# Patient Record
Sex: FEMALE | Race: WHITE | Employment: FULL TIME | URBAN - METROPOLITAN AREA
[De-identification: names, ages, dates, MRNs, and addresses within clinical notes are randomized per-mention and may not be internally consistent; named-entity substitution may affect disease eponyms.]

---

## 2017-03-13 ENCOUNTER — HOSPITAL ENCOUNTER (INPATIENT)
Facility: HOSPITAL | Age: 57
LOS: 3 days | Discharge: HOME | DRG: 189 | End: 2017-03-16
Attending: EMERGENCY MEDICINE | Admitting: HOSPITALIST

## 2017-03-13 ENCOUNTER — OFFICE VISIT (OUTPATIENT)
Dept: FAMILY MEDICINE CLINIC | Facility: CLINIC | Age: 57
End: 2017-03-13

## 2017-03-13 ENCOUNTER — APPOINTMENT (OUTPATIENT)
Dept: GENERAL RADIOLOGY | Facility: HOSPITAL | Age: 57
DRG: 189 | End: 2017-03-13
Attending: EMERGENCY MEDICINE

## 2017-03-13 VITALS — OXYGEN SATURATION: 80 % | HEART RATE: 106 BPM | RESPIRATION RATE: 26 BRPM

## 2017-03-13 DIAGNOSIS — Z02.9 ADMINISTRATIVE ENCOUNTER: Primary | ICD-10-CM

## 2017-03-13 DIAGNOSIS — J20.9 ACUTE BRONCHITIS, UNSPECIFIED ORGANISM: Primary | ICD-10-CM

## 2017-03-13 DIAGNOSIS — J44.1 COPD EXACERBATION (HCC): ICD-10-CM

## 2017-03-13 LAB
ANION GAP SERPL CALC-SCNC: 10 MMOL/L (ref 0–18)
BASOPHILS # BLD: 0 K/UL (ref 0–0.2)
BASOPHILS NFR BLD: 0 %
BNP SERPL-MCNC: 44 PG/ML (ref 0–100)
BUN SERPL-MCNC: 9 MG/DL (ref 8–20)
BUN/CREAT SERPL: 12 (ref 10–20)
CALCIUM SERPL-MCNC: 8.4 MG/DL (ref 8.5–10.5)
CHLORIDE SERPL-SCNC: 92 MMOL/L (ref 95–110)
CO2 SERPL-SCNC: 34 MMOL/L (ref 22–32)
CREAT SERPL-MCNC: 0.75 MG/DL (ref 0.5–1.5)
D DIMER PPP FEU-MCNC: 0.52 MCG/ML (ref ?–0.56)
EOSINOPHIL # BLD: 0 K/UL (ref 0–0.7)
EOSINOPHIL NFR BLD: 0 %
ERYTHROCYTE [DISTWIDTH] IN BLOOD BY AUTOMATED COUNT: 14.5 % (ref 11–15)
FLUAV + FLUBV RNA SPEC NAA+PROBE: NEGATIVE
FLUAV + FLUBV RNA SPEC NAA+PROBE: NEGATIVE
FLUAV + FLUBV RNA SPEC NAA+PROBE: POSITIVE
GLUCOSE SERPL-MCNC: 101 MG/DL (ref 70–99)
HCT VFR BLD AUTO: 38.7 % (ref 35–48)
HGB BLD-MCNC: 12.8 G/DL (ref 12–16)
LYMPHOCYTES # BLD: 0.7 K/UL (ref 1–4)
LYMPHOCYTES NFR BLD: 10 %
MCH RBC QN AUTO: 27.9 PG (ref 27–32)
MCHC RBC AUTO-ENTMCNC: 32.9 G/DL (ref 32–37)
MCV RBC AUTO: 84.8 FL (ref 80–100)
MONOCYTES # BLD: 0.9 K/UL (ref 0–1)
MONOCYTES NFR BLD: 13 %
NEUTROPHILS # BLD AUTO: 5.3 K/UL (ref 1.8–7.7)
NEUTROPHILS NFR BLD: 77 %
OSMOLALITY UR CALC.SUM OF ELEC: 281 MOSM/KG (ref 275–295)
PLATELET # BLD AUTO: 183 K/UL (ref 140–400)
PMV BLD AUTO: 10.2 FL (ref 7.4–10.3)
POTASSIUM SERPL-SCNC: 3.7 MMOL/L (ref 3.3–5.1)
RBC # BLD AUTO: 4.57 M/UL (ref 3.7–5.4)
SODIUM SERPL-SCNC: 136 MMOL/L (ref 136–144)
TROPONIN I SERPL-MCNC: 0.01 NG/ML (ref ?–0.03)
WBC # BLD AUTO: 6.9 K/UL (ref 4–11)

## 2017-03-13 PROCEDURE — 71020 XR CHEST PA + LAT CHEST (CPT=71020): CPT

## 2017-03-13 RX ORDER — KETOROLAC TROMETHAMINE 30 MG/ML
30 INJECTION, SOLUTION INTRAMUSCULAR; INTRAVENOUS ONCE
Status: COMPLETED | OUTPATIENT
Start: 2017-03-13 | End: 2017-03-13

## 2017-03-13 RX ORDER — IPRATROPIUM BROMIDE AND ALBUTEROL SULFATE 2.5; .5 MG/3ML; MG/3ML
3 SOLUTION RESPIRATORY (INHALATION) ONCE
Status: COMPLETED | OUTPATIENT
Start: 2017-03-13 | End: 2017-03-13

## 2017-03-13 RX ORDER — OMEPRAZOLE 20 MG/1
20 CAPSULE, DELAYED RELEASE ORAL EVERY MORNING
COMMUNITY
Start: 2017-03-14

## 2017-03-13 RX ORDER — ACETAMINOPHEN 325 MG/1
650 TABLET ORAL EVERY 6 HOURS PRN
Status: DISCONTINUED | OUTPATIENT
Start: 2017-03-13 | End: 2017-03-16

## 2017-03-13 RX ORDER — CETIRIZINE HYDROCHLORIDE 10 MG/1
10 TABLET ORAL DAILY
Status: DISCONTINUED | OUTPATIENT
Start: 2017-03-14 | End: 2017-03-16

## 2017-03-13 RX ORDER — PANTOPRAZOLE SODIUM 20 MG/1
20 TABLET, DELAYED RELEASE ORAL
Status: DISCONTINUED | OUTPATIENT
Start: 2017-03-14 | End: 2017-03-16

## 2017-03-13 RX ORDER — PREDNISONE 20 MG/1
TABLET ORAL
Qty: 12 TABLET | Refills: 0 | Status: SHIPPED | OUTPATIENT
Start: 2017-03-13 | End: 2017-03-16

## 2017-03-13 RX ORDER — FUROSEMIDE 40 MG/1
40 TABLET ORAL DAILY
COMMUNITY
Start: 2017-03-14

## 2017-03-13 RX ORDER — ENALAPRIL MALEATE 2.5 MG/1
2.5 TABLET ORAL 2 TIMES DAILY
Status: DISCONTINUED | OUTPATIENT
Start: 2017-03-13 | End: 2017-03-16

## 2017-03-13 RX ORDER — ALBUTEROL SULFATE 2.5 MG/3ML
2.5 SOLUTION RESPIRATORY (INHALATION) ONCE
Status: COMPLETED | OUTPATIENT
Start: 2017-03-13 | End: 2017-03-13

## 2017-03-13 RX ORDER — CARVEDILOL 6.25 MG/1
6.25 TABLET ORAL 2 TIMES DAILY WITH MEALS
Status: DISCONTINUED | OUTPATIENT
Start: 2017-03-13 | End: 2017-03-16

## 2017-03-13 RX ORDER — ONDANSETRON 2 MG/ML
4 INJECTION INTRAMUSCULAR; INTRAVENOUS EVERY 6 HOURS PRN
Status: DISCONTINUED | OUTPATIENT
Start: 2017-03-13 | End: 2017-03-16

## 2017-03-13 RX ORDER — ENOXAPARIN SODIUM 100 MG/ML
40 INJECTION SUBCUTANEOUS NIGHTLY
Status: DISCONTINUED | OUTPATIENT
Start: 2017-03-13 | End: 2017-03-16

## 2017-03-13 RX ORDER — POTASSIUM CHLORIDE 20 MEQ/1
20 TABLET, EXTENDED RELEASE ORAL DAILY
Status: DISCONTINUED | OUTPATIENT
Start: 2017-03-14 | End: 2017-03-16

## 2017-03-13 RX ORDER — POTASSIUM CHLORIDE 750 MG/1
20 TABLET, EXTENDED RELEASE ORAL DAILY
COMMUNITY
Start: 2017-03-14

## 2017-03-13 RX ORDER — FUROSEMIDE 40 MG/1
40 TABLET ORAL DAILY
Status: DISCONTINUED | OUTPATIENT
Start: 2017-03-14 | End: 2017-03-16

## 2017-03-13 RX ORDER — ENALAPRIL MALEATE 2.5 MG/1
2.5 TABLET ORAL 2 TIMES DAILY
COMMUNITY
Start: 2017-03-13

## 2017-03-13 RX ORDER — AZITHROMYCIN 250 MG/1
TABLET, FILM COATED ORAL
Qty: 1 PACKAGE | Refills: 0 | Status: SHIPPED | OUTPATIENT
Start: 2017-03-13 | End: 2017-03-18

## 2017-03-13 RX ORDER — AZITHROMYCIN 250 MG/1
250 TABLET, FILM COATED ORAL EVERY 24 HOURS
Status: DISCONTINUED | OUTPATIENT
Start: 2017-03-14 | End: 2017-03-13

## 2017-03-13 RX ORDER — LORATADINE 10 MG/1
10 TABLET ORAL DAILY
COMMUNITY
Start: 2017-03-14

## 2017-03-13 RX ORDER — ASPIRIN 81 MG/1
162 TABLET ORAL EVERY 6 HOURS PRN
Status: DISCONTINUED | OUTPATIENT
Start: 2017-03-13 | End: 2017-03-16

## 2017-03-13 RX ORDER — IPRATROPIUM BROMIDE AND ALBUTEROL SULFATE 2.5; .5 MG/3ML; MG/3ML
3 SOLUTION RESPIRATORY (INHALATION) EVERY 4 HOURS PRN
Status: DISCONTINUED | OUTPATIENT
Start: 2017-03-13 | End: 2017-03-14

## 2017-03-13 RX ORDER — SODIUM PHOSPHATE, DIBASIC AND SODIUM PHOSPHATE, MONOBASIC 7; 19 G/133ML; G/133ML
1 ENEMA RECTAL ONCE AS NEEDED
Status: ACTIVE | OUTPATIENT
Start: 2017-03-13 | End: 2017-03-13

## 2017-03-13 RX ORDER — METHYLPREDNISOLONE SODIUM SUCCINATE 40 MG/ML
40 INJECTION, POWDER, LYOPHILIZED, FOR SOLUTION INTRAMUSCULAR; INTRAVENOUS EVERY 6 HOURS
Status: DISCONTINUED | OUTPATIENT
Start: 2017-03-13 | End: 2017-03-16

## 2017-03-13 RX ORDER — GUAIFENESIN 100 MG/5ML
200 SOLUTION ORAL EVERY 4 HOURS PRN
Status: DISCONTINUED | OUTPATIENT
Start: 2017-03-13 | End: 2017-03-16

## 2017-03-13 RX ORDER — DOCUSATE SODIUM 100 MG/1
100 CAPSULE, LIQUID FILLED ORAL 2 TIMES DAILY
Status: DISCONTINUED | OUTPATIENT
Start: 2017-03-13 | End: 2017-03-16

## 2017-03-13 RX ORDER — PREDNISONE 20 MG/1
60 TABLET ORAL ONCE
Status: COMPLETED | OUTPATIENT
Start: 2017-03-13 | End: 2017-03-13

## 2017-03-13 RX ORDER — DIPHENHYDRAMINE HCL 25 MG
25 CAPSULE ORAL EVERY 6 HOURS PRN
Status: DISCONTINUED | OUTPATIENT
Start: 2017-03-13 | End: 2017-03-16

## 2017-03-13 RX ORDER — BISACODYL 10 MG
10 SUPPOSITORY, RECTAL RECTAL
Status: DISCONTINUED | OUTPATIENT
Start: 2017-03-13 | End: 2017-03-16

## 2017-03-13 RX ORDER — CARVEDILOL 6.25 MG/1
6.25 TABLET ORAL 2 TIMES DAILY WITH MEALS
COMMUNITY
Start: 2017-03-13

## 2017-03-13 RX ORDER — OSELTAMIVIR PHOSPHATE 75 MG/1
75 CAPSULE ORAL 2 TIMES DAILY
Status: DISCONTINUED | OUTPATIENT
Start: 2017-03-13 | End: 2017-03-16

## 2017-03-13 RX ORDER — AZITHROMYCIN 250 MG/1
500 TABLET, FILM COATED ORAL ONCE
Status: COMPLETED | OUTPATIENT
Start: 2017-03-13 | End: 2017-03-13

## 2017-03-13 RX ORDER — POLYETHYLENE GLYCOL 3350 17 G/17G
17 POWDER, FOR SOLUTION ORAL DAILY PRN
Status: DISCONTINUED | OUTPATIENT
Start: 2017-03-13 | End: 2017-03-16

## 2017-03-13 NOTE — ED NOTES
Pt presents to ED via steady gait with c/o left-sided \"aching\" constant chest pain since x 2 days- states sitting down when it began. Pt denies any other complaints.  Pt went to immediate care prior to ED arrival and had EKG done- sent to ED for further e

## 2017-03-13 NOTE — H&P
[facility]    PATIENT'S NAME: Alesha Courtney PHYSICIAN: Stefania Guadarrama MD   PATIENT ACCOUNT#:   [id number]    LOCATION:  [place] [address]  MEDICAL RECORD #:   [id number]       YOB: 1960  ADMISSION DATE:       03/13/ and soft palate. NECK:  Trachea midline. Full range of motion. Supple. No thyromegaly or lymphadenopathy. LUNGS:  Bilateral expiratory wheezes with moderate air restriction. HEART:  Regular rate and rhythm. S1 and S2 auscultated. No murmur.   ABDOME

## 2017-03-13 NOTE — ED NOTES
Pt presents to ED via steady gait with c/o SOB x 2 days- pt states in [place] this past weekend and felt SOB Saturday evening. Pt with slightly labored breath sounds with diminished sounds throughout lungs fields with wheezing. Pt states hx COPD and CHF.  Selene Davalos

## 2017-03-13 NOTE — ED PROVIDER NOTES
Patient Seen in: [facility] AND [facility] Emergency Department    History   Patient presents with:  Dyspnea THOMAS SOB (respiratory)    Stated Complaint: Low SPO2    HPI    60-year-old female with history of cardiomyopathy, congestive heart failure, COPD, and stat Exam    General Appearance: alert, no distress  Eyes: pupils equal and round no pallor or injection  ENT, Mouth: mucous membranes moist.  Bilateral TMs normal-appearing. Oropharynx normal-appearing.   Respiratory: there are no retractions, diffuse wheezes DRAW LAVENDER   RAINBOW DRAW LIGHT GREEN   RAINBOW DRAW DARK GREEN   RAINBOW DRAW LAVENDER TALL (BNP)   INFLUENZA A/B AND RSV PCR      EKG    Rate, intervals and axes as noted on EKG Report.   Rate: 89  Rhythm: Sinus Rhythm  Axis: Normal  Reading: Possible

## 2017-03-13 NOTE — PROGRESS NOTES
Pt presented with weakness, dyspnea and cough   Accompanied by:   Brief H&P:   58y F w/ self reported hx of CHF, on Lasix, c/o of 1 wk of worsening weakness, cough and shortness of breath. She is awake, alert, oriented and speaking clearly.  No acute

## 2017-03-14 LAB
ANION GAP SERPL CALC-SCNC: 8 MMOL/L (ref 0–18)
BASOPHILS # BLD: 0 K/UL (ref 0–0.2)
BASOPHILS NFR BLD: 0 %
BUN SERPL-MCNC: 8 MG/DL (ref 8–20)
BUN/CREAT SERPL: 11.6 (ref 10–20)
CALCIUM SERPL-MCNC: 8.7 MG/DL (ref 8.5–10.5)
CHLORIDE SERPL-SCNC: 93 MMOL/L (ref 95–110)
CO2 SERPL-SCNC: 37 MMOL/L (ref 22–32)
CREAT SERPL-MCNC: 0.69 MG/DL (ref 0.5–1.5)
EOSINOPHIL # BLD: 0 K/UL (ref 0–0.7)
EOSINOPHIL NFR BLD: 0 %
ERYTHROCYTE [DISTWIDTH] IN BLOOD BY AUTOMATED COUNT: 14.8 % (ref 11–15)
GLUCOSE SERPL-MCNC: 148 MG/DL (ref 70–99)
HCT VFR BLD AUTO: 40.2 % (ref 35–48)
HGB BLD-MCNC: 12.9 G/DL (ref 12–16)
LYMPHOCYTES # BLD: 0.6 K/UL (ref 1–4)
LYMPHOCYTES NFR BLD: 9 %
MCH RBC QN AUTO: 27.6 PG (ref 27–32)
MCHC RBC AUTO-ENTMCNC: 32.1 G/DL (ref 32–37)
MCV RBC AUTO: 86.2 FL (ref 80–100)
MONOCYTES # BLD: 0.2 K/UL (ref 0–1)
MONOCYTES NFR BLD: 3 %
NEUTROPHILS # BLD AUTO: 5.4 K/UL (ref 1.8–7.7)
NEUTROPHILS NFR BLD: 88 %
OSMOLALITY UR CALC.SUM OF ELEC: 287 MOSM/KG (ref 275–295)
PLATELET # BLD AUTO: 191 K/UL (ref 140–400)
PMV BLD AUTO: 10.3 FL (ref 7.4–10.3)
POTASSIUM SERPL-SCNC: 4.3 MMOL/L (ref 3.3–5.1)
RBC # BLD AUTO: 4.66 M/UL (ref 3.7–5.4)
SODIUM SERPL-SCNC: 138 MMOL/L (ref 136–144)
WBC # BLD AUTO: 6.1 K/UL (ref 4–11)

## 2017-03-14 PROCEDURE — 99233 SBSQ HOSP IP/OBS HIGH 50: CPT | Performed by: HOSPITALIST

## 2017-03-14 RX ORDER — LEVOFLOXACIN 750 MG/1
750 TABLET ORAL DAILY
Status: DISCONTINUED | OUTPATIENT
Start: 2017-03-14 | End: 2017-03-16

## 2017-03-14 RX ORDER — 0.9 % SODIUM CHLORIDE 0.9 %
VIAL (ML) INJECTION
Status: COMPLETED
Start: 2017-03-14 | End: 2017-03-14

## 2017-03-14 RX ORDER — IPRATROPIUM BROMIDE AND ALBUTEROL SULFATE 2.5; .5 MG/3ML; MG/3ML
3 SOLUTION RESPIRATORY (INHALATION)
Status: DISCONTINUED | OUTPATIENT
Start: 2017-03-14 | End: 2017-03-16

## 2017-03-14 NOTE — PROGRESS NOTES
[facility]    Progress Note    Armando Cruz Patient Status:  Inpatient    1960 MRN [id number]   Location [facility] 5SW/SE Attending Meryle Guarneri, MD   Hosp Day # 1 PCP [facility]       Subjective:   Emotional, regarding all the above  Greater than 35 minutes spent, >50% spent counseling and coordinating of care as outlined above.        Results:     Lab Results  Component Value Date   WBC 6.1 03/14/2017   HGB 12.9 03/14/2017   HCT 40.2 03/14/2017    03/14/

## 2017-03-14 NOTE — DISCHARGE PLANNING
LINDA received for dc planning, evaluation of home health. SW met with the pt who states she lives with her . Pt is not homebound and would not qualify for home health services. She also intends to return to work at [employer].      Pt states she already met w

## 2017-03-14 NOTE — PAYOR COMM NOTE
NO PAYOR NOTED AT THIS TIME    HISTORY AND PHYSICAL EXAMINATION    REASON FOR ADMISSION:  Acute COPD exacerbation.     HISTORY OF PRESENT ILLNESS:  The patient is a 30-year-old  female who presented to the emergency department with progressive dysp auscultated.  No murmur. ABDOMEN:  Soft, nontender, obese.     EXTREMITIES:  Edema +2 both legs. NEUROLOGIC:  Motor and sensory intact.  Cranial nerves II-XII are intact.     ASSESSMENT AND PLAN:     1.      Acute chronic obstructive pulmonary disease exa

## 2017-03-14 NOTE — PLAN OF CARE
Problem: Patient/Family Goals  Goal: Patient/Family Long Term Goal  Patient’s Long Term Goal: To go home without shortness of breath and off of oxygen    Interventions:  - Educate patient on follow up with MD  - Instruct to take meds as prescribed.   - Educ for physical needs  - Identify cognitive and physical deficits and behaviors that affect risk of falls.   - O'Brien fall precautions as indicated by assessment.  - Educate pt/family on patient safety including physical limitations  - Instruct pt to call f

## 2017-03-15 PROCEDURE — 99232 SBSQ HOSP IP/OBS MODERATE 35: CPT | Performed by: HOSPITALIST

## 2017-03-15 RX ORDER — 0.9 % SODIUM CHLORIDE 0.9 %
VIAL (ML) INJECTION
Status: COMPLETED
Start: 2017-03-15 | End: 2017-03-15

## 2017-03-15 NOTE — CM/SW NOTE
CALLI met with the patient at bedside regarding Home Nebulizer. Patient states she has a very old model at home; 7 years and is considering purchasing a new one if recommended by MD's.   Informed patient that she can purchase one from [facility] for approx

## 2017-03-15 NOTE — PLAN OF CARE
Phone call was received from microbiology department reported that sputum culture obtain contained a lot of epithelithial cells. A new sputum culture needs to obtained.  Notify Dr. Liya Montalvo who reported that sputum culture does not need to be obtained and to ca

## 2017-03-15 NOTE — PROGRESS NOTES
[facility]    Progress Note    Spiro Nyhabarry Patient Status:  Inpatient    1960 MRN [id number]   Location [facility] 5SW/SE Attending Sharon Fuentes MD   Hosp Day # 2 PCP [facility]       Subjective:   Feeling muc

## 2017-03-15 NOTE — PLAN OF CARE
Problem: Patient/Family Goals  Goal: Patient/Family Long Term Goal  Patient’s Long Term Goal: To go home without shortness of breath and off of oxygen    Interventions:  - Educate patient on follow up with MD  - Instruct to take meds as prescribed.   - Educ cracked skin on her palms and feet. Both Vaseline and lotion were given to the pt.      Problem: SAFETY ADULT - FALL  Goal: Free from fall injury  INTERVENTIONS:  - Assess pt frequently for physical needs  - Identify cognitive and physical deficits and beha

## 2017-03-15 NOTE — PLAN OF CARE
Problem: Patient/Family Goals  Goal: Patient/Family Long Term Goal  Patient’s Long Term Goal: To go home without shortness of breath and off of oxygen    Interventions:  - Educate patient on follow up with MD  - Instruct to take meds as prescribed.   - Educ instructed to notify day shift hospitalist     Problem: SAFETY ADULT - FALL  Goal: Free from fall injury  INTERVENTIONS:  - Assess pt frequently for physical needs  - Identify cognitive and physical deficits and behaviors that affect risk of falls.   - Inst

## 2017-03-15 NOTE — PLAN OF CARE
Patient Centered Care    • Patient preferences are identified and integrated in the patient's plan of care Progressing    Patient is self care and independent and requires minimal assistance.     Patient/Family Goals    • Patient/Family Long Term Goal  Long

## 2017-03-16 VITALS
SYSTOLIC BLOOD PRESSURE: 110 MMHG | DIASTOLIC BLOOD PRESSURE: 69 MMHG | HEIGHT: 64 IN | HEART RATE: 95 BPM | WEIGHT: 211.44 LBS | TEMPERATURE: 99 F | OXYGEN SATURATION: 96 % | BODY MASS INDEX: 36.1 KG/M2 | RESPIRATION RATE: 18 BRPM

## 2017-03-16 PROCEDURE — 99239 HOSP IP/OBS DSCHRG MGMT >30: CPT | Performed by: HOSPITALIST

## 2017-03-16 RX ORDER — 0.9 % SODIUM CHLORIDE 0.9 %
VIAL (ML) INJECTION
Status: COMPLETED
Start: 2017-03-16 | End: 2017-03-16

## 2017-03-16 RX ORDER — OSELTAMIVIR PHOSPHATE 75 MG/1
75 CAPSULE ORAL 2 TIMES DAILY
Qty: 4 CAPSULE | Refills: 0 | Status: SHIPPED | OUTPATIENT
Start: 2017-03-16 | End: 2019-03-31

## 2017-03-16 RX ORDER — PREDNISONE 20 MG/1
TABLET ORAL
Qty: 12 TABLET | Refills: 1 | Status: SHIPPED | OUTPATIENT
Start: 2017-03-16 | End: 2019-03-31

## 2017-03-16 RX ORDER — ALBUTEROL SULFATE 90 UG/1
1 AEROSOL, METERED RESPIRATORY (INHALATION) EVERY 4 HOURS PRN
Qty: 1 INHALER | Refills: 0 | Status: SHIPPED | OUTPATIENT
Start: 2017-03-16

## 2017-03-16 NOTE — DISCHARGE PLANNING
10:58am Update- RN states the pt tested at 96% and does not qualify for home O2. SW cancelled referral to HME. CTL will f/u with the pt about the self pay nebulizer.   --  9:48am-YOU spoke with RN who states the pt had been desaturating and will require home

## 2017-03-16 NOTE — PLAN OF CARE
Patient Centered Care    • Patient preferences are identified and integrated in the patient's plan of care Progressing    Patient is independent and self care.   Patient is encouraged to cough and deep breathe and ambulate to promote mobility of bilateral u

## 2017-03-16 NOTE — DISCHARGE PLANNING
Discharge instructions and prescriptions given to patient. Patient was instructed to follow up with primary care physican for appintment. Patient was instructed to follow up in the COPD for appointment as scheduled.  Patient was given educational handouts o

## 2017-03-17 ENCOUNTER — TELEPHONE (OUTPATIENT)
Dept: CARDIOLOGY UNIT | Facility: HOSPITAL | Age: 57
End: 2017-03-17

## 2017-03-17 NOTE — DISCHARGE SUMMARY
[facility] HOSPITALIST  DISCHARGE SUMMARY     Gene Regan Patient Status:  Inpatient    1960 MRN [id number]   Location [facility] 5SW/SE Attending No att. providers found   Hosp Day # 3 PCP Tali Jovel 38.: 3/13/2017 Prescription details    Albuterol Sulfate  (90 Base) MCG/ACT Aers   Commonly known as:  PROVENTIL HFA        Inhale 1 puff into the lungs every 4 (four) hours as needed for Wheezing.     Quantity:  1 Inhaler   Refills:  0       azithromycin 250 MG Ta Refills:  0            Where to Get Your Medications      Please  your prescriptions at the location directed by your doctor or nurse     Bring a paper prescription for each of these medications    - Albuterol Sulfate  (90 Base) MCG/ACT Ae

## 2019-03-31 ENCOUNTER — OFFICE VISIT (OUTPATIENT)
Dept: FAMILY MEDICINE CLINIC | Facility: CLINIC | Age: 59
End: 2019-03-31

## 2019-03-31 VITALS
OXYGEN SATURATION: 93 % | HEART RATE: 80 BPM | HEIGHT: 65.5 IN | SYSTOLIC BLOOD PRESSURE: 140 MMHG | RESPIRATION RATE: 18 BRPM | BODY MASS INDEX: 33.42 KG/M2 | DIASTOLIC BLOOD PRESSURE: 93 MMHG | TEMPERATURE: 98 F | WEIGHT: 203 LBS

## 2019-03-31 DIAGNOSIS — R09.89 ABNORMAL CHEST SOUNDS: Primary | ICD-10-CM

## 2019-03-31 PROCEDURE — 99213 OFFICE O/P EST LOW 20 MIN: CPT | Performed by: NURSE PRACTITIONER

## 2019-03-31 RX ORDER — DOXYCYCLINE HYCLATE 100 MG/1
100 CAPSULE ORAL 2 TIMES DAILY
Qty: 14 CAPSULE | Refills: 0 | Status: SHIPPED | OUTPATIENT
Start: 2019-03-31 | End: 2019-04-07

## 2019-03-31 RX ORDER — PREDNISONE 20 MG/1
40 TABLET ORAL DAILY
Qty: 10 TABLET | Refills: 0 | Status: SHIPPED | OUTPATIENT
Start: 2019-03-31 | End: 2019-04-05

## 2019-03-31 NOTE — PATIENT INSTRUCTIONS
Pneumonia (Adult)  Pneumonia is an infection deep within the lungs. It is in the small air sacs (alveoli). Pneumonia may be caused by a virus or bacteria. Pneumonia caused by bacteria is usually treated with an antibiotic.  Severe cases may need to be ariana If you are 72 or older, you should get a pneumococcal vaccine and a yearly flu (influenza) shot. You should also get these vaccines if you have chronic lung disease like asthma, emphysema, or COPD.  Recently, a second type of pneumonia vaccine has become av · More mucus, thicker mucus or mucus of a different color  · Tiredness, decreased energy, or trouble doing your usual activities  · Fever  · Chest tightness  · Your symptoms don’t get better even when you use your usual medicines, inhalers, and nebulizer · Try to avoid things that usually set you off, like dust, chemical fumes, hairsprays, or strong perfumes. Follow-up care  Follow up with your healthcare provider, or as advised.   If a culture was done, you will be told if your treatment needs to be clay

## 2019-03-31 NOTE — PROGRESS NOTES
CHIEF COMPLAINT:   Patient presents with:  Cough  Sinus Problem        HPI:   Gary Shepard is a 63year old female who presents for cough for 1 week. Cough is productive with yellow sputum.  Associated symptoms include left lower back pain, nasal conge EYES: Denies blurred vision or double vision. HENT: Denies ear pain or decreased hearing. See HPI  CARDIOVASCULAR: Denies chest pain or palpitations  LUNGS: Per HPI. GI: Denies N/V/C/D or abdominal pain. PSYCH: Cooperative. Tearful.  Patient reports s Sig: Take 1 capsule (100 mg total) by mouth 2 (two) times daily for 7 days. • predniSONE 20 MG Oral Tab 10 tablet 0     Sig: Take 2 tablets (40 mg total) by mouth daily for 5 days.          Patient Instructions     Pneumonia (Adult)  Pneumonia is an inf Follow up with your healthcare provider in the next 2 to 3 days, or as advised. This is to be sure the medicine is helping you get better. If you are 72 or older, you should get a pneumococcal vaccine and a yearly flu (influenza) shot.  You should also get · Shortness of breath, shallow or rapid breathing, or wheezing that gets worse  · Lung infection  · Cough that gets worse  · More mucus, thicker mucus or mucus of a different color  · Tiredness, decreased energy, or trouble doing your usual activities  · F · If there is a weather advisory warning to stay indoors, try to stay inside when possible. · Try to eat healthy and get plenty of sleep. · Try to avoid things that usually set you off, like dust, chemical fumes, hairsprays, or strong perfumes.   Follow-u

## 2019-12-13 DIAGNOSIS — R09.89 ABNORMAL CHEST SOUNDS: ICD-10-CM

## 2019-12-16 RX ORDER — PREDNISONE 20 MG/1
TABLET ORAL
Qty: 10 TABLET | Refills: 0 | OUTPATIENT
Start: 2019-12-16

## 2021-02-02 NOTE — ED INITIAL ASSESSMENT (HPI)
Patient sent from [facility] urgent care for hypoxia---patient with cough, sinus congestion for a few weeks.  Patient with recent [facility], has been sick since    C/o dyspnea on exertion, c/o headache    82% on room air, 6 L applied
No

## 2024-03-17 ENCOUNTER — APPOINTMENT (OUTPATIENT)
Dept: CT IMAGING | Facility: HOSPITAL | Age: 64
End: 2024-03-17
Attending: EMERGENCY MEDICINE
Payer: MEDICAID

## 2024-03-17 ENCOUNTER — APPOINTMENT (OUTPATIENT)
Dept: CT IMAGING | Facility: HOSPITAL | Age: 64
End: 2024-03-17
Attending: STUDENT IN AN ORGANIZED HEALTH CARE EDUCATION/TRAINING PROGRAM
Payer: MEDICAID

## 2024-03-17 ENCOUNTER — HOSPITAL ENCOUNTER (INPATIENT)
Facility: HOSPITAL | Age: 64
LOS: 7 days | Discharge: HOME | End: 2024-03-24
Attending: EMERGENCY MEDICINE | Admitting: INTERNAL MEDICINE
Payer: MEDICAID

## 2024-03-17 ENCOUNTER — APPOINTMENT (OUTPATIENT)
Dept: GENERAL RADIOLOGY | Facility: HOSPITAL | Age: 64
End: 2024-03-17
Attending: EMERGENCY MEDICINE
Payer: MEDICAID

## 2024-03-17 DIAGNOSIS — J11.1 INFLUENZA: ICD-10-CM

## 2024-03-17 DIAGNOSIS — R47.1 DYSARTHRIA: Primary | ICD-10-CM

## 2024-03-17 DIAGNOSIS — R06.89 HYPERCAPNIA: ICD-10-CM

## 2024-03-17 DIAGNOSIS — R91.8 ABNORMAL CT LUNG SCREENING: ICD-10-CM

## 2024-03-17 DIAGNOSIS — R09.02 HYPOXIA: ICD-10-CM

## 2024-03-17 LAB
ALBUMIN SERPL-MCNC: 3.9 G/DL (ref 3.2–4.8)
ALBUMIN/GLOB SERPL: 1.4 {RATIO} (ref 1–2)
ALP LIVER SERPL-CCNC: 41 U/L
ALT SERPL-CCNC: 14 U/L
AMPHET UR QL SCN: NEGATIVE
AST SERPL-CCNC: 38 U/L (ref ?–34)
BARBITURATES UR QL SCN: NEGATIVE
BASE EXCESS BLD CALC-SCNC: 24.7 MMOL/L (ref ?–2)
BASOPHILS # BLD AUTO: 0.03 X10(3) UL (ref 0–0.2)
BASOPHILS NFR BLD AUTO: 0.3 %
BENZODIAZ UR QL SCN: NEGATIVE
BILIRUB SERPL-MCNC: <0.2 MG/DL (ref 0.2–1.1)
BILIRUB UR QL: NEGATIVE
BNP SERPL-MCNC: 252 PG/ML
BUN BLD-MCNC: 25 MG/DL (ref 9–23)
BUN/CREAT SERPL: 28.1 (ref 10–20)
CALCIUM BLD-MCNC: 8.3 MG/DL (ref 8.7–10.4)
CHLORIDE SERPL-SCNC: 90 MMOL/L (ref 98–112)
CLARITY UR: CLEAR
CO2 SERPL-SCNC: >40 MMOL/L (ref 21–32)
COCAINE UR QL: NEGATIVE
CREAT BLD-MCNC: 0.89 MG/DL
CREAT UR-SCNC: 53.2 MG/DL
DEPRECATED RDW RBC AUTO: 43.6 FL (ref 35.1–46.3)
EGFRCR SERPLBLD CKD-EPI 2021: 73 ML/MIN/1.73M2 (ref 60–?)
EOSINOPHIL # BLD AUTO: 0 X10(3) UL (ref 0–0.7)
EOSINOPHIL NFR BLD AUTO: 0 %
ERYTHROCYTE [DISTWIDTH] IN BLOOD BY AUTOMATED COUNT: 13 % (ref 11–15)
EST. AVERAGE GLUCOSE BLD GHB EST-MCNC: 143 MG/DL (ref 68–126)
FLUAV + FLUBV RNA SPEC NAA+PROBE: NEGATIVE
FLUAV + FLUBV RNA SPEC NAA+PROBE: POSITIVE
GLOBULIN PLAS-MCNC: 2.7 G/DL (ref 2.8–4.4)
GLUCOSE BLD-MCNC: 129 MG/DL (ref 70–99)
GLUCOSE BLDC GLUCOMTR-MCNC: 125 MG/DL (ref 70–99)
GLUCOSE UR-MCNC: NORMAL MG/DL
HBA1C MFR BLD: 6.6 % (ref ?–5.7)
HCO3 BLDA-SCNC: 43.8 MEQ/L (ref 21–27)
HCT VFR BLD AUTO: 44.6 %
HGB BLD-MCNC: 14.1 G/DL
HYALINE CASTS #/AREA URNS AUTO: PRESENT /LPF
IMM GRANULOCYTES # BLD AUTO: 0.06 X10(3) UL (ref 0–1)
IMM GRANULOCYTES NFR BLD: 0.6 %
LEUKOCYTE ESTERASE UR QL STRIP.AUTO: NEGATIVE
LYMPHOCYTES # BLD AUTO: 0.86 X10(3) UL (ref 1–4)
LYMPHOCYTES NFR BLD AUTO: 9 %
MCH RBC QN AUTO: 29 PG (ref 26–34)
MCHC RBC AUTO-ENTMCNC: 31.6 G/DL (ref 31–37)
MCV RBC AUTO: 91.6 FL
MDMA UR QL SCN: NEGATIVE
METHADONE UR QL SCN: NEGATIVE
MODIFIED ALLEN TEST: POSITIVE
MONOCYTES # BLD AUTO: 1.29 X10(3) UL (ref 0.1–1)
MONOCYTES NFR BLD AUTO: 13.5 %
NEUTROPHILS # BLD AUTO: 7.33 X10 (3) UL (ref 1.5–7.7)
NEUTROPHILS # BLD AUTO: 7.33 X10(3) UL (ref 1.5–7.7)
NEUTROPHILS NFR BLD AUTO: 76.6 %
NITRITE UR QL STRIP.AUTO: NEGATIVE
O2 CT BLD-SCNC: 17.1 VOL% (ref 15–23)
OPIATES UR QL SCN: NEGATIVE
OSMOLALITY SERPL CALC.SUM OF ELEC: 286 MOSM/KG (ref 275–295)
OXYCODONE UR QL SCN: NEGATIVE
OXYGEN LITERS/MINUTE: 4 L/MIN
PCO2 BLDA: 95 MM HG (ref 35–45)
PCP UR QL SCN: NEGATIVE
PH BLDA: 7.38 [PH] (ref 7.35–7.45)
PH UR: 5.5 [PH] (ref 5–8)
PLATELET # BLD AUTO: 236 10(3)UL (ref 150–450)
PO2 BLDA: 59 MM HG (ref 80–100)
POTASSIUM SERPL-SCNC: 3.3 MMOL/L (ref 3.5–5.1)
PROCALCITONIN SERPL-MCNC: 0.09 NG/ML (ref ?–0.05)
PROT SERPL-MCNC: 6.6 G/DL (ref 5.7–8.2)
PROT UR-MCNC: 50 MG/DL
PUNCTURE CHARGE: YES
RBC # BLD AUTO: 4.87 X10(6)UL
RSV RNA SPEC NAA+PROBE: NEGATIVE
SAO2 % BLDA: 87.9 % (ref 94–100)
SARS-COV-2 RNA RESP QL NAA+PROBE: NOT DETECTED
SODIUM SERPL-SCNC: 135 MMOL/L (ref 136–145)
SP GR UR STRIP: 1.02 (ref 1–1.03)
TROPONIN I SERPL HS-MCNC: 21 NG/L
TSI SER-ACNC: 1.05 MIU/ML (ref 0.55–4.78)
UROBILINOGEN UR STRIP-ACNC: NORMAL
VIT B12 SERPL-MCNC: 801 PG/ML (ref 211–911)
WBC # BLD AUTO: 9.6 X10(3) UL (ref 4–11)

## 2024-03-17 PROCEDURE — 71045 X-RAY EXAM CHEST 1 VIEW: CPT | Performed by: EMERGENCY MEDICINE

## 2024-03-17 PROCEDURE — 71260 CT THORAX DX C+: CPT | Performed by: EMERGENCY MEDICINE

## 2024-03-17 PROCEDURE — 70496 CT ANGIOGRAPHY HEAD: CPT | Performed by: STUDENT IN AN ORGANIZED HEALTH CARE EDUCATION/TRAINING PROGRAM

## 2024-03-17 PROCEDURE — 70498 CT ANGIOGRAPHY NECK: CPT | Performed by: STUDENT IN AN ORGANIZED HEALTH CARE EDUCATION/TRAINING PROGRAM

## 2024-03-17 PROCEDURE — 70450 CT HEAD/BRAIN W/O DYE: CPT | Performed by: EMERGENCY MEDICINE

## 2024-03-17 RX ORDER — IPRATROPIUM BROMIDE AND ALBUTEROL SULFATE 2.5; .5 MG/3ML; MG/3ML
3 SOLUTION RESPIRATORY (INHALATION) ONCE
Status: COMPLETED | OUTPATIENT
Start: 2024-03-17 | End: 2024-03-17

## 2024-03-17 RX ORDER — ACETAMINOPHEN 500 MG
500 TABLET ORAL EVERY 4 HOURS PRN
Status: DISCONTINUED | OUTPATIENT
Start: 2024-03-17 | End: 2024-03-17

## 2024-03-17 RX ORDER — CETIRIZINE HYDROCHLORIDE 10 MG/1
10 TABLET ORAL DAILY
Status: DISCONTINUED | OUTPATIENT
Start: 2024-03-17 | End: 2024-03-24

## 2024-03-17 RX ORDER — CLOPIDOGREL BISULFATE 75 MG/1
75 TABLET ORAL DAILY
Status: DISCONTINUED | OUTPATIENT
Start: 2024-03-18 | End: 2024-03-24

## 2024-03-17 RX ORDER — METHYLPREDNISOLONE SODIUM SUCCINATE 125 MG/2ML
80 INJECTION, POWDER, LYOPHILIZED, FOR SOLUTION INTRAMUSCULAR; INTRAVENOUS ONCE
Status: COMPLETED | OUTPATIENT
Start: 2024-03-17 | End: 2024-03-17

## 2024-03-17 RX ORDER — ATORVASTATIN CALCIUM 40 MG/1
80 TABLET, FILM COATED ORAL NIGHTLY
Status: DISCONTINUED | OUTPATIENT
Start: 2024-03-17 | End: 2024-03-24

## 2024-03-17 RX ORDER — ASPIRIN 325 MG
325 TABLET ORAL ONCE
Status: COMPLETED | OUTPATIENT
Start: 2024-03-17 | End: 2024-03-17

## 2024-03-17 RX ORDER — ENOXAPARIN SODIUM 100 MG/ML
40 INJECTION SUBCUTANEOUS DAILY
Status: DISCONTINUED | OUTPATIENT
Start: 2024-03-18 | End: 2024-03-24

## 2024-03-17 RX ORDER — IPRATROPIUM BROMIDE AND ALBUTEROL SULFATE 2.5; .5 MG/3ML; MG/3ML
3 SOLUTION RESPIRATORY (INHALATION)
Status: DISCONTINUED | OUTPATIENT
Start: 2024-03-18 | End: 2024-03-24

## 2024-03-17 RX ORDER — CLOPIDOGREL BISULFATE 75 MG/1
75 TABLET ORAL ONCE
Status: COMPLETED | OUTPATIENT
Start: 2024-03-17 | End: 2024-03-17

## 2024-03-17 RX ORDER — PANTOPRAZOLE SODIUM 20 MG/1
20 TABLET, DELAYED RELEASE ORAL
Status: DISCONTINUED | OUTPATIENT
Start: 2024-03-18 | End: 2024-03-24

## 2024-03-17 RX ORDER — PROCHLORPERAZINE EDISYLATE 5 MG/ML
5 INJECTION INTRAMUSCULAR; INTRAVENOUS EVERY 8 HOURS PRN
Status: DISCONTINUED | OUTPATIENT
Start: 2024-03-17 | End: 2024-03-17

## 2024-03-17 RX ORDER — ASPIRIN 81 MG/1
81 TABLET, CHEWABLE ORAL DAILY
Status: DISCONTINUED | OUTPATIENT
Start: 2024-03-18 | End: 2024-03-24

## 2024-03-17 RX ORDER — GUAIFENESIN 600 MG/1
600 TABLET, EXTENDED RELEASE ORAL 2 TIMES DAILY
Status: DISCONTINUED | OUTPATIENT
Start: 2024-03-17 | End: 2024-03-19

## 2024-03-17 RX ORDER — IPRATROPIUM BROMIDE AND ALBUTEROL SULFATE 2.5; .5 MG/3ML; MG/3ML
3 SOLUTION RESPIRATORY (INHALATION)
Status: DISCONTINUED | OUTPATIENT
Start: 2024-03-17 | End: 2024-03-17

## 2024-03-17 RX ORDER — HYDRALAZINE HYDROCHLORIDE 20 MG/ML
10 INJECTION INTRAMUSCULAR; INTRAVENOUS EVERY 2 HOUR PRN
Status: DISCONTINUED | OUTPATIENT
Start: 2024-03-17 | End: 2024-03-24

## 2024-03-17 RX ORDER — ONDANSETRON 2 MG/ML
4 INJECTION INTRAMUSCULAR; INTRAVENOUS EVERY 6 HOURS PRN
Status: DISCONTINUED | OUTPATIENT
Start: 2024-03-17 | End: 2024-03-17

## 2024-03-17 RX ORDER — ONDANSETRON 2 MG/ML
4 INJECTION INTRAMUSCULAR; INTRAVENOUS EVERY 6 HOURS PRN
Status: DISCONTINUED | OUTPATIENT
Start: 2024-03-17 | End: 2024-03-24

## 2024-03-17 RX ORDER — ACETAMINOPHEN 650 MG/1
650 SUPPOSITORY RECTAL EVERY 4 HOURS PRN
Status: DISCONTINUED | OUTPATIENT
Start: 2024-03-17 | End: 2024-03-24

## 2024-03-17 RX ORDER — METHYLPREDNISOLONE SODIUM SUCCINATE 125 MG/2ML
60 INJECTION, POWDER, LYOPHILIZED, FOR SOLUTION INTRAMUSCULAR; INTRAVENOUS EVERY 12 HOURS
Status: DISCONTINUED | OUTPATIENT
Start: 2024-03-17 | End: 2024-03-19

## 2024-03-17 RX ORDER — FUROSEMIDE 40 MG/1
40 TABLET ORAL DAILY
Status: DISCONTINUED | OUTPATIENT
Start: 2024-03-18 | End: 2024-03-24

## 2024-03-17 RX ORDER — SODIUM CHLORIDE 9 MG/ML
INJECTION, SOLUTION INTRAVENOUS CONTINUOUS
Status: DISCONTINUED | OUTPATIENT
Start: 2024-03-17 | End: 2024-03-18

## 2024-03-17 RX ORDER — LABETALOL HYDROCHLORIDE 5 MG/ML
10 INJECTION, SOLUTION INTRAVENOUS EVERY 10 MIN PRN
Status: DISCONTINUED | OUTPATIENT
Start: 2024-03-17 | End: 2024-03-24

## 2024-03-17 RX ORDER — ASPIRIN 300 MG/1
300 SUPPOSITORY RECTAL ONCE
Status: DISCONTINUED | OUTPATIENT
Start: 2024-03-17 | End: 2024-03-17

## 2024-03-17 RX ORDER — ACETAMINOPHEN 325 MG/1
650 TABLET ORAL EVERY 4 HOURS PRN
Status: DISCONTINUED | OUTPATIENT
Start: 2024-03-17 | End: 2024-03-24

## 2024-03-17 RX ORDER — AZITHROMYCIN 250 MG/1
500 TABLET, FILM COATED ORAL DAILY
Status: DISCONTINUED | OUTPATIENT
Start: 2024-03-17 | End: 2024-03-19

## 2024-03-17 RX ORDER — PROCHLORPERAZINE EDISYLATE 5 MG/ML
5 INJECTION INTRAMUSCULAR; INTRAVENOUS EVERY 8 HOURS PRN
Status: DISCONTINUED | OUTPATIENT
Start: 2024-03-17 | End: 2024-03-24

## 2024-03-17 RX ORDER — IPRATROPIUM BROMIDE AND ALBUTEROL SULFATE 2.5; .5 MG/3ML; MG/3ML
3 SOLUTION RESPIRATORY (INHALATION) EVERY 6 HOURS PRN
Status: DISCONTINUED | OUTPATIENT
Start: 2024-03-17 | End: 2024-03-24

## 2024-03-17 NOTE — ED PROVIDER NOTES
Patient Seen in: [facility] Emergency Department    History     Chief Complaint   Patient presents with    Difficulty Breathing       HPI    History is provided by patient/independent historian: Patient, EMS, patient's family  64 year old female with history of CHF, COPD, hypertension, here with multiple complaints.  Patient had her  call 911 because she thought she may have had a stroke in the last 1 to 2 days.  She has been having some \"fine motor difficulties \"in both hands, right greater than left, and family reports that her speech is slurred.  They also reports she is slower to respond than usual.  No known trauma.  Of note, she was recently recovering from flulike symptoms that her  was also sick with with.  Patient is noncompliant with medications and does not typically follow with a primary care doctor.    History reviewed.   Past Medical History:   Diagnosis Date    Cardiomyopathy (HCC)     CHF (congestive heart failure) ([facility])     COPD (chronic obstructive pulmonary disease) ([facility])     Defibrillator discharge     Essential hypertension          History reviewed. History reviewed. No pertinent surgical history.      Home Medications reviewed :  Medications Prior to Admission   Medication Sig Dispense Refill Last Dose    Albuterol Sulfate HFA (PROVENTIL HFA) 108 (90 Base) MCG/ACT Inhalation Aero Soln Inhale 1 puff into the lungs every 4 (four) hours as needed for Wheezing. 1 Inhaler 0 3/17/2024    carvedilol 6.25 MG Oral Tab Take 1 tablet (6.25 mg total) by mouth 2 (two) times daily with meals.   3/17/2024    furosemide 40 MG Oral Tab Take 1 tablet (40 mg total) by mouth daily.   3/17/2024    Enalapril Maleate 2.5 MG Oral Tab Take 1 tablet (2.5 mg total) by mouth 2 (two) times daily.   3/17/2024    loratadine 10 MG Oral Tab Take 1 tablet (10 mg total) by mouth daily.   3/17/2024    omeprazole 20 MG Oral Capsule Delayed Release Take 1 capsule (20 mg total) by mouth every morning.    3/17/2024    Potassium Chloride ER 10 MEQ Oral Tab CR Take 2 tablets (20 mEq total) by mouth daily.   Past Month         History reviewed.   Social History     Socioeconomic History    Marital status:    Tobacco Use    Smoking status: Some Days    Smokeless tobacco: Never   Substance and Sexual Activity    Alcohol use: No    Drug use: No         ROS  Review of Systems   Respiratory:  Positive for cough. Negative for shortness of breath.    Cardiovascular:  Negative for chest pain.   Neurological:  Positive for weakness, numbness and headaches.   All other systems reviewed and are negative.     All other pertinent organ systems are reviewed and are negative.      Physical Exam     ED Triage Vitals   BP 03/17/24 1530 (!) 123/100   Pulse 03/17/24 1530 (!) 176   Resp 03/17/24 1530 19   Temp 03/17/24 1535 99.2 °F (37.3 °C)   Temp src 03/17/24 1535 Temporal   SpO2 03/17/24 1530 (!) 88 %   O2 Device 03/17/24 1530 Nasal cannula     Vital signs reviewed.      Physical Exam  Vitals and nursing note reviewed.   Cardiovascular:      Pulses: Normal pulses.   Pulmonary:      Effort: No respiratory distress.   Abdominal:      General: There is no distension.   Neurological:      Mental Status: She is alert.      Comments: R mild facial droop, dysarthria, 4/5 strength in RUE, 5/5 strength in LUE, LLE, RLE, decreased sensation to RLE to light touch, no word finding difficulty         ED Course       Labs:     Labs Reviewed   PRO BETA NATRIURETIC PEPTIDE - Abnormal; Notable for the following components:       Result Value    Beta Natriuretic Peptide 252 (*)     All other components within normal limits   COMP METABOLIC PANEL (14) - Abnormal; Notable for the following components:    Glucose 129 (*)     Sodium 135 (*)     Potassium 3.3 (*)     Chloride 90 (*)     CO2 >40.0 (*)     BUN 25 (*)     BUN/CREA Ratio 28.1 (*)     Calcium, Total 8.3 (*)     AST 38 (*)     Alkaline Phosphatase 41 (*)     Bilirubin, Total <0.2 (*)      Globulin  2.7 (*)     All other components within normal limits   URINALYSIS WITH CULTURE REFLEX - Abnormal; Notable for the following components:    Ketones Urine Trace (*)     Blood Urine 1+ (*)     Protein Urine 50 (*)     Squamous Epi. Cells Few (*)     Hyaline Casts Present (*)     All other components within normal limits   ARTERIAL BLOOD GAS - Abnormal; Notable for the following components:    ABG pCO2 95 (*)     ABG PO2 59 (*)     ABG HCO3 43.8 (*)     Blood Gas Base Excess 24.7 (*)     ABG O2 Saturation 87.9 (*)     All other components within normal limits   DRUG ABUSE PANEL 10 W CONFIRM - Abnormal; Notable for the following components:    Cannabinoid Urine Presumed Positive (*)     All other components within normal limits   HEMOGLOBIN A1C - Abnormal; Notable for the following components:    HgbA1C 6.6 (*)     Estimated Average Glucose 143 (*)     All other components within normal limits   PROCALCITONIN - Abnormal; Notable for the following components:    Procalcitonin 0.09 (*)     All other components within normal limits   CBC, PLATELET; NO DIFFERENTIAL - Abnormal; Notable for the following components:    MCHC 30.6 (*)     All other components within normal limits   COMP METABOLIC PANEL (14) - Abnormal; Notable for the following components:    Glucose 151 (*)     Chloride 94 (*)     CO2 >40.0 (*)     BUN/CREA Ratio 23.4 (*)     Calcium, Total 8.6 (*)     Alkaline Phosphatase 43 (*)     Bilirubin, Total <0.2 (*)     All other components within normal limits   LIPID PANEL - Abnormal; Notable for the following components:    Cholesterol, Total 212 (*)     HDL Cholesterol 34 (*)     LDL Cholesterol 156 (*)     Non HDL Chol 178 (*)     All other components within normal limits   ARTERIAL BLOOD GAS - Abnormal; Notable for the following components:    ABG pH 7.28 (*)     ABG pCO2 119 (*)     ABG PO2 66 (*)     ABG O2 Saturation 91.6 (*)     All other components within normal limits   ARTERIAL BLOOD GAS -  Abnormal; Notable for the following components:    ABG pH 7.30 (*)     ABG pCO2 114 (*)     ABG HCO3 42.6 (*)     Blood Gas Base Excess 22.8 (*)     All other components within normal limits   ARTERIAL BLOOD GAS - Abnormal; Notable for the following components:    ABG pH 7.30 (*)     ABG pCO2 119 (*)     All other components within normal limits   ARTERIAL BLOOD GAS - Abnormal; Notable for the following components:    ABG pH 7.32 (*)     ABG pCO2 113 (*)     ABG PO2 72 (*)     ABG HCO3 44.2 (*)     Blood Gas Base Excess 24.9 (*)     All other components within normal limits    Narrative:     AVAPS 22/550/MAX36/MIN 15/EPAP 8 FIO2 60%   POCT GLUCOSE - Abnormal; Notable for the following components:    POC Glucose  125 (*)     All other components within normal limits   POCT GLUCOSE - Abnormal; Notable for the following components:    POC Glucose  178 (*)     All other components within normal limits   POCT GLUCOSE - Abnormal; Notable for the following components:    POC Glucose  130 (*)     All other components within normal limits   POCT GLUCOSE - Abnormal; Notable for the following components:    POC Glucose  147 (*)     All other components within normal limits   POCT GLUCOSE - Abnormal; Notable for the following components:    POC Glucose  146 (*)     All other components within normal limits   SARS-COV-2/FLU A AND B/RSV BY PCR (GENEXPERT) - Abnormal; Notable for the following components:    Influenza A by PCR Positive (*)     All other components within normal limits    Narrative:     This test is intended for the qualitative detection and differentiation of SARS-CoV-2, influenza A, influenza B, and respiratory syncytial virus (RSV) viral RNA in nasopharyngeal or nares swabs from individuals suspected of respiratory viral infection consistent with COVID-19 by their healthcare provider. Signs and symptoms of respiratory viral infection due to SARS-CoV-2, influenza, and RSV can be similar.                                     Test performed using the Xpert Xpress SARS-CoV-2/FLU/RSV (real time RT-PCR)  assay on the GeneXpert instrument, [employer], [place] [address].                   This test is being used under the Food and Drug Administration's Emergency Use Authorization.                                    The authorized Fact Sheet for Healthcare Providers for this assay is available upon request from the laboratory.   CBC W/ DIFFERENTIAL - Abnormal; Notable for the following components:    Lymphocyte Absolute 0.86 (*)     Monocyte Absolute 1.29 (*)     All other components within normal limits   TROPONIN I HIGH SENSITIVITY - Normal   TSH W REFLEX TO FREE T4 - Normal   VITAMIN B12 - Normal   CBC WITH DIFFERENTIAL WITH PLATELET    Narrative:     The following orders were created for panel order CBC With Differential With Platelet.                  Procedure                               Abnormality         Status                                     ---------                               -----------         ------                                     CBC W/ DIFFERENTIAL[[id number]]          Abnormal            Final result                                                 Please view results for these tests on the individual orders.   VITAMIN B1 (THIAMINE), BLOOD   CANNABINOID CONFIRMATION, UR   RAINBOW DRAW LAVENDER   RAINBOW DRAW LIGHT GREEN   RAINBOW DRAW BLUE   SPUTUM CULTURE   MRSA CULTURE ONLY         My EKG Interpretation: EKG    Rate, intervals and axes as noted on EKG Report.  Rate: 77  Rhythm: Sinus Rhythm with PVC  Reading: normal for rate, abnormal for rhythm, no acute ST changes           As reviewed and Interpreted by me      Imaging Results Available and Reviewed while in ED:   CTA BRAIN + CTA CAROTIDS (CPT=70496/00665)    Result Date: 3/18/2024  CONCLUSION:  1. No flow limiting stenosis or occlusion of the major cervical/intracranial arteries. 2. Mild calcific atherosclerosis involving the right carotid bifurcation  resulting in a less than 50% luminal stenosis. 3. 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 4. Lesser incidental findings as above.   A preliminary report was issued by the [facility] Radiology teleradiology service. There are no major discrepancies.  [employer]m-remote  Dictated by (CST): Sarkis Steve MD on 3/18/2024 at 8:37 AM     Finalized by (CST): Sarkis Steve MD on 3/18/2024 at 8:44 AM         My review and independent interpretation of XR images: b/l infiltrates. Radiology report corroborates this in addition to other details as reported by them.      Decision rules/scores evaluated: none      Diagnostic labs/tests considered but not ordered: none    ED Medications Administered:   Medications   enoxaparin (Lovenox) 40 MG/0.4ML SUBQ injection 40 mg (40 mg Subcutaneous Given 3/18/24 0849)   acetaminophen (Tylenol) tab 650 mg (has no administration in time range)     Or   acetaminophen (Tylenol) rectal suppository 650 mg (has no administration in time range)   labetalol (Trandate) 5 mg/mL injection 10 mg (has no administration in time range)   hydrALAzine (Apresoline) 20 mg/mL injection 10 mg (has no administration in time range)   ondansetron (Zofran) 4 MG/2ML injection 4 mg (has no administration in time range)   prochlorperazine (Compazine) 10 MG/2ML injection 5 mg (has no administration in time range)   atorvastatin (Lipitor) tab 80 mg (80 mg Oral Given 3/17/24 2107)   aspirin chewable tab 81 mg (81 mg Oral Not Given 3/18/24 0930)   methylPREDNISolone sodium succinate (Solu-MEDROL) injection 60 mg (60 mg Intravenous Given 3/18/24 0848)   ipratropium-albuterol (Duoneb) 0.5-2.5 (3) MG/3ML inhalation solution 3 mL (has no administration in time range)   azithromycin (Zithromax) tab 500 mg (500 mg Oral Not Given 3/18/24 0930)   guaiFENesin ER (Mucinex) 12 hr tab 600 mg (600 mg Oral Not Given 3/18/24 0900)   cefTRIAXone (Rocephin) 1 g in D5W 100 mL IVPB-ADD  (1 g Intravenous New Bag 3/17/24 2107)   clopidogrel (Plavix) tab 75 mg (75 mg Oral Not Given 3/18/24 0930)   furosemide (Lasix) tab 40 mg (40 mg Oral Not Given 3/18/24 0930)   cetirizine (ZyrTEC) tab 10 mg (10 mg Oral Not Given 3/18/24 0930)   pantoprazole (Protonix) DR tab 20 mg (20 mg Oral Not Given 3/18/24 0700)   influenza vaccine split quad (Fluzone QIV) 0.5 mL IM injection (ages 6 months to 64 years) 0.5 mL (has no administration in time range)   ipratropium-albuterol (Duoneb) 0.5-2.5 (3) MG/3ML inhalation solution 3 mL (3 mL Nebulization Given 3/18/24 1629)   haloperidol lactate (Haldol) 5 MG/ML injection 5 mg (5 mg Intramuscular Not Given 3/18/24 0215)   thiamine 100 mg/mL injection 500 mg (500 mg Intravenous Given 3/18/24 1713)   methylPREDNISolone sodium succinate (Solu-MEDROL) injection 80 mg (80 mg Intravenous Given 3/17/24 1845)   ipratropium-albuterol (Duoneb) 0.5-2.5 (3) MG/3ML inhalation solution 3 mL (3 mL Nebulization Given 3/17/24 1807)   clopidogrel (Plavix) tab 75 mg (75 mg Oral Given 3/17/24 1844)   aspirin tab 325 mg (325 mg Oral Given 3/17/24 1844)   iopamidol 76% (ISOVUE-370) injection for power injector (80 mL Intravenous Given 3/17/24 1845)   iopamidol 76% (ISOVUE-370) injection for power injector (80 mL Intravenous Given 3/17/24 2253)   haloperidol lactate (Haldol) 5 MG/ML injection (  Given by Other 3/18/24 0215)   potassium chloride 40 mEq in 250mL sodium chloride 0.9% IVPB premix (40 mEq Intravenous New Bag 3/18/24 0330)                MDM       Medical Decision Making      Differential Diagnosis: After obtaining the patient's history, performing the physical exam and reviewing the diagnostics, multiple initial diagnoses were considered based on the presenting problem including CVA, ICH, alcohol intoxication, hypercapnia respiratory failure    External document review: I personally reviewed available external medical records for any recent pertinent discharge summaries, testing, and  procedures - the findings are as follows: 3/17/17 admission for influenza A    Complicating Factors: The patient already  has a past medical history of Cardiomyopathy (HCC), CHF (congestive heart failure) (HCC), COPD (chronic obstructive pulmonary disease) (HCC), Defibrillator discharge, and Essential hypertension. to contribute to the complexity of this ED evaluation.    Procedures performed: none    Discussed management with physician/appropriate source: Dr. Reyes, Dr. Bosch, Dr. Estrada    Considered admission/deescalation of care for: hypoxia, CVA    Social determinants of health affecting patient care: none    Prescription medications considered: discussed continuing current medication regimen    The patient requires continuous monitoring for: weakness    Shared decision making: discussed possible admission    Critical Care Time:  Total critical care time for this patient was 46 minutes exclusive of separately billable procedures, but in obtaining history, performing a physical exam, bedside monitoring of interventions, collecting and interpreting test, and discussion with consultants.        Disposition and Plan     Clinical Impression:  1. Dysarthria    2. Hypoxia    3. Hypercapnia    4. Influenza        Disposition:  Admit    Follow-up:  No follow-up provider specified.    Medications Prescribed:  Current Discharge Medication List          Hospital Problems       Present on Admission  Date Reviewed: 3/31/2019            ICD-10-CM Noted POA    * (Principal) Dysarthria R47.1 3/17/2024 Unknown    Hypercapnia R06.89 3/17/2024 Unknown    Hypoxia R09.02 3/17/2024 Unknown    Influenza J11.1 3/17/2024 Unknown

## 2024-03-17 NOTE — ED QUICK NOTES
OPHELIA ZHAO made aware of patient have a Metronic Defib implanted from 2008. Per patient \"battery no longer on.\"

## 2024-03-17 NOTE — H&P
[facility] Hospitalist H&P       CC:   Chief Complaint   Patient presents with    Difficulty Breathing        PCP: MARLA BARNETT    History of Present Illness: Patient is a 64 year old female with PMH sig for nonischemic cardiomyopathy s/p AICD, COPD, daily tobacco use, prior etoh abuse, and HTN who presented to the hospital with multiple complaints including shortness of breath, weakness, slurred speech, difficulty ambulating. Patient is very tangential when giving details of her medical history in addition to HPI, and therefore it was extremely difficult to collect. Reportedly patients  started to have flu like symptoms 7 days prior. At first patient felt okay but then she started to develop the same symptoms about 2 days later. This included worsening shortness of breath, fevers ( t max of 100.6), congestion, weakness. She normally has a chronic cough with clear sputum production but then started to notice that her sputum was turning brown. As these symptoms were progressively worsening she also started to feel more confused and sleepy. She felt like she was having difficulty with fine motor tasks including the inability to hold a cup or cutlery. She has a \" minor\" tremor at BL, but this tremor was exacerbated. She has been mostly sleeping over the past week and when she did get up yesterday to go to the cough she says that she was having difficulty walking and felt very off balance. No numbness or tingling. No CP, abdominal pain, nausea, vomiting, diarrhea. Today she told her  that she felt as if she had a stroke and requested that EMS be called. Upon arrival, EMS noted that she was hypoxemic with an SpO2 of 59% on RA. She was started on a 15L NRB and brought to the ED.     Upon arrival, she had a minor T of 99.2. Labwork was remarkable for a PCO2 of 95 on ABG with a pH of 7.38. HCO3 > 40, , no leukocytosis. She was given IV steroids and nebulizer treatment and admitted for  further medical management.     PMH  Past Medical History:   Diagnosis Date    Cardiomyopathy (HCC)     CHF (congestive heart failure) (HCC)     COPD (chronic obstructive pulmonary disease) (HCC)     Defibrillator discharge     Essential hypertension         PSH  History reviewed. No pertinent surgical history.     ALL:  No Known Allergies     Home Medications:  Outpatient Medications Marked as Taking for the 3/17/24 encounter (Hospital Encounter)   Medication Sig Dispense Refill    carvedilol 6.25 MG Oral Tab Take 1 tablet (6.25 mg total) by mouth 2 (two) times daily with meals.      furosemide 40 MG Oral Tab Take 1 tablet (40 mg total) by mouth daily.      Enalapril Maleate 2.5 MG Oral Tab Take 1 tablet (2.5 mg total) by mouth 2 (two) times daily.      loratadine 10 MG Oral Tab Take 1 tablet (10 mg total) by mouth daily.      omeprazole 20 MG Oral Capsule Delayed Release Take 1 capsule (20 mg total) by mouth every morning.           Soc Hx  Social History     Tobacco Use    Smoking status: Some Days    Smokeless tobacco: Never   Substance Use Topics    Alcohol use: No        Fam Hx  No family history on file.    Review of Systems  Comprehensive ROS reviewed and negative except for what's stated above.     OBJECTIVE:  /72   Pulse 72   Temp 99.2 °F (37.3 °C) (Temporal)   Resp 14   Wt 178 lb (80.7 kg)   SpO2 (!) 89%   BMI 29.17 kg/m²   General:  Alert,   Head:  Normocephalic               Neck: Supple   Lungs:   Minor wheezing, tight breath sounds       Heart:  Regular rate and rhythm, S1, S2 normal,    Abdomen:   Soft, non-tender. Bowel sounds normal.   Extremities: Extremities delisa       Neurologic: Equal strength in b/l UE and LE, facial droop, slurred speech, A x O x 3, tangential speech     Diagnostic Data:    CBC/Chem  Recent Labs   Lab 03/17/24  1522   WBC 9.6   HGB 14.1   MCV 91.6   .0       Recent Labs   Lab 03/17/24  1522   *   K 3.3*   CL 90*   CO2 >40.0*   BUN 25*   CREATSERUM  0.89   *   CA 8.3*       Recent Labs   Lab 03/17/24  1522   ALT 14   AST 38*   ALB 3.9       No results for input(s): \"TROP\" in the last 168 hours.  Radiology: CT BRAIN OR HEAD (16019)    Result Date: 3/17/2024  CONCLUSION:   No acute intracranial hemorrhage, hydrocephalus, or mass effect.  If there is clinical concern for acute ischemic stroke, MRI of the brain is recommended.    Dictated by (CST): Nicky Rincon MD on 3/17/2024 at 5:18 PM     Finalized by (CST): Nicky Rincon MD on 3/17/2024 at 5:23 PM          XR CHEST AP PORTABLE  (CPT=71045)    Result Date: 3/17/2024  CONCLUSION:  Pulmonary vascular congestion, without overt pulmonary edema.  Bibasilar airspace opacities, which may be secondary to subsegmental atelectasis, with differential of alveolar edema or infection, in the appropriate clinical setting.    Dictated by (CST): Nicky Rincon MD on 3/17/2024 at 4:17 PM     Finalized by (CST): Nicky Rincon MD on 3/17/2024 at 4:19 PM             ASSESSMENT / PLAN:    Patient is a 64 year old female with PMH sig for nonischemic cardiomyopathy s/p AICD, COPD, daily tobacco use, prior etoh abuse, and HTN who presented to the hospital with multiple complaints including shortness of breath, weakness, slurred speech, difficulty ambulating.    Acute hypoxemic and hypercapnic respiratory failure  - SpO2 59% on RA, pCO2 95 on abg  - suspect that she has chronic hypoxemic and hypercapnic respiratory failure in addition to acute insult  - 2/2 due to COPD exacerbation, possible superimposed PNA, will need to r/o PE as well so will get CTA chest  - on 7L NC now, will trial BiPAP  - continuous pulse ox  - supplemental O2 to maintain SpO2 88-92%  - pulm colleagues consulted, appreciate recs    COPD exacerbation  Influenza A infection  Possible PNA  - onset of influenza symptoms 5 days prior, out of window for tamiflu  - IV solumedrol 60 BID  - duonebs q 4 WA and q 6 PRN  - start azithromycin/rocephin  -  check procalcitonin  - mucinex 600 BID  - CTA chest to further help delineate if any consolidation present    Slurred speech, dysarthria, fine tremor, AMS  - CTH in the ED was negative  - hx of ICD, unsure if compatible with MRI but if able MRI brain  - CTA H/N  - echo   - s/p ASA and plavix in the ED, start ASA daily  - neuro consulted, appreciate recs  - lipitor 80 mg nightly  - check lipid panel and hba1c  - PT/OT/SW    AMS  - could be 2/2 to above however suspect component of metabolic encephalopathy from hypercapnia, - does have a hx of significant etoh use, wernickes? But states quit > 8 years ago and goes to AA. Check thaimine  - check TSH and B12 levels as well  - check UDS    HTN  - BP acceptable  - no need for rx now    Hx of ischemic cardiomyopathy s/p AICD  - echo now pending  - holding home HF medications for now    Daily tobacco use  - quit smoking cigarettes in 2000 but smokes 5-6 cigars daily  - did not want nicotine patch    FN:  - IVF: none  - Diet: regular    DVT Prophy: lovenox  Lines: PIV    Dispo: pending clinical course    Outpatient records or previous hospital records reviewed.     Further recommendations pending patient's clinical course.  [facility] hospitalist to continue to follow patient while in house    Patient and/or patient's family given opportunity to ask questions and note understanding and agreeing with therapeutic plan as outlined    Thank You,  Jessica Reyes DO    Hospitalist with [facility]  Answering Service number: [phone]

## 2024-03-17 NOTE — ED INITIAL ASSESSMENT (HPI)
Patient arrives via [facility] from home for weakness and shortness of breath. Weakness has been going on since last Monday when patient suspects she had a \"stroke\"- having difficulty with \"motor skills, not acting how they should be acting\"  Upon medics arriving at 59% on room air.  Medics placed on 15L via nonrebreather. Currently satting between 92-99% on RA.    Hx of CHF, on lasix but noncompliant due to weakness.

## 2024-03-17 NOTE — ED QUICK NOTES
Orders for admission, patient is aware of plan and ready to go upstairs. Any questions, please call ED RN Anna at extension [phone].     Patient Covid vaccination status: Fully vaccinated     COVID Test Ordered in ED: SARS-CoV-2/Flu A and B/RSV by PCR (GeneXpert)    COVID Suspicion at Admission: N/A    Running Infusions:  None    Mental Status/LOC at time of transport: AAOX3    Other pertinent information: 5L o2  CIWA score: N/A   NIH score:  N/A

## 2024-03-17 NOTE — ED QUICK NOTES
Upon writer's assessment, patient is noted alert and oriented x3; very distracted, delayed speech noted. No facial droop. Bilateral equal hand strength, right arm noted with tremors, and drifting. Denies numbness or tingling to any extremity. Patient persistent with feeling more weak then usual. Comfort and safety measures in place.

## 2024-03-18 ENCOUNTER — APPOINTMENT (OUTPATIENT)
Dept: CV DIAGNOSTICS | Facility: HOSPITAL | Age: 64
End: 2024-03-18
Attending: STUDENT IN AN ORGANIZED HEALTH CARE EDUCATION/TRAINING PROGRAM
Payer: MEDICAID

## 2024-03-18 LAB
ALBUMIN SERPL-MCNC: 3.7 G/DL (ref 3.2–4.8)
ALBUMIN/GLOB SERPL: 1.3 {RATIO} (ref 1–2)
ALP LIVER SERPL-CCNC: 43 U/L
ALT SERPL-CCNC: 14 U/L
AST SERPL-CCNC: 27 U/L (ref ?–34)
ATRIAL RATE: 77 BPM
BASE EXCESS BLD CALC-SCNC: 22.8 MMOL/L (ref ?–2)
BASE EXCESS BLD CALC-SCNC: 24.9 MMOL/L (ref ?–2)
BILIRUB SERPL-MCNC: <0.2 MG/DL (ref 0.2–1.1)
BLOOD GAS EPAP: 5 CM H2O
BLOOD GAS EPAP: 5 CM H2O
BLOOD GAS EPAP: 8 CM H2O
BLOOD GAS IPAP: 12 CM H2O
BUN BLD-MCNC: 18 MG/DL (ref 9–23)
BUN/CREAT SERPL: 23.4 (ref 10–20)
CALCIUM BLD-MCNC: 8.6 MG/DL (ref 8.7–10.4)
CHLORIDE SERPL-SCNC: 94 MMOL/L (ref 98–112)
CHOLEST SERPL-MCNC: 212 MG/DL (ref ?–200)
CO2 SERPL-SCNC: >40 MMOL/L (ref 21–32)
CREAT BLD-MCNC: 0.77 MG/DL
DEPRECATED RDW RBC AUTO: 44.5 FL (ref 35.1–46.3)
EGFRCR SERPLBLD CKD-EPI 2021: 87 ML/MIN/1.73M2 (ref 60–?)
ERYTHROCYTE [DISTWIDTH] IN BLOOD BY AUTOMATED COUNT: 13 % (ref 11–15)
GLOBULIN PLAS-MCNC: 2.8 G/DL (ref 2.8–4.4)
GLUCOSE BLD-MCNC: 151 MG/DL (ref 70–99)
GLUCOSE BLDC GLUCOMTR-MCNC: 126 MG/DL (ref 70–99)
GLUCOSE BLDC GLUCOMTR-MCNC: 130 MG/DL (ref 70–99)
GLUCOSE BLDC GLUCOMTR-MCNC: 146 MG/DL (ref 70–99)
GLUCOSE BLDC GLUCOMTR-MCNC: 147 MG/DL (ref 70–99)
GLUCOSE BLDC GLUCOMTR-MCNC: 178 MG/DL (ref 70–99)
HCO3 BLDA-SCNC: 42.6 MEQ/L (ref 21–27)
HCO3 BLDA-SCNC: 44.2 MEQ/L (ref 21–27)
HCT VFR BLD AUTO: 44.8 %
HDLC SERPL-MCNC: 34 MG/DL (ref 40–59)
HGB BLD-MCNC: 13.7 G/DL
INSPIRATION SETTING TIME VENT: 1 SEC (ref 0.1–5)
LDLC SERPL CALC-MCNC: 156 MG/DL (ref ?–100)
MCH RBC QN AUTO: 28.6 PG (ref 26–34)
MCHC RBC AUTO-ENTMCNC: 30.6 G/DL (ref 31–37)
MCV RBC AUTO: 93.5 FL
MODIFIED ALLEN TEST: POSITIVE
NONHDLC SERPL-MCNC: 178 MG/DL (ref ?–130)
O2 CT BLD-SCNC: 17.8 VOL% (ref 15–23)
O2 CT BLD-SCNC: 18.7 VOL% (ref 15–23)
O2 CT BLD-SCNC: 18.8 VOL% (ref 15–23)
O2 CT BLD-SCNC: 19.1 VOL% (ref 15–23)
O2/TOTAL GAS SETTING VFR VENT: 50 %
O2/TOTAL GAS SETTING VFR VENT: 60 %
OSMOLALITY SERPL CALC.SUM OF ELEC: 295 MOSM/KG (ref 275–295)
P AXIS: 71 DEGREES
P-R INTERVAL: 160 MS
PCO2 BLDA: 113 MM HG (ref 35–45)
PCO2 BLDA: 114 MM HG (ref 35–45)
PCO2 BLDA: 119 MM HG (ref 35–45)
PCO2 BLDA: 119 MM HG (ref 35–45)
PH BLDA: 7.28 [PH] (ref 7.35–7.45)
PH BLDA: 7.3 [PH] (ref 7.35–7.45)
PH BLDA: 7.3 [PH] (ref 7.35–7.45)
PH BLDA: 7.32 [PH] (ref 7.35–7.45)
PLATELET # BLD AUTO: 232 10(3)UL (ref 150–450)
PO2 BLDA: 66 MM HG (ref 80–100)
PO2 BLDA: 72 MM HG (ref 80–100)
PO2 BLDA: 82 MM HG (ref 80–100)
PO2 BLDA: 96 MM HG (ref 80–100)
POTASSIUM SERPL-SCNC: 4.4 MMOL/L (ref 3.5–5.1)
PROT SERPL-MCNC: 6.5 G/DL (ref 5.7–8.2)
PUNCTURE CHARGE: YES
Q-T INTERVAL: 424 MS
QRS DURATION: 98 MS
QTC CALCULATION (BEZET): 479 MS
R AXIS: 50 DEGREES
RBC # BLD AUTO: 4.79 X10(6)UL
RESP RATE: 12 BPM
RESP RATE: 18 BPM
RESP RATE: 22 BPM
SAO2 % BLDA: 91.6 % (ref 94–100)
SAO2 % BLDA: 95.2 % (ref 94–100)
SAO2 % BLDA: 95.9 % (ref 94–100)
SAO2 % BLDA: 96.9 % (ref 94–100)
SODIUM SERPL-SCNC: 140 MMOL/L (ref 136–145)
SPECIMEN VOL 24H UR: 500 ML
SPECIMEN VOL 24H UR: 550 ML
T AXIS: 56 DEGREES
TRIGL SERPL-MCNC: 123 MG/DL (ref 30–149)
VENTRICULAR RATE: 77 BPM
VLDLC SERPL CALC-MCNC: 24 MG/DL (ref 0–30)
WBC # BLD AUTO: 7.4 X10(3) UL (ref 4–11)

## 2024-03-18 PROCEDURE — 99291 CRITICAL CARE FIRST HOUR: CPT | Performed by: OTHER

## 2024-03-18 PROCEDURE — 5A09457 ASSISTANCE WITH RESPIRATORY VENTILATION, 24-96 CONSECUTIVE HOURS, CONTINUOUS POSITIVE AIRWAY PRESSURE: ICD-10-PCS | Performed by: INTERNAL MEDICINE

## 2024-03-18 PROCEDURE — 93306 TTE W/DOPPLER COMPLETE: CPT | Performed by: STUDENT IN AN ORGANIZED HEALTH CARE EDUCATION/TRAINING PROGRAM

## 2024-03-18 RX ORDER — THIAMINE HYDROCHLORIDE 100 MG/ML
500 INJECTION, SOLUTION INTRAMUSCULAR; INTRAVENOUS 3 TIMES DAILY
Status: DISCONTINUED | OUTPATIENT
Start: 2024-03-18 | End: 2024-03-21

## 2024-03-18 RX ORDER — DEXMEDETOMIDINE HYDROCHLORIDE 4 UG/ML
INJECTION, SOLUTION INTRAVENOUS CONTINUOUS
Status: DISCONTINUED | OUTPATIENT
Start: 2024-03-18 | End: 2024-03-18

## 2024-03-18 RX ORDER — HALOPERIDOL 5 MG/ML
5 INJECTION INTRAMUSCULAR ONCE
Status: DISCONTINUED | OUTPATIENT
Start: 2024-03-18 | End: 2024-03-19

## 2024-03-18 RX ORDER — HALOPERIDOL 5 MG/ML
INJECTION INTRAMUSCULAR
Status: COMPLETED
Start: 2024-03-18 | End: 2024-03-18

## 2024-03-18 NOTE — PROGRESS NOTES
PT evaluation orders received and chart reviewed. Per nurse, pt is not appropriate for participation today with a decline in status. Pt is not following commands, in restraints, and on AVAPS. Plan to reassess pt's status tomorrow, 3/19.     Thank you,  Marie Keen, PT, DPT

## 2024-03-18 NOTE — PROGRESS NOTES
Pt received from 5th floor RRT. Placed Pt on continuous BiPAP when admitted into CCU. Placed on AVAPS post ABG on the following settings and monitored parameters. Pt tolerated well. RT will continue to monitor.       03/18/24 0422   BiPAP   Mode (S)  AVAPS   Interface Face mask   Mask Size Medium   Control Settings   Set Rate 18 breaths/min   Set EPAP 8   Oxygen Percent 50 %   Inspiratory time 0.8   Insp rise time 2   AVAPS   Min IPAP (S)  15   Max IPAP (S)  30   EPAP Level (S)  8   Set rate (S)  18   Tidal volume (S)  500   BiPAP/CPAP Alarm Settings   Hi Rate 50   Low Rate 10   Hi VT 1500   Low    Hi Pressure 30   Low Pressure 07   Low Pressure Delay 20   Low MV 2   BiPAP/CPAP Monitored Parameters   PIP 20   Total Rate 18 breaths/min   Minute Volume 3   Tidal Volume 2099   Total Leak 47   Trigger % 22   Ti/Ttot % 31   EPAP 8

## 2024-03-18 NOTE — PLAN OF CARE
Problem: Patient Centered Care  Goal: Patient preferences are identified and integrated in the patient's plan of care  Description: Interventions:  - What would you like us to know as we care for you?   - Provide timely, complete, and accurate information to patient/family  - Incorporate patient and family knowledge, values, beliefs, and cultural backgrounds into the planning and delivery of care  - Encourage patient/family to participate in care and decision-making at the level they choose  - Honor patient and family perspectives and choices  Outcome: Progressing     Problem: Safety Risk - Non-Violent Restraints  Goal: Patient will remain free from self-harm  Description: INTERVENTIONS:  - Apply the least restrictive restraint to prevent harm  - Notify patient and family of reasons restraints applied  - Assess for any contributing factors to confusion (electrolyte disturbances, delirium, medications)  - Discontinue any unnecessary medical devices as soon as possible  - Assess the patient's physical comfort, circulation, skin condition, hydration, nutrition and elimination needs   - Reorient and redirection as needed  - Assess for the need to continue restraints  Outcome: Progressing     Problem: RESPIRATORY - ADULT  Goal: Achieves optimal ventilation and oxygenation  Description: INTERVENTIONS:  - Assess for changes in respiratory status  - Assess for changes in mentation and behavior  - Position to facilitate oxygenation and minimize respiratory effort  - Oxygen supplementation based on oxygen saturation or ABGs  - Provide Smoking Cessation handout, if applicable  - Encourage broncho-pulmonary hygiene including cough, deep breathe, Incentive Spirometry  - Assess the need for suctioning and perform as needed  - Assess and instruct to report SOB or any respiratory difficulty  - Respiratory Therapy support as indicated  - Manage/alleviate anxiety  - Monitor for signs/symptoms of CO2 retention  Outcome:  Progressing     Problem: NEUROLOGICAL - ADULT  Goal: Achieves stable or improved neurological status  Description: INTERVENTIONS  - Assess for and report changes in neurological status  - Initiate measures to prevent increased intracranial pressure  - Maintain blood pressure and fluid volume within ordered parameters to optimize cerebral perfusion and minimize risk of hemorrhage  - Monitor temperature, glucose, and sodium. Initiate appropriate interventions as ordered  Outcome: Progressing  Goal: Achieves maximal functionality and self care  Description: INTERVENTIONS  - Monitor swallowing and airway patency with patient fatigue and changes in neurological status  - Encourage and assist patient to increase activity and self care with guidance from PT/OT  - Encourage visually impaired, hearing impaired and aphasic patients to use assistive/communication devices  Outcome: Progressing

## 2024-03-18 NOTE — CM/SW NOTE
03/18/24 1500   CM/SW Referral Data   Referral Source Physician   Reason for Referral Discharge planning;Financial issues   Informant Spouse/Significant Other   Medical Hx   Does patient have an established PCP? Yes   Significant Past Medical/Mental Health Hx Hx cardiac and pulmonary dx   Patient Info   Patient's Home Environment Condo/Apt no elevator   Number of Levels in Home 2   Number of Stair in Home yes  (13)   Patient lives with Spouse/Significant other   Patient Status Prior to Admission   Independent with ADLs and Mobility Yes   Discharge Needs   Anticipated D/C needs To be determined     Caron lives with her spouse in an apartment that has 13 stairs to apt.  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  Her  is retired and receives SS.    Joni from [facility] working with spouse to see if Caron will qualify for medicaid.  If Caron does not qualify, CM to provide resources on [employer].    CM/SW will follow and assist with dc planning needs.    / to remain available for support and/or discharge planning.      Rabia Dhaliwal MBA BSN RN CRRN   RN Case Manager  [phone]

## 2024-03-18 NOTE — PROGRESS NOTES
Called to re-evaluate patient. Still with hypercapnia. She is sleepy, but opens her eyes to voice and is protecting her airway. Discussed intubation with family and they prefer to continue to monitor for now. Will need intubation if mental status worsens. Repeat ABG planned for later today. Additional critical care time: 30 minutes

## 2024-03-18 NOTE — PROGRESS NOTES
Formerly [facility], [facility] and [facility] Hospitalist Progress Note     CC: Hospital Follow up    PCP: MARLA BARNETT       Assessment/Plan:     Principal Problem:    Dysarthria  Active Problems:    Hypoxia    Hypercapnia    Influenza   Patient is a 64 year old female with PMH sig for nonischemic cardiomyopathy s/p AICD, COPD, daily tobacco use, prior etoh abuse, and HTN who presented to the hospital with multiple complaints including shortness of breath, weakness, slurred speech, difficulty ambulating.     Acute hypoxemic and hypercapnic respiratory failure  - SpO2 59% on RA, pCO2 95 on abg initially, increased to 109  - suspect that she has chronic hypoxemic and hypercapnic respiratory failure in addition to acute insult  - 2/2 due to COPD exacerbation, possible superimposed PNA, will need to r/o PE as well so will get CTA chest  - on 7L NC initially, now on BiPAP  - continuous pulse ox  - supplemental O2 to maintain SpO2 88-92%  - pulm colleagues consulted, appreciate recs     COPD exacerbation  Influenza A infection  Possible PNA  - onset of influenza symptoms 5 days prior, out of window for tamiflu  - IV solumedrol 60 BID  - duonebs q 4 WA and q 6 PRN  - start azithromycin/rocephin emperically  - check procalcitonin  - mucinex 600 BID  - CTA chest with no PE     AMS  - ddx includes metabolic encephalopathy from hypercapnia, - does have a hx of significant etoh use, wernickes? But states quit > 8 years ago and goes to AA., CVA  - TSH and B12 normal, B1 levels pending  - UDS + canabis  - CTH in the ED was negative  - hx of ICD, unsure if compatible with MRI but if able MRI brain  - CTA H/N showed no major occlusion or stenosis  - echo   - s/p ASA and plavix in the ED, start ASA daily  - neuro consulted, appreciate recs  - lipitor 80 mg nightly  - hba1c of 6.6  - PT/OT/SW     HTN  - BP acceptable  - no need for rx now     Hx of ischemic cardiomyopathy s/p AICD  - echo now pending  - holding home HF medications for now     Daily  tobacco use  - quit smoking cigarettes in 2000 but smokes 5-6 cigars daily  - did not want nicotine patch    Likely lung CA  - as seen on CTA chest  - discussed with Dr. Cassidy, plan for OP PET      FN:  - IVF: none  - Diet: regular     DVT Prophy: lovenox  Lines: PIV     Dispo: pending clinical course, continue to monitor in ICU. Discussed with son at bedside and gave updates    Addendum* spoke with patient regarding code status on day of admission who said that she wanted to be DNR however  changed to full code. Patient unable to make decisions currently due to her mentation, will need to clarify wishes once she is more awake    Thank You,  Jessica Reyes, DO    Hospitalist with [facility]     Subjective:   Overnight transferred to ICU due to worsening hypercapnia, AMS. Currently on BiPAP    OBJECTIVE:    Blood pressure 93/61, pulse 63, temperature 96.9 °F (36.1 °C), temperature source Temporal, resp. rate 16, height 5' 5\" (1.651 m), weight 176 lb 5.9 oz (80 kg), SpO2 92%.    Temp:  [96.9 °F (36.1 °C)-99.2 °F (37.3 °C)] 96.9 °F (36.1 °C)  Pulse:  [] 63  Resp:  [8-28] 16  BP: ()/() 93/61  SpO2:  [87 %-100 %] 92 %  FiO2 (%):  [50 %-100 %] 50 %      Intake/Output:    Intake/Output Summary (Last 24 hours) at 3/18/2024 0932  Last data filed at 3/18/2024 0600  Gross per 24 hour   Intake 864.2 ml   Output --   Net 864.2 ml       Last 3 Weights   03/18/24 0300 176 lb 5.9 oz (80 kg)   03/17/24 2015 173 lb 3.2 oz (78.6 kg)   03/17/24 1535 178 lb (80.7 kg)   03/31/19 1241 203 lb (92.1 kg)   03/16/17 0600 211 lb 7 oz (95.9 kg)   03/15/17 0400 212 lb 6.4 oz (96.3 kg)   03/13/17 1741 216 lb 8 oz (98.2 kg)   03/13/17 1117 215 lb (97.5 kg)       Exam     General:  Awake   Head:  Normocephalic                     Neck: Supple   Lungs:   BiPAP         Heart:  Regular rate and rhythm, S1, S2 normal,    Abdomen:   Soft, non-tender. Bowel sounds normal.   Extremities: Extremities delisa          Neurologic: Confused, shaking head yes no         Data Review:       Labs:     Recent Labs   Lab 03/17/24  1522 03/18/24  0436   RBC 4.87 4.79   HGB 14.1 13.7   HCT 44.6 44.8   MCV 91.6 93.5   MCH 29.0 28.6   MCHC 31.6 30.6*   RDW 13.0 13.0   NEPRELIM 7.33  --    WBC 9.6 7.4   .0 232.0         Recent Labs   Lab 03/17/24  1522 03/18/24  0436   * 151*   BUN 25* 18   CREATSERUM 0.89 0.77   EGFRCR 73 87   CA 8.3* 8.6*   * 140   K 3.3* 4.4   CL 90* 94*   CO2 >40.0* >40.0*       Recent Labs   Lab 03/17/24  1522 03/18/24  0436   ALT 14 14   AST 38* 27   ALB 3.9 3.7         Imaging:  CTA BRAIN + CTA CAROTIDS (CPT=70496/18130)    Result Date: 3/18/2024  CONCLUSION:  1. No flow limiting stenosis or occlusion of the major cervical/intracranial arteries. 2. Mild calcific atherosclerosis involving the right carotid bifurcation resulting in a less than 50% luminal stenosis. 3. 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 4. Lesser incidental findings as above.   A preliminary report was issued by the [facility] teleradiology service. There are no major discrepancies.  [facility]  Dictated by (CST): Sarkis Steve MD on 3/18/2024 at 8:37 AM     Finalized by (CST): Sarkis Steve MD on 3/18/2024 at 8:44 AM          CT CHEST PE AORTA (IV ONLY) (CPT=71260)    Result Date: 3/17/2024  CONCLUSION:   No evidence of acute pulmonary embolism to the level of the segmental pulmonary artery branches  Spiculated 3.8 cm right lower lobe pulmonary mass.  This is concerning for a primary lung neoplasia, with infection being considered less likely.  Recommend further assessment with tissue sampling.  Multiple additional pulmonary nodules, which of which are spiculated measuring up to 1.5 cm, concerning for sites sites of pulmonary metastatic disease.  A 2.1 cm ground-glass and reticular opacity of the right lower lobe may be infectious or inflammatory in  etiology, with primary lung neoplasia being within the differential.  Further assessment with tissue sampling and/or FDG PET-CT would be helpful.  Bilateral hilar and subcarinal lymph nodes measuring up to 1.1 cm.  These are concerning for waqar metastatic disease.  Reactive etiologies a differential consideration.      Dictated by (CST): Nicky Rincon MD on 3/17/2024 at 7:25 PM     Finalized by (CST): Nicky Rincon MD on 3/17/2024 at 7:38 PM          CT BRAIN OR HEAD (94660)    Result Date: 3/17/2024  CONCLUSION:   No acute intracranial hemorrhage, hydrocephalus, or mass effect.  If there is clinical concern for acute ischemic stroke, MRI of the brain is recommended.    Dictated by (CST): Nicky Rincon MD on 3/17/2024 at 5:18 PM     Finalized by (CST): Nicky Rincon MD on 3/17/2024 at 5:23 PM          XR CHEST AP PORTABLE  (CPT=71045)    Result Date: 3/17/2024  CONCLUSION:  Pulmonary vascular congestion, without overt pulmonary edema.  Bibasilar airspace opacities, which may be secondary to subsegmental atelectasis, with differential of alveolar edema or infection, in the appropriate clinical setting.    Dictated by (CST): Nicky Rincon MD on 3/17/2024 at 4:17 PM     Finalized by (CST): Nicky Rincon MD on 3/17/2024 at 4:19 PM             Meds:      haloperidol lactate  5 mg Intramuscular Once    thiamine  500 mg Intravenous TID    enoxaparin  40 mg Subcutaneous Daily    atorvastatin  80 mg Oral Nightly    aspirin  81 mg Oral Daily    methylPREDNISolone  60 mg Intravenous Q12H    azithromycin  500 mg Oral Daily    guaiFENesin ER  600 mg Oral BID    cefTRIAXone  1 g Intravenous Q24H    clopidogrel  75 mg Oral Daily    furosemide  40 mg Oral Daily    cetirizine  10 mg Oral Daily    pantoprazole  20 mg Oral QAM AC    ipratropium-albuterol  3 mL Nebulization QID      dexmedetomidine Stopped (03/18/24 0400)     acetaminophen **OR** acetaminophen, labetalol, hydrALAzine, ondansetron, prochlorperazine,  ipratropium-albuterol, influenza virus vaccine PF

## 2024-03-18 NOTE — PLAN OF CARE
RRT    *See RRT Documentation Record*    Reason the RRT was called: worsening mental status, non-compliant with care measures, ripped out IV, refusal of BIPAP and hypoxic O2 60's%, combative   Assessment of patient leading up to RRT: mental status gradually worsening since removal of BIPAP  Interventions/Testing: O2 6L, haldol 5mg IM, CCU transfer for precedex   Patient Outcome/Disposition: precedex infusion begun  Family Notified: yes voicemail left  Name of family notified: Gene

## 2024-03-18 NOTE — RESPIRATORY THERAPY NOTE
Pt on BiPAP settings AVAPS/22/550/+5/60%/ MAX 36 MIN 15  Pt tolerating and saturating appropriately.   ABGs done with critical results reported  RT to continue to monitor.

## 2024-03-18 NOTE — CONSULTS
[facility]  [address] [place] [address], [address]  [facility] [address]  [phone]            Caron Daniels Patient Status:  Inpatient    1960 MRN [id number]   Location [facility] 2W/SW Attending Jessica Reyes DO   Hosp Day # 1 PCP MARLA BARNETT     Date of Admission:  3/17/2024  Date of Consult:  3/18/2024  Reason for Consultation:   Difficulty with breathing.      History of Present Illness:   Patient is a 64 year old female who was admitted to the hospital for Dysarthria:  Patient has a history of AICD, COPD, daily tobacco use, prior history alcohol abuse.  Hypertension.  She presents to the hospital with shortness of breath, weakness overall, and slurring of speech, difficulty with ambulating.  Apparently she was very tangential with hospitalist.  According to her  she started having flulike symptoms few days prior to that admission in 2024.  She was developing fevers, shortness of breath, congestion, overall weakness.  Because of this possibility of slurring of speech stroke order set was also placed.  CT of the head and CT angiogram of the head and neck did not show any obvious acute changes.  It was not clear if MRI is compatible with your AICD.      Past Medical History  Past Medical History:   Diagnosis Date    Cardiomyopathy (HCC)     CHF (congestive heart failure) (HCC)     COPD (chronic obstructive pulmonary disease) (HCC)     Defibrillator discharge     Essential hypertension        Past Surgical History  History reviewed. No pertinent surgical history.    Family History  No family history on file.    Social History  Pediatric History   Patient Parents    Not on file     Other Topics Concern    Caffeine Concern Not Asked    Exercise Not Asked    Seat Belt Not Asked    Special Diet Not Asked    Stress Concern Not Asked    Weight Concern Not Asked   Social History Narrative    Not on file           Current Medications:  Current  Facility-Administered Medications   Medication Dose Route Frequency    haloperidol lactate (Haldol) 5 MG/ML injection 5 mg  5 mg Intramuscular Once    dexmedeTOMIDine in sodium chloride 0.9% (Precedex) 400 mcg/100mL infusion premix  0.2-1.5 mcg/kg/hr (Dosing Weight) Intravenous Continuous    thiamine 100 mg/mL injection 500 mg  500 mg Intravenous TID    enoxaparin (Lovenox) 40 MG/0.4ML SUBQ injection 40 mg  40 mg Subcutaneous Daily    sodium chloride 0.9% infusion   Intravenous Continuous    acetaminophen (Tylenol) tab 650 mg  650 mg Oral Q4H PRN    Or    acetaminophen (Tylenol) rectal suppository 650 mg  650 mg Rectal Q4H PRN    labetalol (Trandate) 5 mg/mL injection 10 mg  10 mg Intravenous Q10 Min PRN    hydrALAzine (Apresoline) 20 mg/mL injection 10 mg  10 mg Intravenous Q2H PRN    ondansetron (Zofran) 4 MG/2ML injection 4 mg  4 mg Intravenous Q6H PRN    prochlorperazine (Compazine) 10 MG/2ML injection 5 mg  5 mg Intravenous Q8H PRN    atorvastatin (Lipitor) tab 80 mg  80 mg Oral Nightly    aspirin chewable tab 81 mg  81 mg Oral Daily    methylPREDNISolone sodium succinate (Solu-MEDROL) injection 60 mg  60 mg Intravenous Q12H    ipratropium-albuterol (Duoneb) 0.5-2.5 (3) MG/3ML inhalation solution 3 mL  3 mL Nebulization Q6H PRN    azithromycin (Zithromax) tab 500 mg  500 mg Oral Daily    guaiFENesin ER (Mucinex) 12 hr tab 600 mg  600 mg Oral BID    cefTRIAXone (Rocephin) 1 g in D5W 100 mL IVPB-ADD  1 g Intravenous Q24H    clopidogrel (Plavix) tab 75 mg  75 mg Oral Daily    furosemide (Lasix) tab 40 mg  40 mg Oral Daily    cetirizine (ZyrTEC) tab 10 mg  10 mg Oral Daily    pantoprazole (Protonix) DR tab 20 mg  20 mg Oral QAM AC    influenza vaccine split quad (Fluzone QIV) 0.5 mL IM injection (ages 6 months to 64 years) 0.5 mL  0.5 mL Intramuscular Prior to discharge    ipratropium-albuterol (Duoneb) 0.5-2.5 (3) MG/3ML inhalation solution 3 mL  3 mL Nebulization QID     Medications Prior to Admission    Medication Sig    Albuterol Sulfate HFA (PROVENTIL HFA) 108 (90 Base) MCG/ACT Inhalation Aero Soln Inhale 1 puff into the lungs every 4 (four) hours as needed for Wheezing.    carvedilol 6.25 MG Oral Tab Take 1 tablet (6.25 mg total) by mouth 2 (two) times daily with meals.    furosemide 40 MG Oral Tab Take 1 tablet (40 mg total) by mouth daily.    Enalapril Maleate 2.5 MG Oral Tab Take 1 tablet (2.5 mg total) by mouth 2 (two) times daily.    loratadine 10 MG Oral Tab Take 1 tablet (10 mg total) by mouth daily.    omeprazole 20 MG Oral Capsule Delayed Release Take 1 capsule (20 mg total) by mouth every morning.    Potassium Chloride ER 10 MEQ Oral Tab CR Take 2 tablets (20 mEq total) by mouth daily.       Allergies  No Known Allergies    Review of Systems:   As in HPI, the rest of the 14 system review was done and was negative    Physical Exam:     Vitals:    03/18/24 0400 03/18/24 0500 03/18/24 0600 03/18/24 0700   BP: 102/52 108/61 108/64 93/61   Pulse: 63 62 66 63   Resp: 16 16 17 16   Temp: 96.9 °F (36.1 °C)      TempSrc: Temporal      SpO2: 91% 92% 91% 92%   Weight:       Height:           General: No apparent distress, well nourished, well groomed.  Head- Normocephalic, atraumatic  Eyes- No redness or swelling  ENT- Hearing intake, smell preserved, normal glutition  Neck- No masses or adenopathy  Cv: pulses were palpable and normal, no cyanosis or edema     Neurological:     Mental Status- Alert, able to squeeze her hands slightly to the command.  But otherwise did not seem to be engaged in examination.  Face appears to be symmetric.  No movements of lower extremities.      Results:     Laboratory Data:  Lab Results   Component Value Date    WBC 7.4 03/18/2024    HGB 13.7 03/18/2024    HCT 44.8 03/18/2024    .0 03/18/2024    CREATSERUM 0.77 03/18/2024    BUN 18 03/18/2024     03/18/2024    K 4.4 03/18/2024    CL 94 (L) 03/18/2024    CO2 >40.0 (HH) 03/18/2024     (H) 03/18/2024    CA 8.6  (L) 03/18/2024    ALB 3.7 03/18/2024    ALKPHO 43 (L) 03/18/2024    TP 6.5 03/18/2024    AST 27 03/18/2024    ALT 14 03/18/2024    TSH 1.046 03/17/2024    DDIMER 0.52 03/13/2017    TROP 0.01 03/13/2017    B12 801 03/17/2024         Imaging:    CT CHEST PE AORTA (IV ONLY) (CPT=71260)    Result Date: 3/17/2024  CONCLUSION:   No evidence of acute pulmonary embolism to the level of the segmental pulmonary artery branches  Spiculated 3.8 cm right lower lobe pulmonary mass.  This is concerning for a primary lung neoplasia, with infection being considered less likely.  Recommend further assessment with tissue sampling.  Multiple additional pulmonary nodules, which of which are spiculated measuring up to 1.5 cm, concerning for sites sites of pulmonary metastatic disease.  A 2.1 cm ground-glass and reticular opacity of the right lower lobe may be infectious or inflammatory in etiology, with primary lung neoplasia being within the differential.  Further assessment with tissue sampling and/or FDG PET-CT would be helpful.  Bilateral hilar and subcarinal lymph nodes measuring up to 1.1 cm.  These are concerning for waqar metastatic disease.  Reactive etiologies a differential consideration.      Dictated by (CST): Nicky Rincon MD on 3/17/2024 at 7:25 PM     Finalized by (CST): Nicky Rincon MD on 3/17/2024 at 7:38 PM          CT BRAIN OR HEAD ([id number])    Result Date: 3/17/2024  CONCLUSION:   No acute intracranial hemorrhage, hydrocephalus, or mass effect.  If there is clinical concern for acute ischemic stroke, MRI of the brain is recommended.    Dictated by (CST): Nicky Rincon MD on 3/17/2024 at 5:18 PM     Finalized by (CST): Nicky Rincon MD on 3/17/2024 at 5:23 PM          XR CHEST AP PORTABLE  (CPT=71045)    Result Date: 3/17/2024  CONCLUSION:  Pulmonary vascular congestion, without overt pulmonary edema.  Bibasilar airspace opacities, which may be secondary to subsegmental atelectasis, with differential of  alveolar edema or infection, in the appropriate clinical setting.    Dictated by (CST): Nicky Rincon MD on 3/17/2024 at 4:17 PM     Finalized by (CST): Nicky Rincon MD on 3/17/2024 at 4:19 PM         EKG 12 Lead    Result Date: 3/18/2024  Sinus rhythm with frequent Premature ventricular complexes Otherwise normal ECG No previous ECGs found in [facility]       Impression:     Dysarthria  Most likely due to metabolic encephalopathy and generalized weakness.  However cannot rule out entirely possibility of a stroke.  She was outside the window for any intervention or tenecteplase.    Therefore stroke order set was placed.  CT of the head so far did not show any problems.  It is not clear if MRI is compatible with her ACDF, and if it is not the next day will do a CT of the head again to rule out any other changes.  Thiamine high-dose will be started.      Thank you for allowing me to participate in the care of your patient.    Toro Watts MD  3/18/2024          35+ minutes critical care time spent reviewing record, evaluating patient, speaking with family, discussion w/ colleagues / coordination of studies, documenting in the chart, while at bedside or immediately available.  Patient is critically ill.

## 2024-03-18 NOTE — PROGRESS NOTES
03/18/24 0853   VISIT TYPE   SLP Inpatient Visit Type (Documentation Required) Attempted Evaluation   FOLLOW UP/PLAN   Follow Up Needed (Documentation Required) Yes   SLP Follow-up Date 03/19/24     SLP orders received and acknowledged. SLP attempt this AM. Per RN, pt on continuous AVAPS and not appropriate for swallow evaluation at this time.    Thank you.    Agnes Arenas M.S. CCC-SLP  Speech Language Pathologist  Phone Number [phone]

## 2024-03-18 NOTE — PLAN OF CARE
Received pt from PMU after RRT was called in PMU x agitation/confusion with spo2 in the 70s, Haldol was given during RRT which helped pt's agitation, was placed on cont BIPAP and restraints x safety, spo2 improved, started on Precedex gtt per Dr Robertson, eventually weaned it off at 0400, ABG drawn at 0400 with worsening ABG- Dr Robertson notified and ordered to switch to AVAPs-tolerating well. Updates given to Dr Reyes and Dr Guidry. Called pt's spouse about pt's condition, per - change code status to Full code. Dr Cassidy notified about pt's 's request- Repeat ABG ordered.    Problem: Patient Centered Care  Goal: Patient preferences are identified and integrated in the patient's plan of care  Description: Interventions:  - What would you like us to know as we care for you?   - Provide timely, complete, and accurate information to patient/family  - Incorporate patient and family knowledge, values, beliefs, and cultural backgrounds into the planning and delivery of care  - Encourage patient/family to participate in care and decision-making at the level they choose  - Honor patient and family perspectives and choices  Outcome: Progressing     Problem: RESPIRATORY - ADULT  Goal: Achieves optimal ventilation and oxygenation  Description: INTERVENTIONS:  - Assess for changes in respiratory status  - Assess for changes in mentation and behavior  - Position to facilitate oxygenation and minimize respiratory effort  - Oxygen supplementation based on oxygen saturation or ABGs  - Provide Smoking Cessation handout, if applicable  - Encourage broncho-pulmonary hygiene including cough, deep breathe, Incentive Spirometry  - Assess the need for suctioning and perform as needed  - Assess and instruct to report SOB or any respiratory difficulty  - Respiratory Therapy support as indicated  - Manage/alleviate anxiety  - Monitor for signs/symptoms of CO2 retention  Outcome: Not Progressing     Problem: NEUROLOGICAL  - ADULT  Goal: Achieves stable or improved neurological status  Description: INTERVENTIONS  - Assess for and report changes in neurological status  - Initiate measures to prevent increased intracranial pressure  - Maintain blood pressure and fluid volume within ordered parameters to optimize cerebral perfusion and minimize risk of hemorrhage  - Monitor temperature, glucose, and sodium. Initiate appropriate interventions as ordered  Outcome: Progressing  Goal: Achieves maximal functionality and self care  Description: INTERVENTIONS  - Monitor swallowing and airway patency with patient fatigue and changes in neurological status  - Encourage and assist patient to increase activity and self care with guidance from PT/OT  - Encourage visually impaired, hearing impaired and aphasic patients to use assistive/communication devices  Outcome: Progressing

## 2024-03-18 NOTE — SIGNIFICANT EVENT
Called to bedside for RRT:    Patient became acutely agitated, ripped out IV, refusing BiPAP and NC, noted to be markedly hypoxic, 60's% on RA. Given 5mg IM haldol. Noted DNR/DNI status. Will transfer to PCCU for precedex infusion to assist in NIV compliance.    Brief Exam:  Vitals: Hypoxic, Pulse rate 80-90s, normotensive  Skin: pale, perioral cyanosis  CV: RRR, no murmur  Pulm: Diminished b/l, with exp wheeze  Neuro: agitated, follows some commands, Aox2.

## 2024-03-18 NOTE — PROGRESS NOTES
OT orders received. Chart reviewed. Per nurse, pt is not appropriate for participation today with a decline in status. Pt is not following commands, in restraints, and on AVAPS. Plan to reassess pt's status tomorrow, 3/19.

## 2024-03-19 ENCOUNTER — APPOINTMENT (OUTPATIENT)
Dept: CT IMAGING | Facility: HOSPITAL | Age: 64
End: 2024-03-19
Attending: INTERNAL MEDICINE
Payer: MEDICAID

## 2024-03-19 PROBLEM — Z51.5 PALLIATIVE CARE BY SPECIALIST: Status: ACTIVE | Noted: 2024-03-19

## 2024-03-19 PROBLEM — G93.40 ENCEPHALOPATHY: Status: ACTIVE | Noted: 2024-03-19

## 2024-03-19 PROBLEM — Z71.89 GOALS OF CARE, COUNSELING/DISCUSSION: Status: ACTIVE | Noted: 2024-03-19

## 2024-03-19 PROBLEM — Z71.89 ADVANCE CARE PLANNING: Status: ACTIVE | Noted: 2024-03-19

## 2024-03-19 LAB
AMMONIA PLAS-MCNC: 21 UMOL/L (ref 11–32)
BASE EXCESS BLD CALC-SCNC: 26.8 MMOL/L (ref ?–2)
BLOOD GAS EPAP: 8 CM H2O
BLOOD GAS IPAP: 36 CM H2O
GLUCOSE BLDC GLUCOMTR-MCNC: 107 MG/DL (ref 70–99)
GLUCOSE BLDC GLUCOMTR-MCNC: 119 MG/DL (ref 70–99)
GLUCOSE BLDC GLUCOMTR-MCNC: 133 MG/DL (ref 70–99)
GLUCOSE BLDC GLUCOMTR-MCNC: 134 MG/DL (ref 70–99)
HCO3 BLDA-SCNC: 45.7 MEQ/L (ref 21–27)
MODIFIED ALLEN TEST: POSITIVE
O2 CT BLD-SCNC: 18.5 VOL% (ref 15–23)
O2/TOTAL GAS SETTING VFR VENT: 60 %
PCO2 BLDA: 94 MM HG (ref 35–45)
PH BLDA: 7.4 [PH] (ref 7.35–7.45)
PO2 BLDA: 76 MM HG (ref 80–100)
POTASSIUM SERPL-SCNC: 4.4 MMOL/L (ref 3.5–5.1)
PUNCTURE CHARGE: YES
RESP RATE: 22 BPM
SAO2 % BLDA: 95.8 % (ref 94–100)
SPECIMEN VOL 24H UR: 550 ML

## 2024-03-19 PROCEDURE — 70450 CT HEAD/BRAIN W/O DYE: CPT | Performed by: INTERNAL MEDICINE

## 2024-03-19 PROCEDURE — 99254 IP/OBS CNSLTJ NEW/EST MOD 60: CPT | Performed by: REGISTERED NURSE

## 2024-03-19 PROCEDURE — 99233 SBSQ HOSP IP/OBS HIGH 50: CPT | Performed by: OTHER

## 2024-03-19 RX ORDER — SODIUM CHLORIDE 9 MG/ML
INJECTION, SOLUTION INTRAVENOUS CONTINUOUS
Status: DISCONTINUED | OUTPATIENT
Start: 2024-03-19 | End: 2024-03-20

## 2024-03-19 RX ORDER — SODIUM BICARBONATE 650 MG/1
325 TABLET ORAL AS NEEDED
Status: DISCONTINUED | OUTPATIENT
Start: 2024-03-19 | End: 2024-03-19

## 2024-03-19 RX ORDER — HALOPERIDOL 5 MG/ML
2 INJECTION INTRAMUSCULAR EVERY 6 HOURS PRN
Status: DISCONTINUED | OUTPATIENT
Start: 2024-03-19 | End: 2024-03-24

## 2024-03-19 RX ORDER — DIAZEPAM 5 MG/ML
5 INJECTION, SOLUTION INTRAMUSCULAR; INTRAVENOUS ONCE
Status: DISCONTINUED | OUTPATIENT
Start: 2024-03-19 | End: 2024-03-19

## 2024-03-19 RX ORDER — METHYLPREDNISOLONE SODIUM SUCCINATE 40 MG/ML
40 INJECTION, POWDER, LYOPHILIZED, FOR SOLUTION INTRAMUSCULAR; INTRAVENOUS DAILY
Status: COMPLETED | OUTPATIENT
Start: 2024-03-20 | End: 2024-03-21

## 2024-03-19 RX ORDER — HALOPERIDOL 5 MG/ML
1 INJECTION INTRAMUSCULAR EVERY 6 HOURS PRN
Status: DISCONTINUED | OUTPATIENT
Start: 2024-03-19 | End: 2024-03-19

## 2024-03-19 RX ORDER — DEXMEDETOMIDINE HYDROCHLORIDE 4 UG/ML
INJECTION, SOLUTION INTRAVENOUS CONTINUOUS
Status: DISCONTINUED | OUTPATIENT
Start: 2024-03-19 | End: 2024-03-21

## 2024-03-19 NOTE — PLAN OF CARE
Lethargic on AVAPS through the night. Frequent oral care done. Straight cath x1. Bilateral wrist restraints in place. ROM performed Q 2 hours. Bed locked and in lowest position. Call light within reach. Frequent nursing rounding done. All safety measures maintained.     Problem: Patient Centered Care  Goal: Patient preferences are identified and integrated in the patient's plan of care  Description: Interventions:  - What would you like us to know as we care for you?   - Provide timely, complete, and accurate information to patient/family  - Incorporate patient and family knowledge, values, beliefs, and cultural backgrounds into the planning and delivery of care  - Encourage patient/family to participate in care and decision-making at the level they choose  - Honor patient and family perspectives and choices  Outcome: Progressing     Problem: Safety Risk - Non-Violent Restraints  Goal: Patient will remain free from self-harm  Description: INTERVENTIONS:  - Apply the least restrictive restraint to prevent harm  - Notify patient and family of reasons restraints applied  - Assess for any contributing factors to confusion (electrolyte disturbances, delirium, medications)  - Discontinue any unnecessary medical devices as soon as possible  - Assess the patient's physical comfort, circulation, skin condition, hydration, nutrition and elimination needs   - Reorient and redirection as needed  - Assess for the need to continue restraints  Outcome: Progressing     Problem: RESPIRATORY - ADULT  Goal: Achieves optimal ventilation and oxygenation  Description: INTERVENTIONS:  - Assess for changes in respiratory status  - Assess for changes in mentation and behavior  - Position to facilitate oxygenation and minimize respiratory effort  - Oxygen supplementation based on oxygen saturation or ABGs  - Provide Smoking Cessation handout, if applicable  - Encourage broncho-pulmonary hygiene including cough, deep breathe, Incentive  Spirometry  - Assess the need for suctioning and perform as needed  - Assess and instruct to report SOB or any respiratory difficulty  - Respiratory Therapy support as indicated  - Manage/alleviate anxiety  - Monitor for signs/symptoms of CO2 retention  Outcome: Progressing     Problem: NEUROLOGICAL - ADULT  Goal: Achieves stable or improved neurological status  Description: INTERVENTIONS  - Assess for and report changes in neurological status  - Initiate measures to prevent increased intracranial pressure  - Maintain blood pressure and fluid volume within ordered parameters to optimize cerebral perfusion and minimize risk of hemorrhage  - Monitor temperature, glucose, and sodium. Initiate appropriate interventions as ordered  Outcome: Progressing  Goal: Achieves maximal functionality and self care  Description: INTERVENTIONS  - Monitor swallowing and airway patency with patient fatigue and changes in neurological status  - Encourage and assist patient to increase activity and self care with guidance from PT/OT  - Encourage visually impaired, hearing impaired and aphasic patients to use assistive/communication devices  Outcome: Progressing

## 2024-03-19 NOTE — PROGRESS NOTES
Chart reviewed and spoke with nurse. Pt not appropriate for participation in skilled therapy this date- pt un arousable , unable to follow commands and on continuous AVAPS. Plan to check back on pt status tomorrow, 3/20.

## 2024-03-19 NOTE — PROGRESS NOTES
PT evaluation orders received and chart reviewed. Per RN, pt not following commands to safely participate in therapeutic intervention on this date. High O2 demands. Will reschedule for 3/20/24.     Thank you,  Marie Keen, PT, DPT

## 2024-03-19 NOTE — PROGRESS NOTES
03/19/24 0800   VISIT TYPE   SLP Inpatient Visit Type (Documentation Required) Attempted Evaluation   FOLLOW UP/PLAN   Follow Up Needed (Documentation Required) Yes   SLP Follow-up Date 03/20/24     SLP attempt this AM. Pt remains on AVAPS and not appropriate for oral intake at this time. SLP to follow up as respiratory improves and pt appropriate.    Thank you.    Agnes Arenas M.S. CCC-SLP  Speech Language Pathologist  Phone Number [phone]. [phone]

## 2024-03-19 NOTE — CONSULTS
[facility]  part of [facility]  Palliative Care Initial Consult Note    Caron Daniels Patient Status:  Inpatient    1960 MRN [id number]   Location [facility] 2W/SW Attending Xavier Pérez MD   Hosp Day # 2 PCP MARLA BARNETT     Date of Consult: 3/19/2024  Patient seen at: [facility] In[facility]    The  Cures Act makes medical notes like these available to patients in the interest of transparency. Please be advised this is a medical document. Medical documents are intended to carry relevant information, facts as evident, and the clinical opinion of the practitioner. The medical note is intended as peer to peer communication and may appear blunt or direct. It is written in medical language and may contain abbreviations or verbiage that are unfamiliar.     Reason for Consultation: Consult ordered by:: Jeanne Khan for evaluation of Palliative Care needs and Goals of care discussion.    Subjective     History of Present Illness: Caron Daniels is a 64 year old female with history of COPD, CHF who was admitted on 3/17/2024 for SOB, weakness, concern for stroke with slurred speech. See below for reviewed labs and imaging. Pt was admitted for treatment and evaluation of influenza A, pneumonia, acute hypoxemic/hypercapnic respiratory failure, COPD exacerbation and encephalopathy. RRT called 3/18 for worsening agitation, refusing to wear bipap->transferred to ICU. See below for reviewed imaging.     She is being followed by pulmonary and neurology services.    She has a history of CHF s/p AICD, COPD, tobacco abuse, HTN, and h/o etoh abuse in past. Reviewed labs and imaging. History was obtained from [employer] and pt's DEDE Mullins at bedside.  Today is day 2 of hospitalization. No other recent hospitalizations.    Pt is listed as Full code in Epic,  no advance directives on file. Per notes, pt initially expressed wishes for DNAR/DNI on admission, but her  changed to  full code once pt was no longer decisional.    Reviewed symptom needs past 24 hrs: none    Patient was seen and examined in bed with her DIL Susanna at bedside. Pt is lethargic on AVAPS fio2 60%, no signs of distress. Pt does periodically wake up and attempt to sit up in bed. She is not able to participate in discussion today. No signs of pain. She remains NPO and will have dobhoff placed today. Moved her bowels today. VSS. See physical exam and ROS below.    Review of Systems/Palliative Care symptom needs assessed:   Unable to obtain ROS due to pt factors above    Medical History: obtained from [employer]  Past Medical History:   Diagnosis Date    Cardiomyopathy (HCC)     CHF (congestive heart failure) (HCC)     COPD (chronic obstructive pulmonary disease) (HCC)     Defibrillator discharge     Essential hypertension      History reviewed. No pertinent surgical history.    Family History: obtained from [employer]  No family history on file.    Palliative Care Social History:   Marital Status:   Children: 1 son Tico Navarrete goes by \"Gee\"  Living Situation Prior to Admit: lives with   Does Patient Live Alone: no  Is Patient Confused: not at baseline  Occupational History: pt was still working full time at a title company    Substance History:   reports that she has been smoking. She has never used smokeless tobacco.  reports no history of alcohol use.  reports no history of drug use.  Hx of Substance Use/Abuse: No    Spiritual Assessment:   Synagogue: [employer]   requested: declined    Allergies:  No Known Allergies    Medications:     Current Facility-Administered Medications:     pantoprazole (Protonix) 40 mg in sodium chloride 0.9% PF 10 mL IV push, 40 mg, Intravenous, Daily    azithromycin (Zithromax) 500 mg in sodium chloride 0.9% 250mL IVPB premix, 500 mg, Intravenous, Q24H    sodium chloride 0.9% infusion, , Intravenous, Continuous    haloperidol lactate (Haldol) 5 MG/ML injection 5 mg, 5 mg, Intramuscular,  Once    thiamine 100 mg/mL injection 500 mg, 500 mg, Intravenous, TID    enoxaparin (Lovenox) 40 MG/0.4ML SUBQ injection 40 mg, 40 mg, Subcutaneous, Daily    acetaminophen (Tylenol) tab 650 mg, 650 mg, Oral, Q4H PRN **OR** acetaminophen (Tylenol) rectal suppository 650 mg, 650 mg, Rectal, Q4H PRN    labetalol (Trandate) 5 mg/mL injection 10 mg, 10 mg, Intravenous, Q10 Min PRN    hydrALAzine (Apresoline) 20 mg/mL injection 10 mg, 10 mg, Intravenous, Q2H PRN    ondansetron (Zofran) 4 MG/2ML injection 4 mg, 4 mg, Intravenous, Q6H PRN    prochlorperazine (Compazine) 10 MG/2ML injection 5 mg, 5 mg, Intravenous, Q8H PRN    atorvastatin (Lipitor) tab 80 mg, 80 mg, Oral, Nightly    aspirin chewable tab 81 mg, 81 mg, Oral, Daily    methylPREDNISolone sodium succinate (Solu-MEDROL) injection 60 mg, 60 mg, Intravenous, Q12H    ipratropium-albuterol (Duoneb) 0.5-2.5 (3) MG/3ML inhalation solution 3 mL, 3 mL, Nebulization, Q6H PRN    cefTRIAXone (Rocephin) 1 g in D5W 100 mL IVPB-ADD, 1 g, Intravenous, Q24H    clopidogrel (Plavix) tab 75 mg, 75 mg, Oral, Daily    [Held by provider] furosemide (Lasix) tab 40 mg, 40 mg, Oral, Daily    cetirizine (ZyrTEC) tab 10 mg, 10 mg, Oral, Daily    [Held by provider] pantoprazole (Protonix) DR tab 20 mg, 20 mg, Oral, QAM AC    influenza vaccine split quad (Fluzone QIV) 0.5 mL IM injection (ages 6 months to 64 years) 0.5 mL, 0.5 mL, Intramuscular, Prior to discharge    ipratropium-albuterol (Duoneb) 0.5-2.5 (3) MG/3ML inhalation solution 3 mL, 3 mL, Nebulization, QID    Nutritional status:  BMI: 27 Weight: 163  Weight changes: RENÉE  Current Appetite: Poor    Functional Status History:  ADLs: currently bed bound and total care, normally was independent and still driving prior    Palliative Performance Scale:   Prior to admission: 90%  Observed during hospitalization: 10%  % Ambulation Activity Level Self-Care Intake Consciousness   100 Full  Normal  No Disease Full Normal Full   90 Full   Normal  Some Disease Full Normal Full   80 Full  Normal w/effort  Some Disease Full Normal or reduced Full   70 Reduced  Can't Perform Job  Some Disease Full Normal or reduced Full   60 Reduced  Can't Perform Hobby   Significant Disease Occ Assist Normal or reduced Full or confused   50 Mainly sit/lie Can't do any work  Extensive Disease Partial Assist Normal or reduced Full or confused   40 Mainly in bed Can't do any work  Extensive Disease Mainly Assist Normal or reduced Full or confused   30 Bed Bound Can't do any work  Extensive Disease Max Assist  Total Care Reduced  Drowsy/confused   20 Bed Bound Can't do any work  Extensive Disease Max Assist  Total Care Minimal  Drowsy/confused   10 Bed Bound Can't do any work  Extensive Disease Max Assist  Total Care Mouth Care  Drowsy/confused   0 Death        Objective      Vital Signs:  Blood pressure 131/60, pulse 71, temperature 98 °F (36.7 °C), temperature source Temporal, resp. rate 23, height 5' 5\" (1.651 m), weight 163 lb 9.3 oz (74.2 kg), SpO2 92%.  Body mass index is 27.22 kg/m².  Present Level of pain: RENÉE  Non-verbal signs of pain present: No    Physical Exam:  General: Lethargic and in no apparent distress. Acutely-ill appearing  HEENT: No focal deficits. +dry mucous membranes, +bipap mask  Cardiac: Regular rate and rhythm, S1, S2 normal, no murmur, rub or gallop.  Lungs: Diminished breath sounds bilaterally.  Normal excursions and effort.  Abdomen: Soft, non-tender, normal bowel sounds X 4 quadrants, no rebound or guarding  Extremities: Without clubbing, cyanosis. Peripheral pulses are 2+. BLE Edema not present  Neurologic: Lethargic  Skin: Warm and dry.    Hematology:  Lab Results   Component Value Date    WBC 7.4 03/18/2024    HGB 13.7 03/18/2024    HCT 44.8 03/18/2024    .0 03/18/2024       Coags:No results found for: \"PT\", \"INR\", \"PTT\"    Chemistry:  Lab Results   Component Value Date    CREATSERUM 0.77 03/18/2024    BUN 18 03/18/2024      03/18/2024    K 4.4 03/19/2024    CL 94 (L) 03/18/2024    CO2 >40.0 (HH) 03/18/2024     (H) 03/18/2024    CA 8.6 (L) 03/18/2024    ALB 3.7 03/18/2024    ALKPHO 43 (L) 03/18/2024    BILT <0.2 (L) 03/18/2024    TP 6.5 03/18/2024    AST 27 03/18/2024    ALT 14 03/18/2024    DDIMER 0.52 03/13/2017    TROP 0.01 03/13/2017       Imaging:  CT BRAIN OR HEAD ([id number])    Result Date: 3/19/2024  CONCLUSION:  1. No acute intracranial process by noncontrast CT technique. 2. Intracranial atherosclerosis. 3. Dependent left greater than right maxillary sinus mucosal thickening. 4. Lesser incidental findings as above.   elm-remote  Dictated by (CST): Neftali El MD on 3/19/2024 at 11:45 AM     Finalized by (CST): Neftali El MD on 3/19/2024 at 11:47 AM          CTA BRAIN + CTA CAROTIDS (CPT=70496/17507)    Result Date: 3/18/2024  CONCLUSION:  1. No flow limiting stenosis or occlusion of the major cervical/intracranial arteries. 2. Mild calcific atherosclerosis involving the right carotid bifurcation resulting in a less than 50% luminal stenosis. 3. 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 4. Lesser incidental findings as above.   A preliminary report was issued by the [facility] teleradiology service. There are no major discrepancies.  Elm-remote  Dictated by (CST): Sarkis Steve MD on 3/18/2024 at 8:37 AM     Finalized by (CST): Sarkis Steve MD on 3/18/2024 at 8:44 AM          CT CHEST PE AORTA (IV ONLY) (CPT=71260)    Result Date: 3/17/2024  CONCLUSION:   No evidence of acute pulmonary embolism to the level of the segmental pulmonary artery branches  Spiculated 3.8 cm right lower lobe pulmonary mass.  This is concerning for a primary lung neoplasia, with infection being considered less likely.  Recommend further assessment with tissue sampling.  Multiple additional pulmonary nodules, which of which are spiculated measuring up to 1.5 cm,  concerning for sites sites of pulmonary metastatic disease.  A 2.1 cm ground-glass and reticular opacity of the right lower lobe may be infectious or inflammatory in etiology, with primary lung neoplasia being within the differential.  Further assessment with tissue sampling and/or FDG PET-CT would be helpful.  Bilateral hilar and subcarinal lymph nodes measuring up to 1.1 cm.  These are concerning for waqar metastatic disease.  Reactive etiologies a differential consideration.      Dictated by (CST): Nicky Rincon MD on 3/17/2024 at 7:25 PM     Finalized by (CST): Nicky Rincon MD on 3/17/2024 at 7:38 PM          CT BRAIN OR HEAD ([id number])    Result Date: 3/17/2024  CONCLUSION:   No acute intracranial hemorrhage, hydrocephalus, or mass effect.  If there is clinical concern for acute ischemic stroke, MRI of the brain is recommended.    Dictated by (CST): Nicky Rincon MD on 3/17/2024 at 5:18 PM     Finalized by (CST): Nicky Rincon MD on 3/17/2024 at 5:23 PM          XR CHEST AP PORTABLE  (CPT=71045)    Result Date: 3/17/2024  CONCLUSION:  Pulmonary vascular congestion, without overt pulmonary edema.  Bibasilar airspace opacities, which may be secondary to subsegmental atelectasis, with differential of alveolar edema or infection, in the appropriate clinical setting.    Dictated by (CST): Nicky Rincon MD on 3/17/2024 at 4:17 PM     Finalized by (CST): Nicky Rincon MD on 3/17/2024 at 4:19 PM            Summary of GOC Discussion        I discussed reason for palliative care consultation with patient's DEDE Mullins at bedside. She is  to pt's only son Gene/\"Rey\". Susanna tells me pt's  Gene recently had the flu and has not been able to come in to see pt.     I differentiated the palliative treatment-focus model versus the hospice comfort-focused philosophy of care. I informed the patient/family that having palliative care support does not limit medical treatment options or decisions to those  who wish to continue curative or restorative medical therapies. I discussed the benefits of palliative care to include assistance with arising symptom management needs, an extra layer of support, to ensure GOC are respected throughout healthcare continuum, and assist with transition to hospice care when appropriate.  Palliative care handout provided.    Outpatient/Community Palliative Care Services:  Usually visit once per 4 weeks depending on contracted agency guidelines  Focus on GOC and symptom management   Palliative Care criteria:  Not altered by prognosis   Does not limit curative or restorative therapies      Outpatient Hospice services:  24/7 phone triage services   RN visit one or more times per week depending on need  Home health aid to assist in ADLs/hygiene   Hospice criteria:  Less than six-month prognosis   Must forego most life-prolonging  measures/treatments   Focus solely on comfort   Must sign onto hospice benefit with agency       Prognostic awareness/understanding: Good    -I  discussed current clinical condition and explored previous discussions with MDs.    -I discussed the normal disease trajectory of COPD, respiratory failure, CHF with associated symptoms and decline over time.     Hopes/goals/concerns:   -Susanna has concerns with pt's ongoing respiratory failure and remains hopeful she can improve with ongoing medical treatments.    -Discussed pt's ongoing respiratory failure and risk for decompensation. Susanna tells me on admission pt was clear that she wanted to be DNAR/DNI, but once pt was no longer decisional, pt's  Gene made her full code. Susanna has concerns about pt's QOL if she ended up on a ventilator and also concerns about her being without health insurance.     -Susanna asked me to not call pt's  today as she wants to talk with her  and father in law first about all this. She understands the importance of these discussions. She tells me pt's  has health  issues and she is worried about this causing him a \"heart attack or a mental breakdown. \" I recommended having a family meeting tomorrow and she will call me with a time that would work for the family.     -Ongoing GOC discussions will be needed through clinical course. Agreeable to palliative care following.     Advance Care Planning counseling and discussion:     -I discussed the importance of advance care planning prior to crisis with Susanna. She tells me pt likely has not completed any advance directives in the past. [place] discussed [place]'s  Gene would be HC surrogate. Susanna worked as a  in the past and is helping family navigate the healthcare system.     -I addressed pt's full code status discussed the risks vs benefits of life sustaining treatments in the setting of her clinical condition. Recommended family respect pt's previous wishes and consider reinstating DNAR/DNI after further discussion. [place]. Pt will remain full code.     -Provided emotional support to pt's DEDE Mullins who is coping adequately.       Assessment and Recommendations      Problem List:    Acute hypoxemic and hypercapnic respiratory failure  COPD exacerbation  Influenza A infection  Presumed bacterial pneumonia  Encephalopathy  HTN  CHF s/p AICD  Abnormal CT-spiculated RLL pulm mass concerning for lung ca  Tobacco abuse  GERD    Goals of care counseling  -see above for details  -Pt is full code; pt had previously expressed wishes for DNAR/DNI but  reversed to full code.  -Ongoing GOC/code status discussions will be needed through clinical course.  -Recommended family meeting tomorrow for further GOC discussion-awaiting family to notify me of when they can meet.   -Agreeable to palliative care following  -Dispo:   TBD pending clinical course. SW to help with dc planning.   -Provided emotional support to pt's DEDE Mullins who is coping adequately.    Advance  care planning  -see above for details  -Pt's  Tico Daniels is HC surrogate #[phone].  -[place].    Palliative Performance Scale 10%    Emotion support provided to patient/family today: Yes    A total of 60 mins were spent on this consult, which included all of the following:direct face to face contact, history taking, physical examination, and >50% was spent counseling and coordinating care.    Discussed today's visit with message to Dr. Pérez, Nya Vargas RN and Jeanne POP.    I will continue to follow clinically.    Thank you for allowing Palliative Care services to participate in the care of Ms. Caron Daniels.       Kathryn Sheehan, ANP-BC, ACHPN [id number]  3/19/2024  1:53 PM  Palliative Care Services

## 2024-03-19 NOTE — PROGRESS NOTES
Pt received on AVAPS rate 22, , EPAP8, IPAP 15-36 60%. FiO2 weaned down to 50%. ABG taken in AM with slight improvement in PCO2, see results below.  PT taken to CT with no acute events.          03/19/24 1535   Control Settings   Set Rate 22 breaths/min   Oxygen Percent (S)  50 %   Inspiratory time 0.9   AVAPS   Min IPAP 15   Max IPAP 36   EPAP Level 8   Set rate 22   Tidal volume 550

## 2024-03-19 NOTE — PROGRESS NOTES
[facility]   INTERNAL MEDICINE PROGRESS NOTE    CHIEF COMPLAINT   Difficulty Breathing    SUBJECTIVE  24 HR EVENTS   NAEON  On AVAPS x ~ 48 hrs.  Lethargic    INPATIENT MEDICATIONS  Scheduled Medications:  haloperidol lactate, 5 mg, Once  thiamine, 500 mg, TID  enoxaparin, 40 mg, Daily  atorvastatin, 80 mg, Nightly  aspirin, 81 mg, Daily  methylPREDNISolone, 60 mg, Q12H  azithromycin, 500 mg, Daily  guaiFENesin ER, 600 mg, BID  cefTRIAXone, 1 g, Q24H  clopidogrel, 75 mg, Daily  furosemide, 40 mg, Daily  cetirizine, 10 mg, Daily  pantoprazole, 20 mg, QAM AC  ipratropium-albuterol, 3 mL, QID     Drips:       PRN Medications:   acetaminophen, 650 mg, Q4H PRN   Or  acetaminophen, 650 mg, Q4H PRN  labetalol, 10 mg, Q10 Min PRN  hydrALAzine, 10 mg, Q2H PRN  ondansetron, 4 mg, Q6H PRN  prochlorperazine, 5 mg, Q8H PRN  ipratropium-albuterol, 3 mL, Q6H PRN  influenza virus vaccine PF, 0.5 mL, Prior to discharge       PHYSICAL EXAMINATION  Vitals: /81 (BP Location: Left arm)   Pulse 77   Temp 97.5 °F (36.4 °C) (Temporal)   Resp 10   Ht 5' 5\" (1.651 m)   Wt 163 lb 9.3 oz (74.2 kg)   SpO2 94%   BMI 27.22 kg/m²   Gen: NAD, well nourished, on AVAPs, lethargic  Eyes: PERRLA, normal conjunctivae  ENMT: Dry mucous membranes, trachea midline  CV: RRR, no peripheral edema  Resp: Minimal air movement, non-labored respirations, symmetric expansion  GI: Soft, NT, ND, no rebound, no guarding  MSK:  No C/C/E, normal active/passive ROM in C-spine  Skin: No rashes  Neuro: W/d all 4 extremities from pain  Psych: Somnolent, on AVAPs, not conversational    LABORATORY VALUES   Recent Labs   Lab 03/17/24  1522 03/18/24  0436 03/19/24  0444   * 140  --    K 3.3* 4.4 4.4   CL 90* 94*  --    CO2 >40.0* >40.0*  --    BUN 25* 18  --    CREATSERUM 0.89 0.77  --    * 151*  --    CA 8.3* 8.6*  --    TP 6.6 6.5  --    ALB 3.9 3.7  --    AST 38* 27  --    ALT 14 14  --    ALKPHO 41* 43*  --    BILT <0.2* <0.2*  --     EGFRCR 73 87  --      Recent Labs   Lab 03/17/24  1522 03/18/24  0436   WBC 9.6 7.4   HGB 14.1 13.7   HCT 44.6 44.8   .0 232.0   MCV 91.6 93.5   MOPERCENT 13.5  --    EOPERCENT 0.0  --    BAPERCENT 0.3  --      Recent Labs   Lab 03/17/24  1522 03/17/24  1524   PCT 0.09*  --    COVID19  --  Not Detected   INFAPCR  --  Positive*   INFBPCR  --  Negative   RSV  --  Negative     ASSESSMENT & PLAN  Caron Daniels - 64 year old female with NICM s/p AICD, COPD, daily tobacco use, prior etoh abuse, and HTN who presented to the hospital with multiple complaints including shortness of breath, weakness, slurred speech, difficulty ambulating.     Acute hypoxemic and hypercapnic respiratory failure  - SpO2 59% on RA, pCO2 95 on abg initially, increased to 109  - suspect that she has chronic hypoxemic and hypercapnic respiratory failure in addition to acute insult  - 2/2 due to COPD exacerbation, possible superimposed PNA, will need to r/o PE as well so will get CTA chest  - on 7L NC initially, now on BiPAP  - continuous pulse ox  - supplemental O2 to maintain SpO2 88-92%  - pulm colleagues consulted, appreciate recs     COPD exacerbation  Influenza A infection  Presumed bacterial pneumonia  - CTA chest with no PE  - onset of influenza symptoms 5 days prior, out of window for tamiflu  - IV solumedrol 60 BID  - duonebs q 4 WA and q 6 PRN  - start azithromycin/rocephin emperically  - mucinex 600 BID  - Pulm consutled - IPPV as above     TME  - ddx includes metabolic encephalopathy from hypercapnia, - does have a hx of significant etoh use, wernickes? But states quit > 8 years ago and goes to [facility]., CVA  - TSH and B12 normal, B1 levels pending  - UDS + canabis  - CTH in the ED was negative  - hx of ICD, unsure if compatible with MRI but if able MRI brain  - CTA H/N showed no major occlusion or stenosis  - echo   - s/p ASA and plavix in the ED, start ASA daily  - neuro consulted, appreciate recs  - lipitor 80 mg nightly  -  hba1c of 6.6  - PT/OT/SW  - High dose thiamine  - Convert meds to PO, close monitoring of resp status     HTN  - BP acceptable  - no need for rx now     Hx of ischemic cardiomyopathy s/p AICD  - echo now pending  - holding home HF medications for now     Daily tobacco use  - quit smoking cigarettes in 2000 but smokes 5-6 cigars daily  - did not want nicotine patch    Likely lung CA  - Spiculated 3.8 cm right lower lobe pulmonary mass seen on CTA chest in ED  - Seen by pulm - plan for OP PET     GERD   - Sub PTA PO pantop for IV    Fluids & nutrition  - Now NPO x 48 hrs  - Hold PTA lasix.   - gentle IVF, low rate. Avoid volume overload  - Will need supplemental nutrition pending course     ACP: Full Code  Ppx: DVT: Enox. GI: IV PPI for now  Dispo  EDoD:  ~ 3/25  F/u:   - PCP:  MARLA BARNETT    Available via bubble chat or perfect serve 7A - 7P.    35 minutes of critical care time 03/19/24. Time spent on this encounter was for management of AMS/TME, resp failure. Patient w/ multi-organ dysfunction & high risk for further decline.    Xavier Pérez MD   Internal Medicine - Hospitalist  [facility]

## 2024-03-19 NOTE — PROGRESS NOTES
[facility]  [address] [place] [address], [address]  [facility] [id number]  [phone]            Caron Daniels Patient Status:  Inpatient    1960 MRN [id number]   Location [facility] 2W/SW Attending Xavier Pérez MD   Hosp Day # 2 PCP MARLA BARNETT     Subjective:  Caron Daniels is a(n) 64 year old female.    Hospital course to date:       Patient has a history of AICD, COPD, daily tobacco use, prior history alcohol abuse.  Hypertension.  She presented to the hospital with shortness of breath, overall weakness and slurring of speech, difficulty with ambulating.  Apparently she was very tangential with hospitalist.  According to her  she started having flulike symptoms few days prior to that admission in 2024.  She was developing fevers, shortness of breath, congestion, overall weakness.  Because of this possibility of slurring of speech stroke order set was also placed.  CT of the head and CT angiogram of the head and neck did not show any obvious acute changes.  It was not clear if MRI is compatible with your AICD.    Current Facility-Administered Medications   Medication Dose Route Frequency    haloperidol lactate (Haldol) 5 MG/ML injection 5 mg  5 mg Intramuscular Once    thiamine 100 mg/mL injection 500 mg  500 mg Intravenous TID    enoxaparin (Lovenox) 40 MG/0.4ML SUBQ injection 40 mg  40 mg Subcutaneous Daily    acetaminophen (Tylenol) tab 650 mg  650 mg Oral Q4H PRN    Or    acetaminophen (Tylenol) rectal suppository 650 mg  650 mg Rectal Q4H PRN    labetalol (Trandate) 5 mg/mL injection 10 mg  10 mg Intravenous Q10 Min PRN    hydrALAzine (Apresoline) 20 mg/mL injection 10 mg  10 mg Intravenous Q2H PRN    ondansetron (Zofran) 4 MG/2ML injection 4 mg  4 mg Intravenous Q6H PRN    prochlorperazine (Compazine) 10 MG/2ML injection 5 mg  5 mg Intravenous Q8H PRN    atorvastatin (Lipitor) tab 80 mg  80 mg Oral Nightly    aspirin chewable tab 81 mg  81 mg  Oral Daily    methylPREDNISolone sodium succinate (Solu-MEDROL) injection 60 mg  60 mg Intravenous Q12H    ipratropium-albuterol (Duoneb) 0.5-2.5 (3) MG/3ML inhalation solution 3 mL  3 mL Nebulization Q6H PRN    azithromycin (Zithromax) tab 500 mg  500 mg Oral Daily    guaiFENesin ER (Mucinex) 12 hr tab 600 mg  600 mg Oral BID    cefTRIAXone (Rocephin) 1 g in D5W 100 mL IVPB-ADD  1 g Intravenous Q24H    clopidogrel (Plavix) tab 75 mg  75 mg Oral Daily    furosemide (Lasix) tab 40 mg  40 mg Oral Daily    cetirizine (ZyrTEC) tab 10 mg  10 mg Oral Daily    pantoprazole (Protonix) DR tab 20 mg  20 mg Oral QAM AC    influenza vaccine split quad (Fluzone QIV) 0.5 mL IM injection (ages 6 months to 64 years) 0.5 mL  0.5 mL Intramuscular Prior to discharge    ipratropium-albuterol (Duoneb) 0.5-2.5 (3) MG/3ML inhalation solution 3 mL  3 mL Nebulization QID       Objective:  Blood pressure 121/81, pulse 77, temperature 97.5 °F (36.4 °C), temperature source Temporal, resp. rate 10, height 65\", weight 163 lb 9.3 oz (74.2 kg), SpO2 94%.    Physical Exam:  Vitals:    03/19/24 0400 03/19/24 0600 03/19/24 0700 03/19/24 0725   BP: 129/71 127/65 121/81    Pulse: 70 77 76 77   Resp: 19 13 10    Temp: 97.5 °F (36.4 °C)      TempSrc: Temporal      SpO2: 94% 96% 92% 94%   Weight:       Height:           General: No apparent distress, well nourished, well groomed.  Head- Normocephalic, atraumatic  Eyes- No redness or swelling  Neck- No masses or adenopathy  CV: pulses were palpable and normal, no cyanosis or edema     Neurological:     Mental Status-intermittently wakes up and follows commands occasionally but most of the time is quite sedated.  Did not follow commands on my examination.    Lab Results   Component Value Date    WBC 7.4 03/18/2024    HGB 13.7 03/18/2024    HCT 44.8 03/18/2024    .0 03/18/2024    CREATSERUM 0.77 03/18/2024    BUN 18 03/18/2024     03/18/2024    K 4.4 03/19/2024    CL 94 (L) 03/18/2024    CO2  >40.0 (HH) 03/18/2024     (H) 03/18/2024    CA 8.6 (L) 03/18/2024    ALB 3.7 03/18/2024    ALKPHO 43 (L) 03/18/2024    BILT <0.2 (L) 03/18/2024    TP 6.5 03/18/2024    AST 27 03/18/2024    ALT 14 03/18/2024    TSH 1.046 03/17/2024    DDIMER 0.52 03/13/2017    TROP 0.01 03/13/2017    B12 801 03/17/2024       CTA BRAIN + CTA CAROTIDS (CPT=70496/65096)    Result Date: 3/18/2024  CONCLUSION:  1. No flow limiting stenosis or occlusion of the major cervical/intracranial arteries. 2. Mild calcific atherosclerosis involving the right carotid bifurcation resulting in a less than 50% luminal stenosis. 3. 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 4. Lesser incidental findings as above.   A preliminary report was issued by the [employer] [facility] teleradiology service. There are no major discrepancies.  [facility]  Dictated by (CST): Sarkis Steve MD on 3/18/2024 at 8:37 AM     Finalized by (CST): Sarkis Steve MD on 3/18/2024 at 8:44 AM          CT CHEST PE AORTA (IV ONLY) (CPT=71260)    Result Date: 3/17/2024  CONCLUSION:   No evidence of acute pulmonary embolism to the level of the segmental pulmonary artery branches  Spiculated 3.8 cm right lower lobe pulmonary mass.  This is concerning for a primary lung neoplasia, with infection being considered less likely.  Recommend further assessment with tissue sampling.  Multiple additional pulmonary nodules, which of which are spiculated measuring up to 1.5 cm, concerning for sites sites of pulmonary metastatic disease.  A 2.1 cm ground-glass and reticular opacity of the right lower lobe may be infectious or inflammatory in etiology, with primary lung neoplasia being within the differential.  Further assessment with tissue sampling and/or FDG PET-CT would be helpful.  Bilateral hilar and subcarinal lymph nodes measuring up to 1.1 cm.  These are concerning for waqar metastatic disease.  Reactive etiologies a  differential consideration.      Dictated by (CST): Nicky Rincon MD on 3/17/2024 at 7:25 PM     Finalized by (CST): Nicky Rincon MD on 3/17/2024 at 7:38 PM          CT BRAIN OR HEAD (10367)    Result Date: 3/17/2024  CONCLUSION:   No acute intracranial hemorrhage, hydrocephalus, or mass effect.  If there is clinical concern for acute ischemic stroke, MRI of the brain is recommended.    Dictated by (CST): Nicky Rincon MD on 3/17/2024 at 5:18 PM     Finalized by (CST): Nicky Rincon MD on 3/17/2024 at 5:23 PM          XR CHEST AP PORTABLE  (CPT=71045)    Result Date: 3/17/2024  CONCLUSION:  Pulmonary vascular congestion, without overt pulmonary edema.  Bibasilar airspace opacities, which may be secondary to subsegmental atelectasis, with differential of alveolar edema or infection, in the appropriate clinical setting.    Dictated by (CST): Nicky Rincon MD on 3/17/2024 at 4:17 PM     Finalized by (CST): Nicky Rincon MD on 3/17/2024 at 4:19 PM         EKG 12 Lead    Result Date: 3/18/2024  Sinus rhythm with frequent Premature ventricular complexes Otherwise normal ECG No previous ECGs found in [facility] Confirmed by ASAD FORDE, LAMAR ([id number]) on 3/18/2024 4:37:55 PM       Assessment:  Patient Active Problem List   Diagnosis    Acute bronchitis    Acute bronchitis, unspecified organism    COPD exacerbation (HCC)    Dysarthria    Hypoxia    Hypercapnia    Influenza     Most likely metabolic encephalopathy.  Low likelihood of a stroke.  However will need to reassess her when she is more awake and following commands.    Toro Watts MD  3/19/2024  8:19 AM

## 2024-03-19 NOTE — DIETARY NOTE
ADULT NUTRITION INITIAL ASSESSMENT    Pt is at high nutrition risk.  Pt does not meet malnutrition criteria at this time.   RECOMMENDATIONS TO MD: CPM. Will monitor GOC to drive nutrition plan of care.     ADMITTING DIAGNOSIS:  Hypercapnia [R06.89]  Influenza [J11.1]  Hypoxia [R09.02]  Dysarthria [R47.1]  PERTINENT PAST MEDICAL HISTORY:   Past Medical History:   Diagnosis Date    Cardiomyopathy (HCC)     CHF (congestive heart failure) (HCC)     COPD (chronic obstructive pulmonary disease) (HCC)     Defibrillator discharge     Essential hypertension        PATIENT STATUS: Initial 03/19/24: Pt admit 3/17 for c/o: shortness of breath, weakness, slurred speech, difficulty ambulating. Findings: +Influenza, probable lung mass, PNA. Pt currently on AVAPS PMH sig for nonischemic cardiomyopathy s/p AICD, COPD, daily tobacco use, prior etoh abuse, and HTN .   Pt assessed due to screening at risk for unplanned weight loss and eating poorly as well as EN consult this am. Discussed at care rounds. Pt with decreased mentation and unable to take po intake safely, with acute on chronic respiratory failure on AVAPS. +lethargy, on restraints. Plan is for spouse (who has been ill) to visit tomorrow and meet with palliative care. RD will await POC after this meeting to determine nutrition plan of care and need to obtain diet hx from spouse.  Pt appears well nourished (visual) and WEIGHT hx:   Reflects pt is overweight (BMI 27.22 kg/m2) and there has been weight loss since 2019 but timeframe for loss n/a. DIET hx: obtain tomorrow when spouse here unless significant code status change. Suspect po intake decline for 7 days PTA associated with symptoms starting  7 days PTA.  Discussed potential feeding tube placement but to place this on hold for now in the current scenario. EN order set cancelled.   Pt is at risk for lyte/mineral deficits (Mg,phos, k+) in view of hypokalemia and decreased po intake recently + decreased mentation.  Will  add on phos and mg level for am.   FOOD/NUTRITION RELATED HISTORY:  Appetite: Decreased  Intake:  to obtain hx tomorrow as above (if appropriate)   Intake Meeting Needs: No  Percent Meals Eaten (last 3 days)       Date/Time Percent Meals Eaten (%)    03/17/24 2114 100 %     Percent Meals Eaten (%): after hours food tray at 03/17/24 2114           Food Allergies: No Known Food Allergies (NKFA)  Cultural/Ethnic/Religion Preferences: Not Obtained    GASTROINTESTINAL: +BM 3/19 . 3/19 hold of keofeed placement per MD.     MEDICATIONS: reviewed    sodium chloride 50 mL/hr at 03/19/24 0907        pantoprazole  40 mg Intravenous Daily    azithromycin  500 mg Intravenous Q24H    haloperidol lactate  5 mg Intramuscular Once    thiamine  500 mg Intravenous TID    enoxaparin  40 mg Subcutaneous Daily    atorvastatin  80 mg Oral Nightly    aspirin  81 mg Oral Daily    methylPREDNISolone  60 mg Intravenous Q12H    cefTRIAXone  1 g Intravenous Q24H    clopidogrel  75 mg Oral Daily    [Held by provider] furosemide  40 mg Oral Daily    cetirizine  10 mg Oral Daily    [Held by provider] pantoprazole  20 mg Oral QAM AC    ipratropium-albuterol  3 mL Nebulization QID   Prn: acetaminophen **OR** acetaminophen, labetalol, hydrALAzine, ondansetron, prochlorperazine, ipratropium-albuterol, influenza virus vaccine PF    LABS: reviewed  Recent Labs     03/17/24  1522 03/18/24  0436 03/19/24  0444   * 151*  --    BUN 25* 18  --    CREATSERUM 0.89 0.77  --    CA 8.3* 8.6*  --    * 140  --    K 3.3* 4.4 4.4   CL 90* 94*  --    CO2 >40.0* >40.0*  --    OSMOCALC 286 295  --        NUTRITION RELATED PHYSICAL FINDINGS:  - Nutrition Focused Physical Exam (NFPE): well nourished per visual exam  - Fluid Accumulation: none  see RN documentation for details  - Skin Integrity: intact see RN documentation for details    ANTHROPOMETRICS:  HT: 165.1 cm (5' 5\")  WT: 74.2 kg (163 lb 9.3 oz)   BMI: Body mass index is 27.22 kg/m².  BMI  CLASSIFICATION: 25-29.9 kg/m2 - overweight  IBW: 125 lbs        130 % IBW  Usual Body Wt: n/a last weight in system 2019 203  lbs     80 % UBW 5 years ago.     WEIGHT HISTORY:  Patient Weight(s) for the past 336 hrs:   Weight   03/19/24 0000 74.2 kg (163 lb 9.3 oz)   03/18/24 0300 80 kg (176 lb 5.9 oz)   03/17/24 2015 78.6 kg (173 lb 3.2 oz)   03/17/24 1535 80.7 kg (178 lb)     Wt Readings from Last 10 Encounters:   03/19/24 74.2 kg (163 lb 9.3 oz)   03/31/19 92.1 kg (203 lb)   03/16/17 95.9 kg (211 lb 7 oz)     NUTRITION DIAGNOSIS/PROBLEM:   Inadequate oral intake related to Decreased ability to consume sufficient energy d/t confusion and respiratory failure as evidenced by NPO, 0 nutrition intkae.     NUTRITION INTERVENTION:     NUTRITION PRESCRIPTION:   Estimated Nutrition needs: --dosing wt of 74 kg - wt taken on 3/19   Calories: 7646-9696 calories/day (20-25 calories per kg Dosing wt)  Protein:   g protein/day (1.2-1.5  g protein/kg Dosing wt)  Fluid Needs: ~30 ml/kg maintenance: 2220 ml.     - Diet:       Procedures    NPO   - Medical Food Supplements-NPO  - Vitamin and mineral supplements: thiamin 100 mg TID  - Feeding assistance: NPO  - Nutrition education: not appropriate at this time   - Coordination of nutrition care: collaboration with other providers and discussed in Care Rounds   - Discharge and transfer of nutrition care to new setting or provider: monitor plans    MONITOR AND EVALUATE/NUTRITION GOALS:  - Food and Nutrient Intake:      Monitor: for PO initiation  - Food and Nutrient Administration:      Monitor: enteral nutrition initiation if appropriate.   - Anthropometric Measurement:    Monitor weight  - Nutrition Goals:      labs within acceptable limits, euglycemia, and adequate hydration. Future feeding plans based on GOC. Resume po intake when deemed safe.     DIETITIAN FOLLOW UP: RD to follow and monitor nutrition status    Swati Connelly RD, LDN, [facility] ([id number])

## 2024-03-19 NOTE — CM/SW NOTE
Palliative to arrange a family meeting on 3/10/24.  Caron was working FT and did not have insurance with her previous job.      BULL spoke again with Joni form [employer].  Caron probably will not qualify for medicaid due to being over income.  BULL mentioned to DIL that a [employer] plan may also be an option.    BULL is working with [employer] to see if Caron may qualify for medicaid based on disability.  BULL will follow-up with Joni to see if this may be a possibility    / to remain available for support and/or discharge planning.      Rabia Dhaliwal MBA BSN RN CRRN   RN Case Manager  [phone] .

## 2024-03-19 NOTE — PROGRESS NOTES
Critical Care Progress Note     Assessment / Plan:  Acute on chronic respiratory failure - due to AECOPD, influenza and encephalopathy   - AVAPS  - repeat ABG pending  AECOPD  - IV steroids  - scheduled bronchodilators  PNA - RPP positive for influenza  - out of the window for Tamiflu  - empiric ceftriaxone and azithromycin for now  Abnormal CT chest - likely has lung cancer  - outpatient PET-CT  Tobacco use  - smoking cessation counseling once appropriate  Encephalopathy - due to TME  - supportive care  Dysarthria - CT brain with no acute process  - neurology following  NICM  - TTE this admission with normal EF  FEN  - NPO  Ppx  - Lovenox  Dispo  - full code; confirmed with   - ICU    Critical care time: 35 minutes      Subjective:  More alert last night, but now somnolent again    Objective:  Vitals:    03/19/24 0400 03/19/24 0600 03/19/24 0700 03/19/24 0725   BP: 129/71 127/65 121/81    BP Location: Left arm Left arm Left arm    Pulse: 70 77 76 77   Resp: 19 13 10    Temp: 97.5 °F (36.4 °C)      TempSrc: Temporal      SpO2: 94% 96% 92% 94%   Weight:       Height:         Physical Exam:  General: on AVAPS  Skin: no rash, ulcers or subcutaneous nodules  Eyes: anicteric sclerae, moist conjunctivae  Head, ears, nose, throat: atraumatic, oropharynx clear with moist mucous membranes  Neck: trachea midline with no thyromegaly  Heart: regular rate and rhythm, no murmurs / rubs / gallops  Lungs: clear bilaterally, normal respiratory effort, no accessory muscle use  Abdomen: soft, nontender, nondistended   Extremities: no edema or cyanosis  Psych: opens eyes to voice    Medications:  Reviewed in EMR    Lab Data:  Reviewed in EMR    Imaging:  I independently visualized all relevant chest imaging in PACS and agree with radiology interpretation except where noted.

## 2024-03-19 NOTE — PROGRESS NOTES
Critical Care    Repeat ABG on AVAPS this evening below.  She is much more alert this evening. I messaged Dr. Estrada and spoke to Dr. Rhonda lopez.  Will continue same AVAPS.  I spoke to Nya GARZA as well.    Recent Labs   Lab 03/18/24  1856   ABGPHT 7.32*   [id number] 113*   MYFXA2F 72*   ABGHCO3 44.2*   SITE Right Radial     Meredith Maciel NP  Critical Care

## 2024-03-20 LAB
ALBUMIN SERPL-MCNC: 3.8 G/DL (ref 3.2–4.8)
ALBUMIN/GLOB SERPL: 1.5 {RATIO} (ref 1–2)
ALP LIVER SERPL-CCNC: 39 U/L
ALT SERPL-CCNC: 8 U/L
AST SERPL-CCNC: 14 U/L (ref ?–34)
BILIRUB SERPL-MCNC: 0.2 MG/DL (ref 0.2–1.1)
BUN BLD-MCNC: 16 MG/DL (ref 9–23)
BUN BLD-MCNC: 18 MG/DL (ref 9–23)
BUN/CREAT SERPL: 32 (ref 10–20)
BUN/CREAT SERPL: 32.1 (ref 10–20)
CALCIUM BLD-MCNC: 9 MG/DL (ref 8.7–10.4)
CALCIUM BLD-MCNC: 9.4 MG/DL (ref 8.7–10.4)
CHLORIDE SERPL-SCNC: 101 MMOL/L (ref 98–112)
CHLORIDE SERPL-SCNC: 103 MMOL/L (ref 98–112)
CO2 SERPL-SCNC: >40 MMOL/L (ref 21–32)
CO2 SERPL-SCNC: >40 MMOL/L (ref 21–32)
CREAT BLD-MCNC: 0.5 MG/DL
CREAT BLD-MCNC: 0.56 MG/DL
DEPRECATED RDW RBC AUTO: 47.6 FL (ref 35.1–46.3)
EGFRCR SERPLBLD CKD-EPI 2021: 102 ML/MIN/1.73M2 (ref 60–?)
EGFRCR SERPLBLD CKD-EPI 2021: 105 ML/MIN/1.73M2 (ref 60–?)
ERYTHROCYTE [DISTWIDTH] IN BLOOD BY AUTOMATED COUNT: 13.2 % (ref 11–15)
GLOBULIN PLAS-MCNC: 2.5 G/DL (ref 2.8–4.4)
GLUCOSE BLD-MCNC: 109 MG/DL (ref 70–99)
GLUCOSE BLD-MCNC: 127 MG/DL (ref 70–99)
GLUCOSE BLDC GLUCOMTR-MCNC: 121 MG/DL (ref 70–99)
GLUCOSE BLDC GLUCOMTR-MCNC: 122 MG/DL (ref 70–99)
HCT VFR BLD AUTO: 43.2 %
HGB BLD-MCNC: 12.7 G/DL
MAGNESIUM SERPL-MCNC: 2.4 MG/DL (ref 1.6–2.6)
MCH RBC QN AUTO: 29 PG (ref 26–34)
MCHC RBC AUTO-ENTMCNC: 29.4 G/DL (ref 31–37)
MCV RBC AUTO: 98.6 FL
OSMOLALITY SERPL CALC.SUM OF ELEC: 308 MOSM/KG (ref 275–295)
OSMOLALITY SERPL CALC.SUM OF ELEC: 311 MOSM/KG (ref 275–295)
PHOSPHATE SERPL-MCNC: 1.5 MG/DL (ref 2.4–5.1)
PLATELET # BLD AUTO: 250 10(3)UL (ref 150–450)
POTASSIUM SERPL-SCNC: 4.1 MMOL/L (ref 3.5–5.1)
POTASSIUM SERPL-SCNC: 4.4 MMOL/L (ref 3.5–5.1)
PROT SERPL-MCNC: 6.3 G/DL (ref 5.7–8.2)
RBC # BLD AUTO: 4.38 X10(6)UL
SODIUM SERPL-SCNC: 148 MMOL/L (ref 136–145)
SODIUM SERPL-SCNC: 149 MMOL/L (ref 136–145)
VITAMIN B1 WHOLE BLD: 105.7 NMOL/L
WBC # BLD AUTO: 13 X10(3) UL (ref 4–11)

## 2024-03-20 PROCEDURE — 99233 SBSQ HOSP IP/OBS HIGH 50: CPT | Performed by: REGISTERED NURSE

## 2024-03-20 PROCEDURE — 99232 SBSQ HOSP IP/OBS MODERATE 35: CPT | Performed by: OTHER

## 2024-03-20 RX ORDER — DEXTROSE MONOHYDRATE 50 MG/ML
INJECTION, SOLUTION INTRAVENOUS CONTINUOUS
Status: DISCONTINUED | OUTPATIENT
Start: 2024-03-20 | End: 2024-03-21

## 2024-03-20 NOTE — PALLIATIVE CARE NOTE
[facility]  part of [facility]  Palliative Care Progress Note    Caron Daniels Patient Status:  Inpatient    1960 MRN [id number]   Location [facility] 2W/SW Attending Xavier Pérez MD   Hosp Day # 3 PCP MARLA BARNETT     The  Cures Act makes medical notes like these available to patients in the interest of transparency. Please be advised this is a medical document. Medical documents are intended to carry relevant information, facts as evident, and the clinical opinion of the practitioner. The medical note is intended as peer to peer communication and may appear blunt or direct. It is written in medical language and may contain abbreviations or verbiage that are unfamiliar.       Subjective     Reviewed symptom medications past 24 hrs: started on precedex gtt overnight for agitation, Haldol 2 mg IVP X1    Patient was seen and examined in bed with her family at the bedside. Pt is sedated on precedex gtt on AVAPS 60%. She is nodding her head yes to feeling SOB. Denies any pain symptoms. Remains NPO and moved her bowels yesterday. VSS    See summary of discussion below.     Review of Systems:  Unable to obtain ROS due to pt factors above    Allergies:  No Known Allergies    Medications:     Current Facility-Administered Medications:     sodium phosphate 30 mmol in sodium chloride 0.9% 150 mL IVPB, 30 mmol, Intravenous, Once    dextrose 5% infusion, , Intravenous, Continuous    pantoprazole (Protonix) 40 mg in sodium chloride 0.9% PF 10 mL IV push, 40 mg, Intravenous, Daily    azithromycin (Zithromax) 500 mg in sodium chloride 0.9% 250mL IVPB premix, 500 mg, Intravenous, Q24H    methylPREDNISolone sodium succinate (Solu-MEDROL) injection 40 mg, 40 mg, Intravenous, Daily    haloperidol lactate (Haldol) 5 MG/ML injection 2 mg, 2 mg, Intravenous, Q6H PRN    dexmedeTOMIDine in sodium chloride 0.9% (Precedex) 400 mcg/100mL infusion premix, 0.2-1.5 mcg/kg/hr (Dosing Weight),  Intravenous, Continuous    thiamine 100 mg/mL injection 500 mg, 500 mg, Intravenous, TID    enoxaparin (Lovenox) 40 MG/0.4ML SUBQ injection 40 mg, 40 mg, Subcutaneous, Daily    acetaminophen (Tylenol) tab 650 mg, 650 mg, Oral, Q4H PRN **OR** acetaminophen (Tylenol) rectal suppository 650 mg, 650 mg, Rectal, Q4H PRN    labetalol (Trandate) 5 mg/mL injection 10 mg, 10 mg, Intravenous, Q10 Min PRN    hydrALAzine (Apresoline) 20 mg/mL injection 10 mg, 10 mg, Intravenous, Q2H PRN    ondansetron (Zofran) 4 MG/2ML injection 4 mg, 4 mg, Intravenous, Q6H PRN    prochlorperazine (Compazine) 10 MG/2ML injection 5 mg, 5 mg, Intravenous, Q8H PRN    atorvastatin (Lipitor) tab 80 mg, 80 mg, Oral, Nightly    aspirin chewable tab 81 mg, 81 mg, Oral, Daily    ipratropium-albuterol (Duoneb) 0.5-2.5 (3) MG/3ML inhalation solution 3 mL, 3 mL, Nebulization, Q6H PRN    cefTRIAXone (Rocephin) 1 g in D5W 100 mL IVPB-ADD, 1 g, Intravenous, Q24H    clopidogrel (Plavix) tab 75 mg, 75 mg, Oral, Daily    [Held by provider] furosemide (Lasix) tab 40 mg, 40 mg, Oral, Daily    cetirizine (ZyrTEC) tab 10 mg, 10 mg, Oral, Daily    [Held by provider] pantoprazole (Protonix) DR tab 20 mg, 20 mg, Oral, QAM AC    influenza vaccine split quad (Fluzone QIV) 0.5 mL IM injection (ages 6 months to 64 years) 0.5 mL, 0.5 mL, Intramuscular, Prior to discharge    ipratropium-albuterol (Duoneb) 0.5-2.5 (3) MG/3ML inhalation solution 3 mL, 3 mL, Nebulization, QID    Objective     Vital Signs:  Blood pressure 139/83, pulse 78, temperature 97.2 °F (36.2 °C), temperature source Temporal, resp. rate 19, height 5' 5\" (1.651 m), weight 163 lb 9.3 oz (74.2 kg), SpO2 99%.  Body mass index is 27.22 kg/m².  Present Level of pain: RENÉE  Non-verbal signs of pain present: No    Physical Exam:  General: Lethargic and in no apparent distress. Acutely-ill appearing  HEENT: No focal deficits. +dry mucous membranes, +bipap mask  Cardiac: Regular rate and rhythm, S1, S2 normal, no  murmur, rub or gallop.  Lungs: Diminished breath sounds bilaterally.  Normal excursions and effort.  Abdomen: Soft, non-tender, normal bowel sounds X 4 quadrants, no rebound or guarding  Extremities: Without clubbing, cyanosis. Peripheral pulses are 2+. BLE Edema not present  Neurologic: Lethargic  Skin: Warm and dry.    Prior to admission Palliative performance scale PPSv2 (%): 90    PPS 10%    Hematology:  Lab Results   Component Value Date    WBC 13.0 (H) 03/20/2024    HGB 12.7 03/20/2024    HCT 43.2 03/20/2024    .0 03/20/2024       Coags:No results found for: \"PT\", \"INR\", \"PTT\"    Chemistry:  Lab Results   Component Value Date    CREATSERUM 0.56 03/20/2024    BUN 18 03/20/2024     (H) 03/20/2024    K 4.1 03/20/2024     03/20/2024    CO2 >40.0 (HH) 03/20/2024     (H) 03/20/2024    CA 9.4 03/20/2024    ALB 3.8 03/20/2024    ALKPHO 39 (L) 03/20/2024    BILT 0.2 03/20/2024    TP 6.3 03/20/2024    AST 14 03/20/2024    ALT 8 (L) 03/20/2024    DDIMER 0.52 03/13/2017    MG 2.4 03/20/2024    PHOS 1.5 (L) 03/20/2024    TROP 0.01 03/13/2017       Imaging:  CT BRAIN OR HEAD ([id number])    Result Date: 3/19/2024  CONCLUSION:  1. No acute intracranial process by noncontrast CT technique. 2. Intracranial atherosclerosis. 3. Dependent left greater than right maxillary sinus mucosal thickening. 4. Lesser incidental findings as above.   elm-remote  Dictated by (CST): Neftali El MD on 3/19/2024 at 11:45 AM     Finalized by (CST): Neftali El MD on 3/19/2024 at 11:47 AM          CTA BRAIN + CTA CAROTIDS (CPT=70496/51559)    Result Date: 3/18/2024  CONCLUSION:  1. No flow limiting stenosis or occlusion of the major cervical/intracranial arteries. 2. Mild calcific atherosclerosis involving the right carotid bifurcation resulting in a less than 50% luminal stenosis. 3. 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 4. Lesser incidental findings as  above.   A preliminary report was issued by the [facility] teleradiology service. There are no major discrepancies.  Elm-remote  Dictated by (CST): Sarkis Steve MD on 3/18/2024 at 8:37 AM     Finalized by (CST): Sarkis Steve MD on 3/18/2024 at 8:44 AM          CT CHEST PE AORTA (IV ONLY) (CPT=71260)    Result Date: 3/17/2024  CONCLUSION:   No evidence of acute pulmonary embolism to the level of the segmental pulmonary artery branches  Spiculated 3.8 cm right lower lobe pulmonary mass.  This is concerning for a primary lung neoplasia, with infection being considered less likely.  Recommend further assessment with tissue sampling.  Multiple additional pulmonary nodules, which of which are spiculated measuring up to 1.5 cm, concerning for sites sites of pulmonary metastatic disease.  A 2.1 cm ground-glass and reticular opacity of the right lower lobe may be infectious or inflammatory in etiology, with primary lung neoplasia being within the differential.  Further assessment with tissue sampling and/or FDG PET-CT would be helpful.  Bilateral hilar and subcarinal lymph nodes measuring up to 1.1 cm.  These are concerning for waqar metastatic disease.  Reactive etiologies a differential consideration.      Dictated by (CST): Nicky Rincon MD on 3/17/2024 at 7:25 PM     Finalized by (CST): Nicky Rincon MD on 3/17/2024 at 7:38 PM          CT BRAIN OR HEAD ([id number])    Result Date: 3/17/2024  CONCLUSION:   No acute intracranial hemorrhage, hydrocephalus, or mass effect.  If there is clinical concern for acute ischemic stroke, MRI of the brain is recommended.    Dictated by (CST): Nicky Rincon MD on 3/17/2024 at 5:18 PM     Finalized by (CST): Nicky Rincon MD on 3/17/2024 at 5:23 PM          XR CHEST AP PORTABLE  (CPT=71045)    Result Date: 3/17/2024  CONCLUSION:  Pulmonary vascular congestion, without overt pulmonary edema.  Bibasilar airspace opacities, which may be secondary to subsegmental  atelectasis, with differential of alveolar edema or infection, in the appropriate clinical setting.    Dictated by (CST): Nicky Rincon MD on 3/17/2024 at 4:17 PM     Finalized by (CST): Nicky Rincon MD on 3/17/2024 at 4:19 PM           Follow up GOC Discussion      3/20/24-I met with pt's family including  Tico, DEDE Mullins, LUISA Mora and pt's son Gee (on speaker phone) in conference room. I provided overview of palliative care. I provided clinical overview and reinforced her overall guarded prognosis. Discussed ongoing issues with resp failure, presumed lung cancer, encephalopathy, COPD and flu/PNA. Family remains hopeful pt will show signs of improvement with ongoing supportive care.     I readdressed pt's code status and discussed life sustaining treatments in her clinical condition. I talked with family about pt's previously stated wishes for DNAR/DNI and  was tearful but tells me wants to respect her wishes. Family is agreeable to DNAR/DNI-selective tx, ok for temporary feeding tube and continue supportive care. Deferred POLST form completion today and discussed importance of completion prior to dc once all decisions are made.    Discussed her ongoing respiratory failure and  tells me he doesn't want her to suffer. I provided education on option for comfort care if she is not improving. Ongoing GOC discussions will be needed through clinical course. Family asked for  support. Answered questions and provided emotional support.     Discussed with Patient's family: Yes  Patient's preference about sharing medical information: speak to pt's family  Patient's decision making preferences: speak to pt's  Gene  Code status: DNAR/DNI-selective  Have advanced directives been discussed with patient or healthcare power of : Yes        Healthcare Agent Appointed: Yes  Healthcare Agent's Name: Gene  Healthcare Agent's Phone Number: [phone]          Spiritual needs  addressed: Patient/family declined Spiritual Care    Palliative disposition: Ongoing discussions    Procedures:  No intubation  Ok for temporary feeding tube  Palliative Care Assessment and Recommendations     Problem List:     Acute hypoxemic and hypercapnic respiratory failure  COPD exacerbation  Influenza A infection  Presumed bacterial pneumonia  Encephalopathy  HTN  CHF s/p AICD  Abnormal CT-spiculated RLL pulm mass concerning for lung ca  Tobacco abuse  GERD     Goals of care counseling  -see above for details  -Confirmed wishes for DNAR/DNI-selective, ok for temporary feeding tube and continue supportive care.   -Ongoing GOC discussions will be needed through clinical course.  -Family is aware of the option for comfort care if her condition is not improving.  - referral for spiritual support.   -Agreeable to palliative care following  -Dispo:   TBD pending clinical course. SW to help with dc planning.   -Provided emotional support to pt's family who are  coping adequately.     Advance care planning  -see above for details  -Pt's  Tico Daniels is HC surrogate #[phone].  -Provided [place] of [place] for [place] and will complete closer to discharge once all decisions are made.     Palliative Performance Scale 10%     Emotion support provided to patient/family today: Yes    A total of 55 mins were spent on this consult, which included all of the following: direct face to face contact, history taking, physical examination, and >50% was spent counseling and coordinating care.    Discussed today's visit with message to Dr. Pérez, Berenice GARZA, Dr. Cassidy and Chaplain Dahl.    I will continue to follow clinically.    Kathryn Sheehan, ANP-BC, [facility] [id number]  3/20/2024  12:22 PM  Palliative Care Services

## 2024-03-20 NOTE — PROGRESS NOTES
Caron Daniesl Patient Status:  Inpatient    1960 MRN [id number]   Location [facility] 2W/SW Attending Xavier Pérez MD   Hosp Day # 3 PCP MARLA BARNETT     Critical Care Progress Note      Assessment/Plan:  Acute on chronic respiratory failure - due to AECOPD, influenza and encephalopathy   - AVAPS  - ABG reviewed; still w/ compensated hypercapnea but improved  AECOPD  - IV steroids  - scheduled bronchodilators  PNA - RPP positive for influenza  - out of the window for Tamiflu  - empiric ceftriaxone and azithromycin for now  Abnormal CT chest - likely has lung cancer  - outpatient PET-CT  Tobacco use  - smoking cessation counseling once appropriate  Encephalopathy - due to TME  - supportive care  Dysarthria - CT brain with no acute process  - neurology following  NICM  - TTE this admission with normal EF  FEN  - NPO  Ppx  - Lovenox  Dispo  - full code; confirmed with   - ICU    Critical Care time: 37 minutes    Reinier Meyers DO  [facility]  Pulmonary & Critical Care Medicine      Subjective:  Confused and dysarthric this morning, remains on AVAPS    Objective:    Medications:   sodium phosphate  30 mmol Intravenous Once    pantoprazole  40 mg Intravenous Daily    azithromycin  500 mg Intravenous Q24H    methylPREDNISolone  40 mg Intravenous Daily    thiamine  500 mg Intravenous TID    enoxaparin  40 mg Subcutaneous Daily    atorvastatin  80 mg Oral Nightly    aspirin  81 mg Oral Daily    cefTRIAXone  1 g Intravenous Q24H    clopidogrel  75 mg Oral Daily    [Held by provider] furosemide  40 mg Oral Daily    cetirizine  10 mg Oral Daily    [Held by provider] pantoprazole  20 mg Oral QAM AC    ipratropium-albuterol  3 mL Nebulization QID       FiO2 (%):  [50 %-60 %] 50 %    Intake/Output Summary (Last 24 hours) at 3/20/2024 0752  Last data filed at 3/20/2024 0400  Gross per 24 hour   Intake 19.8 ml   Output 525 ml   Net -505.2 ml       /83 (BP Location: Right arm)   Pulse  78   Temp 97.2 °F (36.2 °C) (Temporal)   Resp 19   Ht 5' 5\" (1.651 m)   Wt 163 lb 9.3 oz (74.2 kg)   SpO2 99%   BMI 27.22 kg/m²   Physical Exam:   General: AVAPS, confused   HEENT: lips, mucosa, tongue normal. Mallampati 4   Lungs: clear b/l   Chest wall: No tenderness or deformity.   Heart: Regular rate and rhythm, normal S1S2, no murmur.   Abdomen: soft, non-tender, non-distended, positive BS.   Extremity: No clubbing or cyanosis no edema.   Skin: No rashes or lesions.    Recent Labs   Lab 03/17/24  1522 03/18/24  0436 03/20/24  0433   RBC 4.87   < > 4.38   HGB 14.1   < > 12.7   HCT 44.6   < > 43.2   MCV 91.6   < > 98.6   MCH 29.0   < > 29.0   MCHC 31.6   < > 29.4*   RDW 13.0   < > 13.2   NEPRELIM 7.33  --   --    WBC 9.6   < > 13.0*   .0   < > 250.0    < > = values in this interval not displayed.     Recent Labs   Lab 03/17/24  1522 03/18/24  0436 03/19/24  0444 03/20/24  0433   * 151*  --  109*   BUN 25* 18  --  18   CREATSERUM 0.89 0.77  --  0.56   CA 8.3* 8.6*  --  9.4   ALB 3.9 3.7  --  3.8   * 140  --  148*   K 3.3* 4.4 4.4 4.1   CL 90* 94*  --  101   CO2 >40.0* >40.0*  --  >40.0*   ALKPHO 41* 43*  --  39*   AST 38* 27  --  14   ALT 14 14  --  8*   BILT <0.2* <0.2*  --  0.2   TP 6.6 6.5  --  6.3     Recent Labs   Lab 03/19/24  0920   ABGPHT 7.40   KNRMFB2B 94*   JOFSF4A 76*   ABGHCO3 45.7*   SITE Right Radial     No results for input(s): \"BNP\" in the last 168 hours.  No results for input(s): \"TROP\", \"CK\" in the last 168 hours.    Imaging: I independently visualized all relevant chest imaging in PACS, agree with radiology interpretation except where noted.

## 2024-03-20 NOTE — CM/SW NOTE
Pt remains on continuous BIPAP/AVAPS. Palliative APN met with patient's family earlier today. No new orders at this time. Pt is self pay. CM LVM for Joni at [facility] regarding Medicaid eligibility. Pt has not been appropriate for PT/OT evals.    Plan: TBD    TODD Hines    [phone]

## 2024-03-20 NOTE — OCCUPATIONAL THERAPY NOTE
Chart reviewed, consulted with nurse. Pt not appropriate to participate in skilled therapy at this time -remains on AVAPs, in restraint with haldol provided for agitation overnight. Goals of care meeting taking place today. This is the 3rd day pt not able to participate in therapy and therefore orders will be completed at this time. Nurse agrees to obtain new therapy orders if/when appropriate.

## 2024-03-20 NOTE — PROGRESS NOTES
03/20/24 0843   VISIT TYPE   SLP Inpatient Visit Type (Documentation Required) Attempted Evaluation   FOLLOW UP/PLAN   Follow Up Needed (Documentation Required) On hold     SLP attempt this AM. Pt remains on continuous AVAPS and not appropriate for evaluation. SLP to place pt ON HOLD at this time secondary to 3x consecutive attempts.    Thank you.    Agnes Arenas M.S. CCC-SLP  Speech Language Pathologist  Phone Number Ext. [phone]

## 2024-03-20 NOTE — RESPIRATORY THERAPY NOTE
Patient received on BiPAP with AVAPS. Rate 22/Vt 550/Max 36/Min 15/EPAP 8/FiO2 50%. On continuous BiPAP during this shift; receiving DuoNeb QID.

## 2024-03-20 NOTE — PROGRESS NOTES
[facility]   INTERNAL MEDICINE PROGRESS NOTE    CHIEF COMPLAINT   Difficulty Breathing    SUBJECTIVE  24 HR EVENTS   Agitated overnight, pulling on equipment - on precedex & given haldol  On AVAPS x ~ 72 hrs.  Lethargic    INPATIENT MEDICATIONS  Scheduled Medications:  sodium phosphate, 30 mmol, Once  pantoprazole, 40 mg, Daily  azithromycin, 500 mg, Q24H  methylPREDNISolone, 40 mg, Daily  thiamine, 500 mg, TID  enoxaparin, 40 mg, Daily  atorvastatin, 80 mg, Nightly  aspirin, 81 mg, Daily  cefTRIAXone, 1 g, Q24H  clopidogrel, 75 mg, Daily  [Held by provider] furosemide, 40 mg, Daily  cetirizine, 10 mg, Daily  [Held by provider] pantoprazole, 20 mg, QAM AC  ipratropium-albuterol, 3 mL, QID     Drips:   sodium chloride, Last Rate: 50 mL/hr at 03/19/24 0907  dexmedetomidine, Last Rate: 0.2 mcg/kg/hr (03/20/24 0152)       PRN Medications:   haloperidol lactate, 2 mg, Q6H PRN  acetaminophen, 650 mg, Q4H PRN   Or  acetaminophen, 650 mg, Q4H PRN  labetalol, 10 mg, Q10 Min PRN  hydrALAzine, 10 mg, Q2H PRN  ondansetron, 4 mg, Q6H PRN  prochlorperazine, 5 mg, Q8H PRN  ipratropium-albuterol, 3 mL, Q6H PRN  influenza virus vaccine PF, 0.5 mL, Prior to discharge       PHYSICAL EXAMINATION  Vitals: /83 (BP Location: Right arm)   Pulse 78   Temp 97.2 °F (36.2 °C) (Temporal)   Resp 19   Ht 5' 5\" (1.651 m)   Wt 163 lb 9.3 oz (74.2 kg)   SpO2 99%   BMI 27.22 kg/m²   Gen: Calm, mild agitation when woken, well nourished, on AVAPs  Eyes: PERRLA, normal conjunctivae  ENMT: Dry mucous membranes, trachea midline  CV: RRR, no peripheral edema  Resp: Minimal air movement, non-labored respirations, symmetric expansion  GI: Soft, NT, ND, no rebound, no guarding  MSK:  No C/C/E, normal active/passive ROM in C-spine  Skin: No rashes  Neuro: Follows commands, moving all 4 extrem to command  Psych: Lethargic, does wake up to verbal stimuli, somewhat conversational - difficult to understand while on AVAPs    LABORATORY  VALUES   Recent Labs   Lab 03/17/24  1522 03/18/24  0436 03/19/24  0444 03/20/24  0433   * 140  --  148*   K 3.3* 4.4 4.4 4.1   CL 90* 94*  --  101   CO2 >40.0* >40.0*  --  >40.0*   BUN 25* 18  --  18   CREATSERUM 0.89 0.77  --  0.56   * 151*  --  109*   CA 8.3* 8.6*  --  9.4   PHOS  --   --   --  1.5*   TP 6.6 6.5  --  6.3   ALB 3.9 3.7  --  3.8   AST 38* 27  --  14   ALT 14 14  --  8*   ALKPHO 41* 43*  --  39*   BILT <0.2* <0.2*  --  0.2   EGFRCR 73 87  --  102     Recent Labs   Lab 03/17/24  1522 03/18/24  0436 03/20/24  0433   WBC 9.6 7.4 13.0*   HGB 14.1 13.7 12.7   HCT 44.6 44.8 43.2   .0 232.0 250.0   MCV 91.6 93.5 98.6   MOPERCENT 13.5  --   --    EOPERCENT 0.0  --   --    BAPERCENT 0.3  --   --      Recent Labs   Lab 03/17/24  1522 03/17/24  1524   PCT 0.09*  --    COVID19  --  Not Detected   INFAPCR  --  Positive*   INFBPCR  --  Negative   RSV  --  Negative     ASSESSMENT & PLAN  Caron Daniels - 64 year old female with NICM s/p AICD, COPD, daily tobacco use, prior etoh abuse, and HTN who presented to the hospital with multiple complaints including shortness of breath, weakness, slurred speech, difficulty ambulating.     Acute hypoxemic and hypercapnic respiratory failure  - SpO2 59% on RA, pCO2 95 on abg initially, increased to 109  - suspect that she has chronic hypoxemic and hypercapnic respiratory failure in addition to acute insult  - 2/2 due to COPD exacerbation, possible superimposed PNA, will need to r/o PE as well so will get CTA chest  - on 7L NC initially, now on BiPAP  - continuous pulse ox  - supplemental O2 to maintain SpO2 88-92%  - pulm colleagues consulted, appreciate recs     COPD exacerbation  Influenza A infection  Presumed bacterial pneumonia  - CTA chest with no PE  - onset of influenza symptoms 5 days prior, out of window for tamiflu  - IV solumedrol 60 BID  - duonebs q 4 WA and q 6 PRN  - start azithromycin/rocephin emperically  - mucinex 600 BID  - Pulm  consutled - IPPV as above     TME  - ddx includes metabolic encephalopathy from hypercapnia, - does have a hx of significant etoh use, wernickes? But states quit > 8 years ago and goes to [facility]., CVA  - TSH and B12 normal, B1 levels pending  - UDS + canabis  - CTH in the ED was negative  - hx of ICD, unsure if compatible with MRI but if able MRI brain  - CTA H/N showed no major occlusion or stenosis  - echo   - s/p ASA and plavix in the ED, start ASA daily  - neuro consulted, appreciate recs  - lipitor 80 mg nightly  - hba1c of 6.6  - PT/OT/SW  - High dose thiamine  - Convert meds to PO, close monitoring of resp status  - 3/20: overnight started on precedex, s/p haldol for delirium     Hypernatremia  - Likely 2/2 dehydration w/o oral intake  - switch IVF to D5  [  ] BMP 3/20 PM    HTN  - BP acceptable  - no need for rx now     Hx of ischemic cardiomyopathy s/p AICD  - echo now pending  - holding home HF medications for now     Daily tobacco use  - quit smoking cigarettes in 2000 but smokes 5-6 cigars daily  - did not want nicotine patch    Likely lung CA  - Spiculated 3.8 cm right lower lobe pulmonary mass seen on CTA chest in ED  - Seen by pulm - plan for OP PET     GERD   - Sub PTA PO pantop for IV    Fluids & nutrition  - Now NPO x 48 hrs  - Hold PTA lasix.   - gentle IVF, low rate. Avoid volume overload  - Will need supplemental nutrition pending course     ACP: Full Code  Ppx: DVT: Enox. GI: IV PPI for now  Dispo  EDoD:  ~ 3/25  F/u:   - PCP:  MARLA BARNETT    Available via bubble chat or perfect serve 7A - 7P.    Xavier Pérez MD   Internal Medicine - Hospitalist  [facility]

## 2024-03-20 NOTE — PLAN OF CARE
Pt intermittently following commands. Through the night progressively more restless and agitated, pulling on restraints, IV, Bi-pap. Contacted attending Dr. Marce araya, per MD give Haldol. No improvement with haldol. Contacted pulmonology, started on precedex gtt per Cj Galeas MD. Jackman placed per Marce Araya MD. Bilateral wrist restraints in place. ROM performed Q 2 hours. Son at bedside and updated on plan of care. Bed locked and in lowest position. Call light within reach. Frequent nursing rounding done. All safety measures maintained.     Problem: Safety Risk - Non-Violent Restraints  Goal: Patient will remain free from self-harm  Description: INTERVENTIONS:  - Apply the least restrictive restraint to prevent harm  - Notify patient and family of reasons restraints applied  - Assess for any contributing factors to confusion (electrolyte disturbances, delirium, medications)  - Discontinue any unnecessary medical devices as soon as possible  - Assess the patient's physical comfort, circulation, skin condition, hydration, nutrition and elimination needs   - Reorient and redirection as needed  - Assess for the need to continue restraints  Outcome: Progressing     Problem: RESPIRATORY - ADULT  Goal: Achieves optimal ventilation and oxygenation  Description: INTERVENTIONS:  - Assess for changes in respiratory status  - Assess for changes in mentation and behavior  - Position to facilitate oxygenation and minimize respiratory effort  - Oxygen supplementation based on oxygen saturation or ABGs  - Provide Smoking Cessation handout, if applicable  - Encourage broncho-pulmonary hygiene including cough, deep breathe, Incentive Spirometry  - Assess the need for suctioning and perform as needed  - Assess and instruct to report SOB or any respiratory difficulty  - Respiratory Therapy support as indicated  - Manage/alleviate anxiety  - Monitor for signs/symptoms of CO2 retention  Outcome: Progressing     Problem:  Patient Centered Care  Goal: Patient preferences are identified and integrated in the patient's plan of care  Description: Interventions:  - What would you like us to know as we care for you?   - Provide timely, complete, and accurate information to patient/family  - Incorporate patient and family knowledge, values, beliefs, and cultural backgrounds into the planning and delivery of care  - Encourage patient/family to participate in care and decision-making at the level they choose  - Honor patient and family perspectives and choices  Outcome: Not Progressing     Problem: NEUROLOGICAL - ADULT  Goal: Achieves stable or improved neurological status  Description: INTERVENTIONS  - Assess for and report changes in neurological status  - Initiate measures to prevent increased intracranial pressure  - Maintain blood pressure and fluid volume within ordered parameters to optimize cerebral perfusion and minimize risk of hemorrhage  - Monitor temperature, glucose, and sodium. Initiate appropriate interventions as ordered  Outcome: Not Progressing  Goal: Achieves maximal functionality and self care  Description: INTERVENTIONS  - Monitor swallowing and airway patency with patient fatigue and changes in neurological status  - Encourage and assist patient to increase activity and self care with guidance from PT/OT  - Encourage visually impaired, hearing impaired and aphasic patients to use assistive/communication devices  Outcome: Not Progressing

## 2024-03-20 NOTE — SPIRITUAL CARE NOTE
Spiritual Care Visit Note    Patient Name: Caron Daniels Date of Spiritual Care Visit: 24   : 1960 Primary Dx: Dysarthria       Referred By: Referral From: Nurse    Spiritual Care Taxonomy:    Intended Effects: Build relationship of care and support    Methods: Demonstrate acceptance;Offer spiritual/Protestant support    Interventions: Acknowledge current situation;Prayer for healing;Share words of hope and inspiration    Visit Type/Summary:     - Spiritual Care: Responded to a request for spiritual care and assessed the patient for spiritual care needs. Offered empathic listening and emotional support. Patient and family expressed appreciation for  visit. Provided support for Patient's spiritual/Protestant requests. Offered prayer.  remains available for follow up.    Spiritual Care support can be requested via an Epic consult. For urgent/immediate needs, please contact the On Call  at: [place]: ext [phone]    Rev Rosa Valenzuela MDiv

## 2024-03-20 NOTE — PROGRESS NOTES
[facility]  [address] [place] [address], [address]  [facility] [address]  [phone]            Caron Daniels Patient Status:  Inpatient    1960 MRN [id number]   Location [facility] 2W/SW Attending Xavier Pérez MD   Hosp Day # 3 PCP MARLA BARNETT     Subjective:  Caron Daniels is a(n) 64 year old female.    Hospital course to date:       Patient has a history of AICD, COPD, daily tobacco use, prior history alcohol abuse.  Hypertension.  She presented to the hospital with shortness of breath, overall weakness and slurring of speech, difficulty with ambulating.  Apparently she was very tangential with hospitalist.  According to her  she started having flulike symptoms few days prior to that admission in 2024.  She was developing fevers, shortness of breath, congestion, overall weakness.  Because of this possibility of slurring of speech stroke order set was also placed.  CT of the head and CT angiogram of the head and neck did not show any obvious acute changes.  It was not clear if MRI is compatible with her AICD.          Objective:  Blood pressure 139/83, pulse 78, temperature 97.2 °F (36.2 °C), temperature source Temporal, resp. rate 19, height 65\", weight 163 lb 9.3 oz (74.2 kg), SpO2 99%.    Physical Exam:  Vitals:    24 0200 24 0400 24 0500 24 0600   BP: 147/79 142/82 114/62 139/83   Pulse: 76 82 61 78   Resp: 14 15 (!) 29 19   Temp:  97.2 °F (36.2 °C)     TempSrc:  Temporal     SpO2: 99% 100% 97% 99%   Weight:       Height:           General: No apparent distress, well nourished, well groomed.  Head- Normocephalic, atraumatic  Eyes- No redness or swelling  Neck- No masses or adenopathy  CV: pulses were palpable and normal, no cyanosis or edema     Neurological:     Mental Status-intermittently wakes up and follows commands occasionally but most of the time is quite sedated.  Did not follow commands on my  examination.    Lab Results   Component Value Date    WBC 13.0 (H) 03/20/2024    HGB 12.7 03/20/2024    HCT 43.2 03/20/2024    .0 03/20/2024    CREATSERUM 0.56 03/20/2024    BUN 18 03/20/2024     (H) 03/20/2024    K 4.1 03/20/2024     03/20/2024    CO2 >40.0 (HH) 03/20/2024     (H) 03/20/2024    CA 9.4 03/20/2024    ALB 3.8 03/20/2024    ALKPHO 39 (L) 03/20/2024    BILT 0.2 03/20/2024    TP 6.3 03/20/2024    AST 14 03/20/2024    ALT 8 (L) 03/20/2024    TSH 1.046 03/17/2024    DDIMER 0.52 03/13/2017    MG 2.4 03/20/2024    PHOS 1.5 (L) 03/20/2024    TROP 0.01 03/13/2017    B12 801 03/17/2024       CT BRAIN OR HEAD (55998)    Result Date: 3/19/2024  CONCLUSION:  1. No acute intracranial process by noncontrast CT technique. 2. Intracranial atherosclerosis. 3. Dependent left greater than right maxillary sinus mucosal thickening. 4. Lesser incidental findings as above.   elm-remote  Dictated by (CST): Neftali El MD on 3/19/2024 at 11:45 AM     Finalized by (CST): Neftali El MD on 3/19/2024 at 11:47 AM                 Assessment:  Patient Active Problem List   Diagnosis    Acute bronchitis    Acute bronchitis, unspecified organism    COPD exacerbation (HCC)    Dysarthria    Hypoxia    Hypercapnia    Influenza    Encephalopathy    Goals of care, counseling/discussion    Advance care planning    Palliative care by specialist     Most likely metabolic encephalopathy.  In the setting of influenza, respiratory failure.  Low likelihood of a stroke.    Repeat CT of the head was not revealing.  Making the likelihood of stroke even less.    Please let us know if any other questions remain.    Toro Watts MD

## 2024-03-21 LAB
ALBUMIN SERPL-MCNC: 3.6 G/DL (ref 3.2–4.8)
ALBUMIN/GLOB SERPL: 1.5 {RATIO} (ref 1–2)
ALP LIVER SERPL-CCNC: 36 U/L
ALT SERPL-CCNC: <7 U/L
AST SERPL-CCNC: 12 U/L (ref ?–34)
BILIRUB SERPL-MCNC: 0.2 MG/DL (ref 0.2–1.1)
BUN BLD-MCNC: 14 MG/DL (ref 9–23)
BUN/CREAT SERPL: 26.9 (ref 10–20)
CALCIUM BLD-MCNC: 9.2 MG/DL (ref 8.7–10.4)
CHLORIDE SERPL-SCNC: 104 MMOL/L (ref 98–112)
CO2 SERPL-SCNC: >40 MMOL/L (ref 21–32)
CREAT BLD-MCNC: 0.52 MG/DL
DEPRECATED RDW RBC AUTO: 46.2 FL (ref 35.1–46.3)
EGFRCR SERPLBLD CKD-EPI 2021: 104 ML/MIN/1.73M2 (ref 60–?)
ERYTHROCYTE [DISTWIDTH] IN BLOOD BY AUTOMATED COUNT: 13.2 % (ref 11–15)
GLOBULIN PLAS-MCNC: 2.4 G/DL (ref 2.8–4.4)
GLUCOSE BLD-MCNC: 127 MG/DL (ref 70–99)
GLUCOSE BLDC GLUCOMTR-MCNC: 121 MG/DL (ref 70–99)
HCT VFR BLD AUTO: 40.8 %
HGB BLD-MCNC: 12.4 G/DL
MCH RBC QN AUTO: 28.9 PG (ref 26–34)
MCHC RBC AUTO-ENTMCNC: 30.4 G/DL (ref 31–37)
MCV RBC AUTO: 95.1 FL
OSMOLALITY SERPL CALC.SUM OF ELEC: 306 MOSM/KG (ref 275–295)
PHOSPHATE SERPL-MCNC: 1.8 MG/DL (ref 2.4–5.1)
PLATELET # BLD AUTO: 229 10(3)UL (ref 150–450)
POTASSIUM SERPL-SCNC: 4 MMOL/L (ref 3.5–5.1)
PROT SERPL-MCNC: 6 G/DL (ref 5.7–8.2)
RBC # BLD AUTO: 4.29 X10(6)UL
SODIUM SERPL-SCNC: 147 MMOL/L (ref 136–145)
WBC # BLD AUTO: 9.4 X10(3) UL (ref 4–11)

## 2024-03-21 PROCEDURE — 99231 SBSQ HOSP IP/OBS SF/LOW 25: CPT | Performed by: REGISTERED NURSE

## 2024-03-21 RX ORDER — DEXTROSE MONOHYDRATE 50 MG/ML
INJECTION, SOLUTION INTRAVENOUS CONTINUOUS
Status: DISCONTINUED | OUTPATIENT
Start: 2024-03-21 | End: 2024-03-22

## 2024-03-21 RX ORDER — IBUPROFEN 600 MG/1
600 TABLET ORAL EVERY 6 HOURS PRN
Status: DISCONTINUED | OUTPATIENT
Start: 2024-03-21 | End: 2024-03-24

## 2024-03-21 RX ORDER — PREDNISONE 20 MG/1
40 TABLET ORAL
Status: DISCONTINUED | OUTPATIENT
Start: 2024-03-22 | End: 2024-03-24

## 2024-03-21 NOTE — PLAN OF CARE
Problem: RESPIRATORY - ADULT  Goal: Achieves optimal ventilation and oxygenation  Description: INTERVENTIONS:  - Assess for changes in respiratory status  - Assess for changes in mentation and behavior  - Position to facilitate oxygenation and minimize respiratory effort  - Oxygen supplementation based on oxygen saturation or ABGs  - Provide Smoking Cessation handout, if applicable  - Encourage broncho-pulmonary hygiene including cough, deep breathe, Incentive Spirometry  - Assess the need for suctioning and perform as needed  - Assess and instruct to report SOB or any respiratory difficulty  - Respiratory Therapy support as indicated  - Manage/alleviate anxiety  - Monitor for signs/symptoms of CO2 retention  Outcome: Progressing     Pt received on continuous BiPAP, tolerating well and saturating appropriately, FiO2 wean down to 45%.  Pt received nebulizer  QID in line with BiPAP.    BiPAP settings and readings as follow:       03/20/24 1945   BiPAP   $ RT Standby Charge (per 15 min) 1   Device V60   BiPAP / CPAP CE# 0433   BiPAP bacteria filter Yes   BIPAP plugged into main power? Yes   Mode AVAPS   Interface Full face mask   Mask Size Small   Control Settings   Set Rate 22 breaths/min   Set EPAP 8   Oxygen Percent (S)  45 %   Inspiratory time 0.9   Insp rise time 3   AVAPS   Min IPAP 15   Max IPAP 36   EPAP Level 8   Set rate 22   Tidal volume 550   BiPAP/CPAP Alarm Settings   Hi Rate 40   Low Rate 10   Hi VT 1500   Low    Hi Pressure 40   Low Pressure 12   Low Pressure Delay 20   Low MV 3   BiPAP/CPAP Monitored Parameters   Pulse 64   SpO2 99 %   PIP 36   Total Rate 22 breaths/min   Minute Volume 12   Tidal Volume 532   Total Leak 42   Trigger % 20   Ti/Ttot % 35   Toleration Well   Skin Integrity Reddened   Skin integrity loccation Nose

## 2024-03-21 NOTE — SLP NOTE
ADULT SWALLOWING EVALUATION    ASSESSMENT    ASSESSMENT/OVERALL IMPRESSION:    Proper PPE worn. Hands sanitized upon entrance/exit Pt room.       BSE ordered 2/2 modified diet/NPO. Pt admitted 3/17/24 with dysarthria. Pt on solid/thin liquids at home, with spouse. PMH includes bronchitis, COPD, former smoker. No PMH of dysphagia at [facility]. Pt denies any past swallowing difficulty. Currently, Pt NPO.    CXR 3/17/24:  CONCLUSION:   Pulmonary vascular congestion, without overt pulmonary edema.    Bibasilar airspace opacities, which may be secondary to subsegmental atelectasis, with differential of alveolar edema or infection, in the appropriate clinical setting.     Pt alert, on 2L/min 02 NC, afebrile and assessed sitting upright in bed (after consulting with RN). Family present. Pt agreed to participate. Pt's learning preference verbal or demonstration. No verbal indication of pain. Vocal quality/intensity adequate. Volitional swallow and cough present; considered functional. Oral motor exam unremarkable. Natural dentition with partial present in room; Pt declined use. Pt self-fed solid and thin liquid trials. Bilabial seal adequate; no anterior loss. Lingual skills adequate for bolus formation, preparation and control of all consistencies. Bite reflex of solid functional in strength. Prolonged mastication on solids without use of partials. Oral cavity essentially clear post swallows.     Pharyngeal response appeared delayed per hyolaryngeal elevation to completion (considered functional in strength/rise to palpation). No overt CSA on solid nor thin liquids (no throat clearing, no coughing, no SOB and clear vocal quality). Overall, swallow function appeared weak. Sp02 ~93% during this assessment.      IMPRESSIONS:    Pt presents with minimal to mild oral dysphagia and possible pharyngeal dysfunction. Collaborated with RN regarding Pt's swallowing plan of care. BSE results/recommendations discussed with Pt. Pt family  v/u. Pt would benefit from additional reinforcement. Swallowing precautions written on white board in room for reinforcement/carry-over of skills for Pt, family and staff. Call light within Pt's reach upon SLP discharge from room.          PLAN:    To f/u x2 sessions to ensure safe intake of diet and reinforce swallowing/aspiration precautions. To monitor for new CXR results. Family education to be continued as available.      RECOMMENDATIONS   Diet Recommendations - Solids: Soft/ Easy to chew  Diet Recommendations - Liquids: thin liquids    Compensatory Strategies Recommended: No straws  Aspiration Precautions: Upright position;Slow rate;Small bites and sips;No straw  Medication Administration Recommendations: Whole in puree  Treatment Plan/Recommendations: Aspiration precautions    HISTORY   MEDICAL HISTORY  Reason for Referral: Altered diet consistency (NPO)    Problem List  Principal Problem:    Dysarthria  Active Problems:    Hypoxia    Hypercapnia    Influenza    Encephalopathy    Goals of care, counseling/discussion    Advance care planning    Palliative care by specialist      Past Medical History  Past Medical History:   Diagnosis Date    Cardiomyopathy (HCC)     CHF (congestive heart failure) (HCC)     COPD (chronic obstructive pulmonary disease) (HCC)     Defibrillator discharge     Essential hypertension        Prior Living Situation: Home with spouse  Diet Prior to Admission: Regular;Thin liquids  Precautions: Aspiration    Patient/Family Goals: to eat    SWALLOWING HISTORY  Current Diet Consistency: NPO    OBJECTIVE   ORAL MOTOR EXAMINATION  Dentition: Natural (has partials; declined use)  Symmetry: Within Functional Limits  Strength: Within Functional Limits    Range of Motion: Within Functional Limits  Rate of Motion: Within Functional Limits    Voice Quality: Clear  Respiratory Status: Supplemental O2;Nasal cannula  Consistencies Trialed: Thin liquids;Hard solid  Method of Presentation: Self  presentation;Cup  Patient Positioning: Upright;Midline    Oral Phase of Swallow: Impaired  Mastication: Impaired  Pharyngeal Phase of Swallow: Impaired  Laryngeal Elevation Timing: Appears impaired    (Please note: Silent aspiration cannot be evaluated clinically. Videofluoroscopic Swallow Study is required to rule-out silent aspiration.)    GOALS  Goal #1 The patient will tolerate soft ETC consistency and thin liquids without overt signs or symptoms of aspiration with 100 % accuracy over 2 session(s).  In Progress   Goal #2 The patient will utilize compensatory strategies as outlined by  BSSE (clinical evaluation) including Slow rate, Small bites, Small sips, No straws, Upright 90 degrees with no feeding assistance 90 % of the time across 2 sessions.    In Progress   FOLLOW UP  Treatment Plan/Recommendations: Aspiration precautions  Number of Visits to Meet Established Goals: 2  Follow Up Needed (Documentation Required): Yes  SLP Follow-up Date: 03/22/24    Thank you for your referral.   If you have any questions, please contact   Trinh Nelson M.S. [facility]/SLP  Speech-Language Pathologist  [facility]  #[phone]

## 2024-03-21 NOTE — PROGRESS NOTES
[facility]   INTERNAL MEDICINE PROGRESS NOTE    CHIEF COMPLAINT   Difficulty Breathing    SUBJECTIVE  24 HR EVENTS   Much better this AM. Alert, conversant w bipap  Follows all commands  Wants water    INPATIENT MEDICATIONS  Scheduled Medications:  sodium phosphate, 15 mmol, Once  pantoprazole, 40 mg, Daily  azithromycin, 500 mg, Q24H  methylPREDNISolone, 40 mg, Daily  thiamine, 500 mg, TID  enoxaparin, 40 mg, Daily  atorvastatin, 80 mg, Nightly  aspirin, 81 mg, Daily  cefTRIAXone, 1 g, Q24H  clopidogrel, 75 mg, Daily  [Held by provider] furosemide, 40 mg, Daily  cetirizine, 10 mg, Daily  [Held by provider] pantoprazole, 20 mg, QAM AC  ipratropium-albuterol, 3 mL, QID     Drips:   dextrose, Last Rate: 83 mL/hr at 03/20/24 2330  dexmedetomidine, Last Rate: Stopped (03/21/24 0553)       PRN Medications:   haloperidol lactate, 2 mg, Q6H PRN  acetaminophen, 650 mg, Q4H PRN   Or  acetaminophen, 650 mg, Q4H PRN  labetalol, 10 mg, Q10 Min PRN  hydrALAzine, 10 mg, Q2H PRN  ondansetron, 4 mg, Q6H PRN  prochlorperazine, 5 mg, Q8H PRN  ipratropium-albuterol, 3 mL, Q6H PRN  influenza virus vaccine PF, 0.5 mL, Prior to discharge       PHYSICAL EXAMINATION  Vitals: /64 (BP Location: Right arm)   Pulse 69   Temp 97.2 °F (36.2 °C) (Temporal)   Resp 16   Ht 5' 5\" (1.651 m)   Wt 163 lb 9.3 oz (74.2 kg)   SpO2 90%   BMI 27.22 kg/m²   Gen: Calm, NAD, well nourished, on AVAPs  Eyes: PERRLA, normal conjunctivae  ENMT: Dry mucous membranes, trachea midline  CV: RRR, no peripheral edema  Resp: Minimal air movement, non-labored respirations, symmetric expansion  GI: Soft, NT, ND, no rebound, no guarding  MSK:  No C/C/E, normal active/passive ROM in C-spine  Skin: No rashes  Neuro: Follows commands, moving all 4 extrem to command  Psych: A&Ox3, linear thought process    LABORATORY VALUES   Recent Labs   Lab 03/17/24  1522 03/18/24  0436 03/19/24  0444 03/20/24  0433 03/20/24  1607 03/21/24  0435   * 140  --   148* 149* 147*   K 3.3* 4.4 4.4 4.1 4.4 4.0   CL 90* 94*  --  101 103 104   CO2 >40.0* >40.0*  --  >40.0* >40.0* >40.0*   BUN 25* 18  --  18 16 14   CREATSERUM 0.89 0.77  --  0.56 0.50* 0.52*   * 151*  --  109* 127* 127*   CA 8.3* 8.6*  --  9.4 9.0 9.2   PHOS  --   --   --  1.5*  --  1.8*   TP 6.6 6.5  --  6.3  --  6.0   ALB 3.9 3.7  --  3.8  --  3.6   AST 38* 27  --  14  --  12   ALT 14 14  --  8*  --  <7*   ALKPHO 41* 43*  --  39*  --  36*   BILT <0.2* <0.2*  --  0.2  --  0.2   EGFRCR 73 87  --  102 105 104     Recent Labs   Lab 03/17/24  1522 03/18/24  0436 03/20/24  0433 03/21/24  0435   WBC 9.6 7.4 13.0* 9.4   HGB 14.1 13.7 12.7 12.4   HCT 44.6 44.8 43.2 40.8   .0 232.0 250.0 229.0   MCV 91.6 93.5 98.6 95.1   MOPERCENT 13.5  --   --   --    EOPERCENT 0.0  --   --   --    BAPERCENT 0.3  --   --   --      Recent Labs   Lab 03/17/24  1522 03/17/24  1524   PCT 0.09*  --    COVID19  --  Not Detected   INFAPCR  --  Positive*   INFBPCR  --  Negative   RSV  --  Negative     ASSESSMENT & PLAN  Caron Daniels - 64 year old female with NICM s/p AICD, COPD, daily tobacco use, prior etoh abuse, and HTN who presented to the hospital with multiple complaints including shortness of breath, weakness, slurred speech, difficulty ambulating.     Acute hypoxemic and hypercapnic respiratory failure  - SpO2 59% on RA, pCO2 95 on abg initially, increased to 109  - suspect that she has chronic hypoxemic and hypercapnic respiratory failure in addition to acute insult  - 2/2 due to COPD exacerbation, possible superimposed PNA, will need to r/o PE as well so will get CTA chest  - on 7L NC initially, now on BiPAP  - continuous pulse ox  - supplemental O2 to maintain SpO2 88-92%  - pulm colleagues consulted, appreciate recs  - improved, trial off BiPAP today if okay w pulm colleagues     COPD exacerbation  Influenza A infection  Presumed bacterial pneumonia  - CTA chest with no PE  - onset of influenza symptoms 5 days prior,  out of window for tamiflu  - IV solumedrol 60 BID  - duonebs q 4 WA and q 6 PRN  - start azithromycin/rocephin emperically  - mucinex 600 BID  - Pulm consutled - IPPV as above     TME  - ddx includes metabolic encephalopathy from hypercapnia, - does have a hx of significant etoh use, wernickes? But states quit > 8 years ago and goes to [facility]., CVA  - TSH and B12 normal, B1 levels pending  - UDS + canabis  - CTH in the ED was negative  - hx of ICD, unsure if compatible with MRI but if able MRI brain  - CTA H/N showed no major occlusion or stenosis  - echo   - s/p ASA and plavix in the ED, start ASA daily  - neuro consulted, appreciate recs  - lipitor 80 mg nightly  - hba1c of 6.6  - PT/OT/SW  - High dose thiamine  - Convert meds to PO, close monitoring of resp status  - 3/20: overnight started on precedex, s/p haldol for delirium  - - improved, trial off BiPAP today if okay w pulm colleagues     Hypernatremia  - Likely 2/2 dehydration w/o oral intake  [  ] switch IVF to D5, additional 24 hrs  [  ] repeat BMP in AM  - Hopefully can have diet today if tolerates staying of BiPAP    HTN  - BP acceptable  - no need for rx now     Hx of ischemic cardiomyopathy s/p AICD  - echo now pending  - holding home HF medications for now     Daily tobacco use  - quit smoking cigarettes in 2000 but smokes 5-6 cigars daily  - did not want nicotine patch    Likely lung CA  - Spiculated 3.8 cm right lower lobe pulmonary mass seen on CTA chest in ED  - Seen by pulm - plan for OP PET     GERD   - Sub PTA PO pantop for IV    Fluids & nutrition  - Now NPO x 48 hrs  - Hold PTA lasix.   - gentle IVF, low rate. Avoid volume overload  - Will need supplemental nutrition pending course     ACP: DNAR/Selective Treatment  Ppx: DVT: Enox. GI: IV PPI for now  Dispo  EDoD:  ~ 3/25  F/u:   - PCP:  MARLA BARNETT    Available via bubble chat or perfect serve 7A - 7P.    Xavier Pérez MD   Internal Medicine - Hospitalist  [facility]

## 2024-03-21 NOTE — PALLIATIVE CARE NOTE
[facility]  part of [facility]  Palliative Care Progress Note    Caron Daniels Patient Status:  Inpatient    1960 MRN [id number]   Location [facility] 2W/SW Attending Xavier Pérez MD   Hosp Day # 4 PCP MARLA BARNETT     The  Cures Act makes medical notes like these available to patients in the interest of transparency. Please be advised this is a medical document. Medical documents are intended to carry relevant information, facts as evident, and the clinical opinion of the practitioner. The medical note is intended as peer to peer communication and may appear blunt or direct. It is written in medical language and may contain abbreviations or verbiage that are unfamiliar.       Subjective     Reviewed symptom medications past 24 hrs: none, off precedex gtt    Patient was seen and examined in bed with no family at the bedside. Pt is alert and oriented and is telling me she wants bipap mask off. She reports severe thirst and tells me she is really hungry. Told her we would check her swallowing and get her some po. She denies dyspnea symptoms and denies any pain. Moved her bowels 2 days ago. VSS.    See summary of discussion below.     Review of Systems:  Unable to obtain ROS due to pt factors above    Allergies:  No Known Allergies    Medications:     Current Facility-Administered Medications:     sodium phosphate 15 mmol in 0.9% NaCl 100mL IVPB premix, 15 mmol, Intravenous, Once    dextrose 5% infusion, , Intravenous, Continuous    [START ON 3/22/2024] predniSONE (Deltasone) tab 40 mg, 40 mg, Oral, Daily with breakfast    pantoprazole (Protonix) 40 mg in sodium chloride 0.9% PF 10 mL IV push, 40 mg, Intravenous, Daily    azithromycin (Zithromax) 500 mg in sodium chloride 0.9% 250mL IVPB premix, 500 mg, Intravenous, Q24H    haloperidol lactate (Haldol) 5 MG/ML injection 2 mg, 2 mg, Intravenous, Q6H PRN    dexmedeTOMIDine in sodium chloride 0.9% (Precedex) 400  mcg/100mL infusion premix, 0.2-1.5 mcg/kg/hr (Dosing Weight), Intravenous, Continuous    enoxaparin (Lovenox) 40 MG/0.4ML SUBQ injection 40 mg, 40 mg, Subcutaneous, Daily    acetaminophen (Tylenol) tab 650 mg, 650 mg, Oral, Q4H PRN **OR** acetaminophen (Tylenol) rectal suppository 650 mg, 650 mg, Rectal, Q4H PRN    labetalol (Trandate) 5 mg/mL injection 10 mg, 10 mg, Intravenous, Q10 Min PRN    hydrALAzine (Apresoline) 20 mg/mL injection 10 mg, 10 mg, Intravenous, Q2H PRN    ondansetron (Zofran) 4 MG/2ML injection 4 mg, 4 mg, Intravenous, Q6H PRN    prochlorperazine (Compazine) 10 MG/2ML injection 5 mg, 5 mg, Intravenous, Q8H PRN    atorvastatin (Lipitor) tab 80 mg, 80 mg, Oral, Nightly    aspirin chewable tab 81 mg, 81 mg, Oral, Daily    ipratropium-albuterol (Duoneb) 0.5-2.5 (3) MG/3ML inhalation solution 3 mL, 3 mL, Nebulization, Q6H PRN    cefTRIAXone (Rocephin) 1 g in D5W 100 mL IVPB-ADD, 1 g, Intravenous, Q24H    clopidogrel (Plavix) tab 75 mg, 75 mg, Oral, Daily    [Held by provider] furosemide (Lasix) tab 40 mg, 40 mg, Oral, Daily    cetirizine (ZyrTEC) tab 10 mg, 10 mg, Oral, Daily    [Held by provider] pantoprazole (Protonix) DR tab 20 mg, 20 mg, Oral, QAM AC    influenza vaccine split quad (Fluzone QIV) 0.5 mL IM injection (ages 6 months to 64 years) 0.5 mL, 0.5 mL, Intramuscular, Prior to discharge    ipratropium-albuterol (Duoneb) 0.5-2.5 (3) MG/3ML inhalation solution 3 mL, 3 mL, Nebulization, QID    Objective     Vital Signs:  Blood pressure (!) 151/122, pulse 82, temperature 97.9 °F (36.6 °C), temperature source Temporal, resp. rate 16, height 5' 5\" (1.651 m), weight 163 lb 9.3 oz (74.2 kg), SpO2 92%.  Body mass index is 27.22 kg/m².  Present Level of pain: 0/10  Non-verbal signs of pain present: No    Physical Exam:  General: Alert and in no apparent distress. Acutely-ill appearing  HEENT: No focal deficits. +dry mucous membranes, +bipap mask  Cardiac: Regular rate and rhythm, S1, S2 normal, no  murmur, rub or gallop.  Lungs: Diminished breath sounds bilaterally.  Normal excursions and effort.  Abdomen: Soft, non-tender, normal bowel sounds X 4 quadrants, no rebound or guarding  Extremities: Without clubbing, cyanosis. Peripheral pulses are 2+. BLE Edema not present  Neurologic: Alert and oriented X3  Skin: Warm and dry.    Prior to admission Palliative performance scale PPSv2 (%): 90    PPS 10%    Hematology:  Lab Results   Component Value Date    WBC 9.4 03/21/2024    HGB 12.4 03/21/2024    HCT 40.8 03/21/2024    .0 03/21/2024       Coags:No results found for: \"PT\", \"INR\", \"PTT\"    Chemistry:  Lab Results   Component Value Date    CREATSERUM 0.52 (L) 03/21/2024    BUN 14 03/21/2024     (H) 03/21/2024    K 4.0 03/21/2024     03/21/2024    CO2 >40.0 (HH) 03/21/2024     (H) 03/21/2024    CA 9.2 03/21/2024    ALB 3.6 03/21/2024    ALKPHO 36 (L) 03/21/2024    BILT 0.2 03/21/2024    TP 6.0 03/21/2024    AST 12 03/21/2024    ALT <7 (L) 03/21/2024    DDIMER 0.52 03/13/2017    MG 2.4 03/20/2024    PHOS 1.8 (L) 03/21/2024    TROP 0.01 03/13/2017       Imaging:  CT BRAIN OR HEAD ([id number])    Result Date: 3/19/2024  CONCLUSION:  1. No acute intracranial process by noncontrast CT technique. 2. Intracranial atherosclerosis. 3. Dependent left greater than right maxillary sinus mucosal thickening. 4. Lesser incidental findings as above.   elm-remote  Dictated by (CST): Neftali El MD on 3/19/2024 at 11:45 AM     Finalized by (CST): Neftali El MD on 3/19/2024 at 11:47 AM           Follow up GOC Discussion        3/21/24-I provided clinical overview to pt as she tells me the last few days have been a blur. She is hopeful she can improve with ongoing supportive care. She really wants bipap off so she can take po. I talked with her about my family meeting yesterday. Confirmed wishes for DNR/DNI-selective with pt. Discussed will need to complete POLST form closer to dc and off bipap. Ongoing  GOC discussions will be needed through clinical course. Provided emotional support to pt who is coping adequately.    I also called and provided clinical update to DEDE Mullins who is main contact for pt. She says family remains hopeful she will continue to improve and family will be coming in today to see her.     3/20/24-I met with pt's family including  Tico, DEDE Mullins, LUISA Abby and pt's son Gee (on speaker phone) in conference room. I provided overview of palliative care. I provided clinical overview and reinforced her overall guarded prognosis. Discussed ongoing issues with resp failure, presumed lung cancer, encephalopathy, COPD and flu/PNA. Family remains hopeful pt will show signs of improvement with ongoing supportive care.     I readdressed pt's code status and discussed life sustaining treatments in her clinical condition. I talked with family about pt's previously stated wishes for DNAR/DNI and  was tearful but tells me wants to respect her wishes. Family is agreeable to DNAR/DNI-selective tx, ok for temporary feeding tube and continue supportive care. Deferred POLST form completion today and discussed importance of completion prior to dc once all decisions are made.    Discussed her ongoing respiratory failure and  tells me he doesn't want her to suffer. I provided education on option for comfort care if she is not improving. Ongoing GOC discussions will be needed through clinical course. Family asked for  support. Answered questions and provided emotional support.     Discussed with Patient's family: Yes  Patient's preference about sharing medical information: speak to pt's family  Patient's decision making preferences: speak to pt's  Gene  Code status: DNAR/DNI-selective  Have advanced directives been discussed with patient or healthcare power of : Yes        Healthcare Agent Appointed: Yes  Healthcare Agent's Name: Tico eMnsah  Healthcare Agent's Phone  Number: [phone]          Spiritual needs addressed: Referral placed for Spiritual Care    Palliative disposition: Ongoing discussions    Procedures:  No intubation  Ok for temporary feeding tube  Palliative Care Assessment and Recommendations     Problem List:     Acute hypoxemic and hypercapnic respiratory failure  COPD exacerbation  Influenza A infection  Presumed bacterial pneumonia  Encephalopathy  HTN  CHF s/p AICD  Abnormal CT-spiculated RLL pulm mass concerning for lung ca  Tobacco abuse  GERD     Goals of care counseling  -see above for details  -Confirmed wishes for DNAR/DNI-selective, ok for temporary feeding tube and continue supportive care.   -Ongoing GOC discussions will be needed through clinical course.  -Family is aware of the option for comfort care if her condition were to deteriorate.  - referral for spiritual support.   -Agreeable to palliative care following  -Dispo:   TBD pending clinical course. Recommend community palliative care program to follow on dc, but may be challenging to get this is uninsured.  SW to help with dc planning.   -Provided emotional support to pt/ family who are  coping adequately.     Advance care planning  -see above for details  -Pt's  Tico Daniels is HC surrogate #[phone].  -Provided [place] of [place] for review and will complete closer to discharge once all decisions are made.     Palliative Performance Scale 10%     Emotion support provided to patient/family today: Yes    A total of 25 mins were spent on this consult, which included all of the following: direct face to face contact, history taking, physical examination, and >50% was spent counseling and coordinating care.    Discussed today's visit with message to Dr. Cassidy and Yamilka GARZA.    I am out of the office tomorrow and will follow back on Monday. Please call my partners at [phone] for any arising palliative care needs in my absence.     Kathryn Sheehan, ANP-BC, ACHPN  [id number]  3/21/2024  9:21 AM  Palliative Care Services

## 2024-03-21 NOTE — PROGRESS NOTES
Pt on continuous BiPAP on the following settings and parameters:       03/21/24 0442   BiPAP   Mode AVAPS   Interface Full face mask   Mask Size Small   Control Settings   Set Rate 22 breaths/min   Set EPAP 8   Oxygen Percent 45 %   Inspiratory time 0.9   Insp rise time 3   AVAPS   Min IPAP 15   Max IPAP 36   EPAP Level 8   Set rate 22   Tidal volume 550   BiPAP/CPAP Alarm Settings   Hi Rate 40   Low Rate 10   Hi VT 1500   Low    Hi Pressure 40   Low Pressure 12   Low Pressure Delay 20   Low MV 3   BiPAP/CPAP Monitored Parameters   PIP 28   Total Rate 22 breaths/min   Minute Volume 14   Tidal Volume 498   Total Leak 52   Trigger % 12   Ti/Ttot % 34   EPAP 8   Toleration Well     No acute events occurred overnight. RT will continue to monitor.

## 2024-03-21 NOTE — PLAN OF CARE
Problem: Patient Centered Care  Goal: Patient preferences are identified and integrated in the patient's plan of care  Description: Interventions:  - Provide timely, complete, and accurate information to patient/family  - Incorporate patient and family knowledge, values, beliefs, and cultural backgrounds into the planning and delivery of care  - Encourage patient/family to participate in care and decision-making at the level they choose  - Honor patient and family perspectives and choices  3/21/2024 1527 by Siena Davis RN  Outcome: Progressing  3/21/2024 1527 by Siena Davis RN  Outcome: Progressing     Problem: Safety Risk - Non-Violent Restraints  Goal: Patient will remain free from self-harm  Description: INTERVENTIONS:  - Apply the least restrictive restraint to prevent harm  - Notify patient and family of reasons restraints applied  - Assess for any contributing factors to confusion (electrolyte disturbances, delirium, medications)  - Discontinue any unnecessary medical devices as soon as possible  - Assess the patient's physical comfort, circulation, skin condition, hydration, nutrition and elimination needs   - Reorient and redirection as needed  - Assess for the need to continue restraints  3/21/2024 1527 by Siena Davis RN  Outcome: Completed  3/21/2024 1527 by Siena Davis RN  Outcome: Progressing     Problem: RESPIRATORY - ADULT  Goal: Achieves optimal ventilation and oxygenation  Description: INTERVENTIONS:  - Assess for changes in respiratory status  - Assess for changes in mentation and behavior  - Position to facilitate oxygenation and minimize respiratory effort  - Oxygen supplementation based on oxygen saturation or ABGs  - Provide Smoking Cessation handout, if applicable  - Encourage broncho-pulmonary hygiene including cough, deep breathe, Incentive Spirometry  - Assess the need for suctioning and perform as needed  - Assess and instruct to report SOB or any respiratory  difficulty  - Respiratory Therapy support as indicated  - Manage/alleviate anxiety  - Monitor for signs/symptoms of CO2 retention  3/21/2024 1527 by Siena Davis RN  Outcome: Progressing  3/21/2024 1527 by Siena Davis RN  Outcome: Progressing     Problem: NEUROLOGICAL - ADULT  Goal: Achieves stable or improved neurological status  Description: INTERVENTIONS  - Assess for and report changes in neurological status  - Initiate measures to prevent increased intracranial pressure  - Maintain blood pressure and fluid volume within ordered parameters to optimize cerebral perfusion and minimize risk of hemorrhage  - Monitor temperature, glucose, and sodium. Initiate appropriate interventions as ordered  3/21/2024 1527 by Siena Davis RN  Outcome: Progressing  3/21/2024 1527 by Siena Davis RN  Outcome: Progressing  Goal: Achieves maximal functionality and self care  Description: INTERVENTIONS  - Monitor swallowing and airway patency with patient fatigue and changes in neurological status  - Encourage and assist patient to increase activity and self care with guidance from PT/OT  - Encourage visually impaired, hearing impaired and aphasic patients to use assistive/communication devices  3/21/2024 1527 by Siena Davis RN  Outcome: Progressing  3/21/2024 1527 by Siena Davis RN  Outcome: Progressing

## 2024-03-21 NOTE — PROGRESS NOTES
Critical Care Progress Note     Assessment / Plan:  Acute on chronic respiratory failure - due to AECOPD, influenza and encephalopathy   - AVAPS when sleeping and as needed  AECOPD  - IV to PO steroids tomorrow  - scheduled bronchodilators  PNA - RPP positive for influenza  - out of the window for Tamiflu  - empiric course of ceftriaxone and azithromycin completes today  Abnormal CT chest - likely has lung cancer  - outpatient PET-CT  Tobacco use  - advised cessation  Encephalopathy - due to TME  - supportive care  Dysarthria - CT brain with no acute process  - neurology has seen  NICM  - TTE this admission with normal EF  FEN  - bedside swallow evaluation  Ppx  - Lovenox  Dispo  - DNAR/select  - palliative care following  - will follow    Critical care time: 35 minutes      Subjective:  More alert today  Wants to eat  No new complaints    Objective:  Vitals:    03/21/24 0300 03/21/24 0400 03/21/24 0500 03/21/24 0600   BP: 140/67 126/69 150/66 140/64   BP Location: Right arm Right arm Right arm Right arm   Pulse: 71 (!) 47 (!) 48 69   Resp: 17 22 22 16   Temp:       TempSrc:       SpO2: 91% 98% 100% 90%   Weight:       Height:         Physical Exam:  General: AVAPS  Skin: no rash, ulcers or subcutaneous nodules  Eyes: anicteric sclerae, moist conjunctivae  Head, ears, nose, throat: atraumatic, oropharynx clear with moist mucous membranes  Neck: trachea midline with no thyromegaly  Heart: regular rate and rhythm, no murmurs / rubs / gallops  Lungs: clear bilaterally  Abdomen: soft, nontender, nondistended   Extremities: no edema or cyanosis  Psych: interactive    Medications:  Reviewed in EMR    Lab Data:  Reviewed in EMR    Imaging:  I independently visualized all relevant chest imaging in PACS and agree with radiology interpretation except where noted.

## 2024-03-22 LAB
ALBUMIN SERPL-MCNC: 3.3 G/DL (ref 3.2–4.8)
ALBUMIN/GLOB SERPL: 1.4 {RATIO} (ref 1–2)
ALP LIVER SERPL-CCNC: 32 U/L
ALT SERPL-CCNC: 22 U/L
ANION GAP SERPL CALC-SCNC: 3 MMOL/L (ref 0–18)
AST SERPL-CCNC: 15 U/L (ref ?–34)
BILIRUB SERPL-MCNC: 0.4 MG/DL (ref 0.2–1.1)
BUN BLD-MCNC: 9 MG/DL (ref 9–23)
BUN/CREAT SERPL: 17 (ref 10–20)
CALCIUM BLD-MCNC: 8.6 MG/DL (ref 8.7–10.4)
CARBOXY THC GCMS UR: 47 NG/MG CREAT
CARBOXY THC GCMS UR: 47 NG/MG CREAT
CHLORIDE SERPL-SCNC: 103 MMOL/L (ref 98–112)
CO2 SERPL-SCNC: 38 MMOL/L (ref 21–32)
CREAT BLD-MCNC: 0.53 MG/DL
DEPRECATED RDW RBC AUTO: 43.1 FL (ref 35.1–46.3)
EGFRCR SERPLBLD CKD-EPI 2021: 104 ML/MIN/1.73M2 (ref 60–?)
ERYTHROCYTE [DISTWIDTH] IN BLOOD BY AUTOMATED COUNT: 13 % (ref 11–15)
GLOBULIN PLAS-MCNC: 2.4 G/DL (ref 2.8–4.4)
GLUCOSE BLD-MCNC: 112 MG/DL (ref 70–99)
HCT VFR BLD AUTO: 39.9 %
HGB BLD-MCNC: 13 G/DL
MCH RBC QN AUTO: 29.7 PG (ref 26–34)
MCHC RBC AUTO-ENTMCNC: 32.6 G/DL (ref 31–37)
MCV RBC AUTO: 91.1 FL
OSMOLALITY SERPL CALC.SUM OF ELEC: 297 MOSM/KG (ref 275–295)
PHOSPHATE SERPL-MCNC: 1.7 MG/DL (ref 2.4–5.1)
PLATELET # BLD AUTO: 220 10(3)UL (ref 150–450)
POTASSIUM SERPL-SCNC: 3.6 MMOL/L (ref 3.5–5.1)
PROT SERPL-MCNC: 5.7 G/DL (ref 5.7–8.2)
RBC # BLD AUTO: 4.38 X10(6)UL
SODIUM SERPL-SCNC: 144 MMOL/L (ref 136–145)
WBC # BLD AUTO: 10 X10(3) UL (ref 4–11)

## 2024-03-22 RX ORDER — POTASSIUM CHLORIDE 14.9 MG/ML
20 INJECTION INTRAVENOUS ONCE
Status: DISCONTINUED | OUTPATIENT
Start: 2024-03-22 | End: 2024-03-24

## 2024-03-22 NOTE — PROGRESS NOTES
[facility]   INTERNAL MEDICINE PROGRESS NOTE    CHIEF COMPLAINT   Difficulty Breathing    SUBJECTIVE  24 HR EVENTS   Much better this AM. Alert, conversant   Follows all commands  Wants water    INPATIENT MEDICATIONS  Scheduled Medications:  potassium phosphate dibasic 15 mmol in sodium chloride 0.9% 250 mL IVPB, 15 mmol, Once   Followed by  potassium chloride, 20 mEq, Once  predniSONE, 40 mg, Daily with breakfast  pantoprazole, 40 mg, Daily  enoxaparin, 40 mg, Daily  atorvastatin, 80 mg, Nightly  aspirin, 81 mg, Daily  clopidogrel, 75 mg, Daily  [Held by provider] furosemide, 40 mg, Daily  cetirizine, 10 mg, Daily  [Held by provider] pantoprazole, 20 mg, QAM AC  ipratropium-albuterol, 3 mL, QID     Drips:         PRN Medications:   ibuprofen, 600 mg, Q6H PRN  haloperidol lactate, 2 mg, Q6H PRN  acetaminophen, 650 mg, Q4H PRN   Or  acetaminophen, 650 mg, Q4H PRN  labetalol, 10 mg, Q10 Min PRN  hydrALAzine, 10 mg, Q2H PRN  ondansetron, 4 mg, Q6H PRN  prochlorperazine, 5 mg, Q8H PRN  ipratropium-albuterol, 3 mL, Q6H PRN  influenza virus vaccine PF, 0.5 mL, Prior to discharge       PHYSICAL EXAMINATION  Vitals: BP (!) 114/92 (BP Location: Right arm)   Pulse 83   Temp 98.5 °F (36.9 °C) (Oral)   Resp 18   Ht 5' 5\" (1.651 m)   Wt 172 lb 8 oz (78.2 kg)   SpO2 92%   BMI 28.71 kg/m²   Gen: Calm, NAD, well nourished, on AVAPs  Eyes: PERRLA, normal conjunctivae  ENMT: Dry mucous membranes, trachea midline  CV: RRR, no peripheral edema  Resp: Minimal air movement, non-labored respirations, symmetric expansion  GI: Soft, NT, ND, no rebound, no guarding  MSK:  No C/C/E, normal active/passive ROM in C-spine  Skin: No rashes  Neuro: Follows commands, moving all 4 extrem to command  Psych: A&Ox3, linear thought process    LABORATORY VALUES   Recent Labs   Lab 03/17/24  1522 03/18/24  0436 03/19/24  0444 03/20/24  0433 03/20/24  1607 03/21/24  0435 03/22/24  0612   * 140  --  148* 149* 147* 144   K 3.3* 4.4  4.4 4.1 4.4 4.0 3.6   CL 90* 94*  --  101 103 104 103   CO2 >40.0* >40.0*  --  >40.0* >40.0* >40.0* 38.0*   BUN 25* 18  --  18 16 14 9   CREATSERUM 0.89 0.77  --  0.56 0.50* 0.52* 0.53*   ANIONGAP  --   --   --   --   --   --  3   * 151*  --  109* 127* 127* 112*   CA 8.3* 8.6*  --  9.4 9.0 9.2 8.6*   PHOS  --   --   --  1.5*  --  1.8* 1.7*   TP 6.6 6.5  --  6.3  --  6.0 5.7   ALB 3.9 3.7  --  3.8  --  3.6 3.3   AST 38* 27  --  14  --  12 15   ALT 14 14  --  8*  --  <7* 22   ALKPHO 41* 43*  --  39*  --  36* 32*   BILT <0.2* <0.2*  --  0.2  --  0.2 0.4   EGFRCR 73 87  --  102 105 104 104     Recent Labs   Lab 03/17/24  1522 03/18/24  0436 03/20/24  0433 03/21/24  0435 03/22/24  0612   WBC 9.6 7.4 13.0* 9.4 10.0   HGB 14.1 13.7 12.7 12.4 13.0   HCT 44.6 44.8 43.2 40.8 39.9   .0 232.0 250.0 229.0 220.0   MCV 91.6 93.5 98.6 95.1 91.1   MOPERCENT 13.5  --   --   --   --    EOPERCENT 0.0  --   --   --   --    BAPERCENT 0.3  --   --   --   --      Recent Labs   Lab 03/17/24  1522 03/17/24  1524   PCT 0.09*  --    COVID19  --  Not Detected   INFAPCR  --  Positive*   INFBPCR  --  Negative   RSV  --  Negative     ASSESSMENT & PLAN  Caron Daniels - 64 year old female with NICM s/p AICD, COPD, daily tobacco use, prior etoh abuse, and HTN who presented to the hospital with multiple complaints including shortness of breath, weakness, slurred speech, difficulty ambulating.     Acute hypoxemic and hypercapnic respiratory failure  - SpO2 59% on RA, pCO2 95 on abg initially, increased to 109  - suspect that she has chronic hypoxemic and hypercapnic respiratory failure in addition to acute insult  - 2/2 due to COPD exacerbation, possible superimposed PNA, will need to r/o PE as well so will get CTA chest  - on 7L NC initially, then required continuous BiPAP ~ 72 hrs  - continuous pulse ox  - supplemental O2 to maintain SpO2 88-92%  - pulm colleagues consulted, appreciate recs  - improved, off BiPAP  [  ] highly suspect  pt will need home O2. Home O2 screen ordered & pending.     COPD exacerbation  Influenza A infection  Presumed bacterial pneumonia  - CTA chest with no PE  - onset of influenza symptoms 5 days prior, out of window for tamiflu  - IV solumedrol 60 BID  - duonebs q 4 WA and q 6 PRN  - start azithromycin/rocephin emperically  - mucinex 600 BID  - Pulm consutled - IPPV as above     TME  - ddx includes metabolic encephalopathy from hypercapnia, - does have a hx of significant etoh use, wernickes? But states quit > 8 years ago and goes to [employer] CVA. Seems likely hypercapnea is driving factor  - TSH and B12 normal, B1 levels pending  - UDS + canabis  - CTH in the ED was negative  - hx of ICD, unsure if compatible with MRI but if able MRI brain  - CTA H/N showed no major occlusion or stenosis  - echo   - s/p ASA and plavix in the ED, start ASA daily  - neuro consulted, appreciate recs  - lipitor 80 mg nightly  - hba1c of 6.6  - PT/OT/SW  - High dose thiamine  - Convert meds to PO, close monitoring of resp status  - 3/20: overnight started on precedex, s/p haldol for delirium     Hypernatremia  - Likely 2/2 dehydration w/o oral intake  - Improved with PO intake. Stop dextrose gtt 3/22  [  ] repeat BMP in AM    HTN  - BP acceptable  - no need for rx now     Hx of ischemic cardiomyopathy s/p AICD  - echo now pending  - holding home HF medications for now     Daily tobacco use  - quit smoking cigarettes in 2000 but smokes 5-6 cigars daily  - did not want nicotine patch    Likely lung CA  - Spiculated 3.8 cm right lower lobe pulmonary mass seen on CTA chest in ED  - Seen by pulm  [  ] plan for OP PET - Unfortunately insurance might be prohibitive until medicaid kicks in.     GERD   - Sub PTA PO pantop for IV    Fluids & nutrition  - Now NPO x 48 hrs  - Hold PTA lasix.   - gentle IVF, low rate. Avoid volume overload  - Will need supplemental nutrition pending course    Urinary retention  [  ] trial without cath 3/22     ACP:  DNAR/Selective Treatment  Ppx: DVT: Enox. GI: IV PPI for now  Dispo  EDoD:  ~ 3/23 - 3/24 pending above. . Worked w PT/OT - near baseline.   F/u:   - PCP:  MARLA BARNETT    Available via bubble chat or perfect serve 7A - 7P.    Xavier Pérez MD   Internal Medicine - Hospitalist  [facility]

## 2024-03-22 NOTE — DIETARY NOTE
ADULT NUTRITION REASSESSMENT    Pt is at moderate nutrition risk.  Pt does not meet malnutrition criteria at this time.     RECOMMENDATIONS TO MD: CPM. Will monitor GOC to drive nutrition plan of care.     ADMITTING DIAGNOSIS:  Hypercapnia [R06.89]  Influenza [J11.1]  Hypoxia [R09.02]  Dysarthria [R47.1]  PERTINENT PAST MEDICAL HISTORY:   Past Medical History:   Diagnosis Date    Cardiomyopathy (HCC)     CHF (congestive heart failure) (HCC)     COPD (chronic obstructive pulmonary disease) (HCC)     Defibrillator discharge     Essential hypertension      PATIENT STATUS: Initial 03/19/24: Pt admit 3/17 for c/o: shortness of breath, weakness, slurred speech, difficulty ambulating. Findings: +Influenza, probable lung mass, PNA. Pt currently on AVAPS PMH sig for nonischemic cardiomyopathy s/p AICD, COPD, daily tobacco use, prior etoh abuse, and HTN .   Pt assessed due to screening at risk for unplanned weight loss and eating poorly as well as EN consult this am. Discussed at care rounds. Pt with decreased mentation and unable to take po intake safely, with acute on chronic respiratory failure on AVAPS. +lethargy, on restraints. Plan is for spouse (who has been ill) to visit tomorrow and meet with palliative care. RD will await POC after this meeting to determine nutrition plan of care and need to obtain diet hx from spouse.  Pt appears well nourished (visual) and WEIGHT hx:   Reflects pt is overweight (BMI 27.22 kg/m2) and there has been weight loss since 2019 but timeframe for loss n/a. DIET hx: obtain tomorrow when spouse here unless significant code status change. Suspect po intake decline for 7 days PTA associated with symptoms starting  7 days PTA.  Discussed potential feeding tube placement but to place this on hold for now in the current scenario. EN order set cancelled.   Pt is at risk for lyte/mineral deficits (Mg,phos, k+) in view of hypokalemia and decreased po intake recently + decreased mentation.  Will add  on phos and mg level for am.   03/22/24 UPDATE: PO diet resumed on 3/21. Pt off BiPAP. GOC discussions on-going. Pt sitting up in chair with family at bedside. Reports excellent appetite/intake this AM. Reports BM early this AM.       FOOD/NUTRITION RELATED HISTORY:  Appetite: Good and Improved  Intake:  NPO 3/19-3/21 AM; diet resumed on 3/21- ordering 3 trays per day per kitchen record   Intake Meeting Needs: No  Percent Meals Eaten (last 3 days)       Date/Time Percent Meals Eaten (%)    03/21/24 0939 0 %     Percent Meals Eaten (%): npo at 03/21/24 0939    03/21/24 1407 100 %           Food Allergies: No Known Food Allergies (NKFA)  Cultural/Ethnic/Cheondoism Preferences: Not Obtained    GASTROINTESTINAL: +BM 3/21 large soft, brown stool x1 per flowsheet; BM x1 early this AM per pt and Swallow evaluation noted    MEDICATIONS: reviewed; Noted cardiac electrolyte replacement protocol ordered   potassium phosphate dibasic 15 mmol in sodium chloride 0.9% 250 mL IVPB  15 mmol Intravenous Once    Followed by    potassium chloride  20 mEq Intravenous Once    predniSONE  40 mg Oral Daily with breakfast    enoxaparin  40 mg Subcutaneous Daily    atorvastatin  80 mg Oral Nightly    aspirin  81 mg Oral Daily    clopidogrel  75 mg Oral Daily    [Held by provider] furosemide  40 mg Oral Daily    cetirizine  10 mg Oral Daily    pantoprazole  20 mg Oral QAM AC    ipratropium-albuterol  3 mL Nebulization QID   Prn: ibuprofen, haloperidol lactate, acetaminophen **OR** acetaminophen, labetalol, hydrALAzine, ondansetron, prochlorperazine, ipratropium-albuterol, influenza virus vaccine PF    LABS: reviewed; noted low-normal K and hypophosphatemia with replacement ordered per protocol  Recent Labs     03/20/24  0433 03/20/24  1607 03/21/24  0435 03/22/24  0612   * 127* 127* 112*   BUN 18 16 14 9   CREATSERUM 0.56 0.50* 0.52* 0.53*   CA 9.4 9.0 9.2 8.6*   MG 2.4  --   --   --    * 149* 147* 144   K 4.1 4.4 4.0 3.6     103 104 103   CO2 >40.0* >40.0* >40.0* 38.0*   PHOS 1.5*  --  1.8* 1.7*   OSMOCALC 308* 311* 306* 297*     NUTRITION RELATED PHYSICAL FINDINGS:  - Nutrition Focused Physical Exam (NFPE): well nourished per visual exam  - Fluid Accumulation: none  see RN documentation for details  - Skin Integrity: intact see RN documentation for details    ANTHROPOMETRICS:  HT: 165.1 cm (5' 5\")  WT: 78.2 kg (172 lb 8 oz) - (wt up 6% (10 lbs) from previous assessment - questionable accuracy)   BMI: Body mass index is 28.71 kg/m².  BMI CLASSIFICATION: 25-29.9 kg/m2 - overweight  IBW: 125 lbs        130 % IBW  Usual Body Wt: n/a last weight in system 2019 203  lbs     80 % UBW 5 years ago.     WEIGHT HISTORY:  Patient Weight(s) for the past 336 hrs:   Weight   03/21/24 2236 78.2 kg (172 lb 8 oz)   03/21/24 0800 81.8 kg (180 lb 5.4 oz)   03/19/24 0000 74.2 kg (163 lb 9.3 oz)   03/18/24 0300 80 kg (176 lb 5.9 oz)   03/17/24 2015 78.6 kg (173 lb 3.2 oz)   03/17/24 1535 80.7 kg (178 lb)     Wt Readings from Last 10 Encounters:   03/21/24 78.2 kg (172 lb 8 oz)   03/31/19 92.1 kg (203 lb)   03/16/17 95.9 kg (211 lb 7 oz)     NUTRITION DIAGNOSIS/PROBLEM:   Inadequate oral intake related to Decreased ability to consume sufficient energy d/t confusion and respiratory failure as evidenced by NPO, 0 nutrition intkae.     NUTRITION DIAGNOSIS PROGRESS:  Improvement (unresolved) - diet resumed with improved appetite/intake    NUTRITION INTERVENTION:     NUTRITION PRESCRIPTION:   Estimated Nutrition needs: --dosing wt of 74 kg - wt taken on 3/19   Calories: 0041-3718 calories/day (20-25 calories per kg Dosing wt)  Protein:   g protein/day (1.2-1.5  g protein/kg Dosing wt)  Fluid Needs: ~30 ml/kg maintenance: 2220 ml.     - Diet:       Procedures    Low Fiber/Soft diet Low Fiber/Soft, No Straw; Fluid Consistency: Nectar Thick / Mildly Thick Liquids; Texture Consistency: Soft / Easy to Chew ; Is Patient on Accuchecks? No   - Medical Food  Supplements: none  - Vitamin and mineral supplements: thiamin 100 mg TID  - Feeding assistance: independent  - Nutrition education: not appropriate at this time   - Coordination of nutrition care: collaboration with other providers - discussed with RN on unit  - Discharge and transfer of nutrition care to new setting or provider: monitor plans     MONITOR AND EVALUATE/NUTRITION GOALS:  - Food and Nutrient Intake:      Monitor: adequacy of PO intake  - Food and Nutrient Administration:      Monitor: N/A  - Anthropometric Measurement:    Monitor weight  - Nutrition Goals:      PO greater than 75% of meals, labs within acceptable limits, and maintain true wt within 5%    DIETITIAN FOLLOW UP: RD to follow and monitor nutrition status    Nicole Causey MS, RD, LDN, [facility]  [phone]

## 2024-03-22 NOTE — CM/SW NOTE
Pt discussed in RN DC rounds. YOU was informed that MD signed the PA forms. SW called [employer]- Joni, left vm.     200pm- SW scanned completed and signed packet for PA to Joni via secured email.     400pm- SW received email from Joni in regards to application submitted. Pt is now Public aid pending. SW provided family a copy of the application. DIL asked about oxygen and nebulizer. SW informed that everything would be private pay until medicaid kicks in. Daughter asked about Outpt PET scan, SW informed her that it would be private pay cost, until medicaid kicks in. SW answered all questions for daughter. Daughter and son wishes for patient to do stairs prior to leaving. SW updated care team.       YOU/BULL to remain available for support and/or discharge planning.     Mackenzie Romero, MSW, LSW   x [phone]

## 2024-03-22 NOTE — PHYSICAL THERAPY NOTE
PHYSICAL THERAPY EVALUATION - INPATIENT     Room Number: 572/572-A  Evaluation Date: 3/22/2024  Type of Evaluation: Initial   Physician Order: PT Eval and Treat    Presenting Problem: metabolic encephalopathy, COPD exacerbation  Co-Morbidities : copd, chf, htn, etoh, aicd  Reason for Therapy: Mobility Dysfunction and Discharge Planning    PHYSICAL THERAPY ASSESSMENT   Patient is a 64 year old female admitted 3/17/2024 for AMS, slurred speech, SOB. Imaging (-) for acute findings, diagnosed with metabolic encephalopathy, COPD exacerbation. Pt had agitation, required restraints, but has now improved, new orders for PT evaluation.     Prior to admission, patient's baseline is independent, works, lives with her spouse.  Patient is currently functioning near baseline with gait and stair negotiation.  Patient is requiring contact guard assist as a result of the following impairments: decreased functional strength and decreased endurance/aerobic capacity.  Physical Therapy will continue to follow for duration of hospitalization.    Patient will benefit from continued skilled PT Services For duration of hospitalization, however, given the patient is functioning near baseline level do not anticipate skilled therapy needs at discharge .    PLAN  PT Treatment Plan: Endurance;Gait training;Patient education  Rehab Potential : Good  Frequency (Obs): 3-5x/week    PHYSICAL THERAPY MEDICAL/SOCIAL HISTORY   History related to current admission:  Caron Daniels is a 64 year old female with history of COPD, CHF who was admitted on 3/17/2024 for SOB, weakness, concern for stroke with slurred speech. See below for reviewed labs and imaging. Pt was admitted for treatment and evaluation of influenza A, pneumonia, acute hypoxemic/hypercapnic respiratory failure, COPD exacerbation and encephalopathy. RRT called 3/18 for worsening agitation, refusing to wear bipap->transferred to ICU.    Problem List  Principal Problem:     Dysarthria  Active Problems:    Hypoxia    Hypercapnia    Influenza    Encephalopathy    Goals of care, counseling/discussion    Advance care planning    Palliative care by specialist      HOME SITUATION  Home Situation  Type of Home: Apartment  Stairs to Enter : 13  Railing: Yes  Lives With: Spouse  Drives: Yes  Patient Owned Equipment: None  Patient Regularly Uses: Glasses     Prior Level of [place]: Pt reports ind pta, did not use assistive device.  Pt lives with supportive spouse who will be able to assist as needed upon edw dc.      SUBJECTIVE  \"I feel great, they tell me I was sleeping for a few days\"    PHYSICAL THERAPY EXAMINATION   OBJECTIVE  Precautions: Bed/chair alarm  Fall Risk: Standard fall risk    WEIGHT BEARING RESTRICTION  Weight Bearing Restriction: None                PAIN ASSESSMENT  Ratin  Location: denies at this time  Management Techniques: Activity promotion    COGNITION  Orientation Level:  oriented x4  Following Commands:  does not follow commands  Pt is tangential in conversation, difficult to keep on task, needs redirection    RANGE OF MOTION AND STRENGTH ASSESSMENT  Upper extremity ROM and strength are within functional limits   Lower extremity ROM is within functional limits   Lower extremity strength is within functional limits     BALANCE  Static Sitting: Good  Dynamic Sitting: Good  Static Standing: Fair +  Dynamic Standing: Fair +      O2 WALK  Oxygen Therapy  SPO2% on Oxygen at Rest: 92  At rest oxygen flow (liters per minute): 2  SPO2% Ambulation on Oxygen: 89  Ambulation oxygen flow (liters per minute): 2    AM-PAC '6-Clicks' INPATIENT SHORT FORM - BASIC MOBILITY  How much difficulty does the patient currently have...  Patient Difficulty: Turning over in bed (including adjusting bedclothes, sheets and blankets)?: A Little   Patient Difficulty: Sitting down on and standing up from a chair with arms (e.g., wheelchair, bedside commode, etc.): A Little   Patient Difficulty:  Moving from lying on back to sitting on the side of the bed?: A Little   How much help from another person does the patient currently need...   Help from Another: Moving to and from a bed to a chair (including a wheelchair)?: A Little   Help from Another: Need to walk in hospital room?: A Little   Help from Another: Climbing 3-5 steps with a railing?: A Little     AM-PAC Score:  Raw Score: 18   Approx Degree of Impairment: 46.58%   Standardized Score (AM-PAC Scale): 43.63   CMS Modifier (G-Code): CK    FUNCTIONAL ABILITY STATUS  Functional Mobility/Gait Assessment  Gait Assistance: Contact guard assist  Distance (ft): 50  Assistive Device: None  Pattern: Shuffle    Supine to Sit: independent  Sit to Supine: independent  Sit to Stand: contact guard assist    Exercise/Education Provided:  Energy conservation  Functional activity tolerated  Gait training    Patient ok to see per rn.   Pt recd in long sitting in her bed, family present, educated in role of physical therapy, goals for session, importance of consistent mobility.  Pt agreeable to physical therapy.    Pt educated in pacing, energy conservation, paccing, upright posture.  Gait training deficits include mild LOB, pt able to correct herself. Main focus this session on energy conservation.   Pt tolerated ambulation very well, pt reports being happy with her progress.   Pt returned to bedside chair, encouraged to also ambulate with [facility] staff in addition to PT in order to improve endurance, strength, and activity tolerance.   Rn aware of session and assist needed, recommendations for activity.     The patient's Approx Degree of Impairment: 46.58% has been calculated based on documentation in the [facility] '6 clicks' Inpatient Basic Mobility Short Form.  Research supports that patients with this level of impairment may benefit from home with spouse/family.    Final disposition will be made by interdisciplinary medical team.    Patient End of Session: Up in chair;Call  light within reach;Needs met;RN aware of session/findings;All patient questions and concerns addressed;Alarm set    CURRENT GOALS  Goals to be met by: 3/25/24  Patient Goal Patient's self-stated goal is: to go home   Goal #1 Patient is able to demonstrate supine - sit EOB @ level: independent     Goal #1   Current Status    Goal #2 Patient is able to demonstrate transfers EOB to/from BSC at assistance level: independent with none     Goal #2  Current Status    Goal #3 Patient is able to ambulate 150 feet with assist device: none at assistance level: supervision   Goal #3   Current Status    Goal #4 Patient will negotiate 4 stairs/one curb w/ assistive device and supervision   Goal #4   Current Status    Goal #5 Patient to demonstrate independence with home activity/exercise instructions provided to patient in preparation for discharge.   Goal #5   Current Status    Goal #6    Goal #6  Current Status      Patient Evaluation Complexity Level:  History Moderate - 1 or 2 personal factors and/or co-morbidities   Examination of body systems Moderate - addressing a total of 3 or more elements   Clinical Presentation  Moderate - Evolving   Clinical Decision Making  Moderate Complexity     Therapeutic Activity:  15 minutes

## 2024-03-22 NOTE — PROGRESS NOTES
Critical Care Progress Note     Assessment / Plan:  Acute on chronic respiratory failure - due to AECOPD, influenza and encephalopathy   - AVAPS when sleeping and as needed  AECOPD  - IV to PO steroids today  - scheduled bronchodilators  PNA - RPP positive for influenza  - out of the window for Tamiflu  - completed ceftriaxone and azithromycin  Abnormal CT chest - likely has lung cancer  - outpatient PET-CT. Discussed with patient and daughter in law today  Tobacco use  - advised cessation  Encephalopathy - due to TME  - supportive care  Dysarthria - CT brain with no acute process  - neurology has seen  NICM  - TTE this admission with normal EF  Ppx  - Lovenox  Dispo  - DNAR/select  - palliative care following  - desat screen      Subjective:  No complaints    Objective:  Vitals:    03/21/24 2236 03/21/24 2333 03/22/24 0150 03/22/24 0400   BP: (!) 113/91   (!) 114/92   BP Location: Right arm   Right arm   Pulse: 91 92 67 83   Resp: 18   18   Temp: 98.6 °F (37 °C)   98.5 °F (36.9 °C)   TempSrc: Oral   Oral   SpO2: 96% 94% 97% 92%   Weight: 172 lb 8 oz (78.2 kg)      Height:         Physical Exam:  General: laying in bed  Skin: no rash, ulcers or subcutaneous nodules  Eyes: anicteric sclerae, moist conjunctivae  Head, ears, nose, throat: atraumatic, oropharynx clear with moist mucous membranes  Neck: trachea midline with no thyromegaly  Heart: regular rate and rhythm, no murmurs / rubs / gallops  Lungs: clear bilaterally  Abdomen: soft, nontender, nondistended   Extremities: no edema or cyanosis  Psych: interactive    Medications:  Reviewed in EMR    Lab Data:  Reviewed in EMR    Imaging:  I independently visualized all relevant chest imaging in PACS and agree with radiology interpretation except where noted.

## 2024-03-22 NOTE — PLAN OF CARE
Pt A/Ox4, max assist. 3L O2 overnight. Bipap worn overnight. IV D5 continued. Pt educated on importance of swallowing precautions.     Problem: Patient Centered Care  Goal: Patient preferences are identified and integrated in the patient's plan of care  Description: Interventions:  - What would you like us to know as we care for you? I would like to return home.   - Provide timely, complete, and accurate information to patient/family  - Incorporate patient and family knowledge, values, beliefs, and cultural backgrounds into the planning and delivery of care  - Encourage patient/family to participate in care and decision-making at the level they choose  - Honor patient and family perspectives and choices  Outcome: Progressing     Problem: RESPIRATORY - ADULT  Goal: Achieves optimal ventilation and oxygenation  Description: INTERVENTIONS:  - Assess for changes in respiratory status  - Assess for changes in mentation and behavior  - Position to facilitate oxygenation and minimize respiratory effort  - Oxygen supplementation based on oxygen saturation or ABGs  - Provide Smoking Cessation handout, if applicable  - Encourage broncho-pulmonary hygiene including cough, deep breathe, Incentive Spirometry  - Assess the need for suctioning and perform as needed  - Assess and instruct to report SOB or any respiratory difficulty  - Respiratory Therapy support as indicated  - Manage/alleviate anxiety  - Monitor for signs/symptoms of CO2 retention  Outcome: Progressing     Problem: NEUROLOGICAL - ADULT  Goal: Achieves stable or improved neurological status  Description: INTERVENTIONS  - Assess for and report changes in neurological status  - Initiate measures to prevent increased intracranial pressure  - Maintain blood pressure and fluid volume within ordered parameters to optimize cerebral perfusion and minimize risk of hemorrhage  - Monitor temperature, glucose, and sodium. Initiate appropriate interventions as ordered  Outcome:  Progressing  Goal: Achieves maximal functionality and self care  Description: INTERVENTIONS  - Monitor swallowing and airway patency with patient fatigue and changes in neurological status  - Encourage and assist patient to increase activity and self care with guidance from PT/OT  - Encourage visually impaired, hearing impaired and aphasic patients to use assistive/communication devices  Outcome: Progressing

## 2024-03-23 LAB
ALBUMIN SERPL-MCNC: 3.5 G/DL (ref 3.2–4.8)
ALBUMIN/GLOB SERPL: 1.5 {RATIO} (ref 1–2)
ALP LIVER SERPL-CCNC: 36 U/L
ALT SERPL-CCNC: 21 U/L
ANION GAP SERPL CALC-SCNC: 5 MMOL/L (ref 0–18)
AST SERPL-CCNC: 22 U/L (ref ?–34)
BILIRUB SERPL-MCNC: 0.4 MG/DL (ref 0.2–1.1)
BUN BLD-MCNC: 11 MG/DL (ref 9–23)
BUN/CREAT SERPL: 22.9 (ref 10–20)
CALCIUM BLD-MCNC: 8.8 MG/DL (ref 8.7–10.4)
CHLORIDE SERPL-SCNC: 106 MMOL/L (ref 98–112)
CO2 SERPL-SCNC: 35 MMOL/L (ref 21–32)
CREAT BLD-MCNC: 0.48 MG/DL
DEPRECATED RDW RBC AUTO: 42.4 FL (ref 35.1–46.3)
EGFRCR SERPLBLD CKD-EPI 2021: 106 ML/MIN/1.73M2 (ref 60–?)
ERYTHROCYTE [DISTWIDTH] IN BLOOD BY AUTOMATED COUNT: 13.2 % (ref 11–15)
GLOBULIN PLAS-MCNC: 2.4 G/DL (ref 2.8–4.4)
GLUCOSE BLD-MCNC: 85 MG/DL (ref 70–99)
HCT VFR BLD AUTO: 40.3 %
HGB BLD-MCNC: 13.2 G/DL
MCH RBC QN AUTO: 29.1 PG (ref 26–34)
MCHC RBC AUTO-ENTMCNC: 32.8 G/DL (ref 31–37)
MCV RBC AUTO: 89 FL
OSMOLALITY SERPL CALC.SUM OF ELEC: 301 MOSM/KG (ref 275–295)
PHOSPHATE SERPL-MCNC: 2.9 MG/DL (ref 2.4–5.1)
PLATELET # BLD AUTO: 212 10(3)UL (ref 150–450)
POTASSIUM SERPL-SCNC: 3.4 MMOL/L (ref 3.5–5.1)
POTASSIUM SERPL-SCNC: 3.4 MMOL/L (ref 3.5–5.1)
POTASSIUM SERPL-SCNC: 4.8 MMOL/L (ref 3.5–5.1)
PROT SERPL-MCNC: 5.9 G/DL (ref 5.7–8.2)
RBC # BLD AUTO: 4.53 X10(6)UL
SODIUM SERPL-SCNC: 146 MMOL/L (ref 136–145)
WBC # BLD AUTO: 8 X10(3) UL (ref 4–11)

## 2024-03-23 RX ORDER — IPRATROPIUM BROMIDE AND ALBUTEROL SULFATE 2.5; .5 MG/3ML; MG/3ML
3 SOLUTION RESPIRATORY (INHALATION) EVERY 6 HOURS PRN
Qty: 120 EACH | Refills: 0 | Status: SHIPPED | OUTPATIENT
Start: 2024-03-23

## 2024-03-23 RX ORDER — POTASSIUM CHLORIDE 20 MEQ/1
40 TABLET, EXTENDED RELEASE ORAL EVERY 4 HOURS
Status: COMPLETED | OUTPATIENT
Start: 2024-03-23 | End: 2024-03-23

## 2024-03-23 RX ORDER — PREDNISONE 20 MG/1
TABLET ORAL
Qty: 14 TABLET | Refills: 0 | Status: SHIPPED | OUTPATIENT
Start: 2024-03-24 | End: 2024-04-05

## 2024-03-23 NOTE — SLP NOTE
SPEECH DAILY NOTE - INPATIENT    ASSESSMENT & PLAN   ASSESSMENT    Proper PPE worn. Hands sanitized upon entrance/exit Pt room.       Pt alert, afebrile and on room air. Pt seen for swallow analysis per BSE recommendations (after consulting with RN). Pt agreed to participate. Pt's preferred method of learning verbal. Pt upright in chair; observed with current diet of soft ETC/thin liquids for monitoring diet tolerance. Pt now with partials in place and observed with solid trials for candidacy of diet upgrade. Swallowing precautions/strategies discussed; minimal cues needed for execution. Functional bite reflex/mastication/lingual skills on soft ETC and solids. No significant oral retention. Pharyngeal response appeared to trigger within 1-2 sec per hyolaryngeal elevation to completion (functional rise/strength per palpation). No overt CSA on solid, soft solid nor thin liquids (no coughing, no throat clearing, clear vocal quality and no SOB). Diet to be upgraded to SOLIDS. Collaborated with RN regarding Pt's swallowing plan of care. Per RN, Pt with good tolerance of current diet. No report of difficulty taking meds. No new CXR completed. Sp02 ~93% during this session. Call light within Pt's reach upon SLP discharge from room.        CXR 3/17/24:  CONCLUSION:   Pulmonary vascular congestion, without overt pulmonary edema.    Bibasilar airspace opacities, which may be secondary to subsegmental atelectasis, with differential of alveolar edema or infection, in the appropriate clinical setting.     PLAN:    To f/u x1 session to ensure safe intake of NEW upgraded diet and reinforce swallowing/aspiration precautions. To monitor for new CXR results.           Diet Recommendations - Solids: Regular  Diet Recommendations - Liquids: Thin Liquids    Compensatory Strategies Recommended: No straws  Aspiration Precautions: Upright position;Slow rate;Small bites and sips;No straw  Medication Administration Recommendations: Whole in  puree    Patient Experiencing Pain: No  Treatment Plan  Treatment Plan/Recommendations: Aspiration precautions    Interdisciplinary Communication: Discussed with RN      GOALS  Goal #1 The patient will tolerate soft ETC consistency and thin liquids without overt signs or symptoms of aspiration with 100 % accuracy over 2 session(s).    No CSA on current diet. Pt now wearing partials. Diet upgraded to solids.  To f/u x1       .   In Progress/Updated 3/23   Goal #2 The patient will utilize compensatory strategies as outlined by  BSSE (clinical evaluation) including Slow rate, Small bites, Small sips, No straws, Upright 90 degrees with no feeding assistance 90 % of the time across 2 sessions.    Minimal cues for controlled amts.       In Progress   FOLLOW UP  Follow Up Needed (Documentation Required): Yes  SLP Follow-up Date: 03/25/24  Number of Visits to Meet Established Goals: 2    Session: 1    If you have any questions, please contact   Trinh Nelson M.S. CCC/SLP  Speech-Language Pathologist  [facility]  #[phone]

## 2024-03-23 NOTE — PHYSICAL THERAPY NOTE
PHYSICAL THERAPY TREATMENT NOTE - INPATIENT     Room Number: 572/572-A       Presenting Problem: metabolic encephalopathy, COPD exacerbation  Co-Morbidities : copd, chf, htn, etoh, aicd    Problem List  Principal Problem:    Dysarthria  Active Problems:    Hypoxia    Hypercapnia    Influenza    Encephalopathy    Goals of care, counseling/discussion    Advance care planning    Palliative care by specialist      PHYSICAL THERAPY ASSESSMENT   Patient is a 64 year old female admitted 3/17/2024 for AMS, slurred speech, SOB. Imaging (-) for acute findings, diagnosed with metabolic encephalopathy, COPD exacerbation. Pt had agitation, required restraints, but has now improved.  Prior to admission, patient's baseline is independent, works, lives with her spouse.   Patient demonstrates good  progress this session, goals  remain in progress.    Patient continues to function near baseline with gait and stair negotiation.  Contributing factors to remaining limitations include decreased endurance/aerobic capacity.  Next session anticipate patient to progress gait and stair negotiation.  Physical Therapy will continue to follow patient for duration of hospitalization.    Patient continues to benefit from continued skilled PT services: For duration of hospitalization, however, given the patient is functioning near baseline level do not anticipate skilled therapy needs at discharge .    PLAN  PT Treatment Plan: Endurance;Gait training;Patient education  Frequency (Obs): 3-5x/week    SUBJECTIVE  \"I am so grateful for my family and friends, I guess I was pretty sick\"  pt tearful    OBJECTIVE  Precautions: Bed/chair alarm    WEIGHT BEARING RESTRICTION                PAIN ASSESSMENT   Ratin  Location: denies  Management Techniques: Activity promotion    BALANCE  Static Sitting: Good  Dynamic Sitting: Good  Static Standing: Fair +  Dynamic Standing: Fair +    ACTIVITY TOLERANCE                          O2 WALK  Oxygen Therapy  SPO2%  on Room Air at Rest: 93  SPO2% Ambulation on Room Air: 87 (recovered to 89% within 15 seconds upon sitting, 87-89% on stairs)    AM-PAC '6-Clicks' INPATIENT SHORT FORM - BASIC MOBILITY  How much difficulty does the patient currently have...  Patient Difficulty: Turning over in bed (including adjusting bedclothes, sheets and blankets)?: None   Patient Difficulty: Sitting down on and standing up from a chair with arms (e.g., wheelchair, bedside commode, etc.): None   Patient Difficulty: Moving from lying on back to sitting on the side of the bed?: None   How much help from another person does the patient currently need...   Help from Another: Moving to and from a bed to a chair (including a wheelchair)?: None   Help from Another: Need to walk in hospital room?: A Little   Help from Another: Climbing 3-5 steps with a railing?: A Little     AM-PAC Score:  Raw Score: 22   Approx Degree of Impairment: 20.91%   Standardized Score (AM-PAC Scale): 53.28   CMS Modifier (G-Code):     FUNCTIONAL ABILITY STATUS  Functional Mobility/Gait Assessment  Gait Assistance: Supervision  Distance (ft): 150  Assistive Device: Rolling walker (attempted without rw, pt unsteady)  Pattern: Within Functional Limits  Stairs: Stairs  How Many Stairs: 9  Device: 2 Rails  Assist: Supervision    Supine to Sit: independent  Sit to Supine: independent  Sit to Stand: modified independent    Additional information: Pt ok to see per rn, pt recd in supine, educated in role of PT, goals for session, importance of consistent mobility.    Pt is alert and oriented x 4, able to follow all commands.  Pt performed functional mobility indicated above. O2sat typically 89% during ambulation, dec to 87% briefly after ambulation on levels and on stairs, recovered quickly to 89%(pt with h/o COPD). Gait training with rw with emphasis on pacing, energy conservation, pt tolerated well.  Pt balance and energy improved with use of rw. Pt reports she has many friends and  family that she believes she can get a rollator from for use at dc if still needed, if not, discussed that we can supply.    The patient's Approx Degree of Impairment: 20.91% has been calculated based on documentation in the [facility] '6 clicks' Inpatient Daily Activity Short Form.  Research supports that patients with this level of impairment may benefit from home.  Final disposition will be made by interdisciplinary medical team.      Patient End of Session: In bed;Needs met;Call light within reach;RN aware of session/findings;All patient questions and concerns addressed    CURRENT GOALS   Goals to be met by: 3/25/24  Patient Goal Patient's self-stated goal is: to go home   Goal #1 Patient is able to demonstrate supine - sit EOB @ level: independent      Goal #1   Current Status     Goal #2 Patient is able to demonstrate transfers EOB to/from [facility] [place], [place] at assistance level: independent with none      Goal #2  Current Status     Goal #3 Patient is able to ambulate 150 feet with assist device: none at assistance level: supervision   Goal #3   Current Status     Goal #4 Patient will negotiate 4 stairs/one curb w/ assistive device and supervision   Goal #4   Current Status     Goal #5 Patient to demonstrate independence with home activity/exercise instructions provided to patient in preparation for discharge.   Goal #5   Current Status     Goal #6     Goal #6  Current Status       Therapeutic Activity: 25 minutes

## 2024-03-23 NOTE — PLAN OF CARE
Caron is resting well. Patient told this RN, \"I have been able to fall asleep the past 2 nights, then the same bad dream wakes me up.\" This RN talked with her about being in the hospital and some of her time until this point. This RN encouraged her to try and sleep and speak with the Doctor in the am.      Problem: Patient Centered Care  Goal: Patient preferences are identified and integrated in the patient's plan of care  Description: Interventions:  - What would you like us to know as we care for you?   - Provide timely, complete, and accurate information to patient/family  - Incorporate patient and family knowledge, values, beliefs, and cultural backgrounds into the planning and delivery of care  - Encourage patient/family to participate in care and decision-making at the level they choose  - Honor patient and family perspectives and choices  Outcome: Progressing     Problem: RESPIRATORY - ADULT  Goal: Achieves optimal ventilation and oxygenation  Description: INTERVENTIONS:  - Assess for changes in respiratory status  - Assess for changes in mentation and behavior  - Position to facilitate oxygenation and minimize respiratory effort  - Oxygen supplementation based on oxygen saturation or ABGs  - Provide Smoking Cessation handout, if applicable  - Encourage broncho-pulmonary hygiene including cough, deep breathe, Incentive Spirometry  - Assess the need for suctioning and perform as needed  - Assess and instruct to report SOB or any respiratory difficulty  - Respiratory Therapy support as indicated  - Manage/alleviate anxiety  - Monitor for signs/symptoms of CO2 retention  Outcome: Progressing     Problem: NEUROLOGICAL - ADULT  Goal: Achieves stable or improved neurological status  Description: INTERVENTIONS  - Assess for and report changes in neurological status  - Initiate measures to prevent increased intracranial pressure  - Maintain blood pressure and fluid volume within ordered parameters to optimize  cerebral perfusion and minimize risk of hemorrhage  - Monitor temperature, glucose, and sodium. Initiate appropriate interventions as ordered  Outcome: Progressing  Goal: Achieves maximal functionality and self care  Description: INTERVENTIONS  - Monitor swallowing and airway patency with patient fatigue and changes in neurological status  - Encourage and assist patient to increase activity and self care with guidance from PT/OT  - Encourage visually impaired, hearing impaired and aphasic patients to use assistive/communication devices  Outcome: Progressing

## 2024-03-23 NOTE — PLAN OF CARE
Patient alert and oriented x4. Up with standby assist. General cardiac diet, tolerating well. Weaned from oxygen this afternoon. Voiding well. Pain controlled with PRN mortrin. Worked with PT today on stairs, tolerated well. Call light in reach. Frequent rounding performed.     Problem: Patient Centered Care  Goal: Patient preferences are identified and integrated in the patient's plan of care  Description: Interventions:  - What would you like us to know as we care for you? My family is very supportive and involved  - Provide timely, complete, and accurate information to patient/family  - Incorporate patient and family knowledge, values, beliefs, and cultural backgrounds into the planning and delivery of care  - Encourage patient/family to participate in care and decision-making at the level they choose  - Honor patient and family perspectives and choices  Outcome: Not Progressing     Problem: RESPIRATORY - ADULT  Goal: Achieves optimal ventilation and oxygenation  Description: INTERVENTIONS:  - Assess for changes in respiratory status  - Assess for changes in mentation and behavior  - Position to facilitate oxygenation and minimize respiratory effort  - Oxygen supplementation based on oxygen saturation or ABGs  - Provide Smoking Cessation handout, if applicable  - Encourage broncho-pulmonary hygiene including cough, deep breathe, Incentive Spirometry  - Assess the need for suctioning and perform as needed  - Assess and instruct to report SOB or any respiratory difficulty  - Respiratory Therapy support as indicated  - Manage/alleviate anxiety  - Monitor for signs/symptoms of CO2 retention  Outcome: Not Progressing     Problem: NEUROLOGICAL - ADULT  Goal: Achieves stable or improved neurological status  Description: INTERVENTIONS  - Assess for and report changes in neurological status  - Initiate measures to prevent increased intracranial pressure  - Maintain blood pressure and fluid volume within ordered parameters  to optimize cerebral perfusion and minimize risk of hemorrhage  - Monitor temperature, glucose, and sodium. Initiate appropriate interventions as ordered  Outcome: Not Progressing  Goal: Achieves maximal functionality and self care  Description: INTERVENTIONS  - Monitor swallowing and airway patency with patient fatigue and changes in neurological status  - Encourage and assist patient to increase activity and self care with guidance from PT/OT  - Encourage visually impaired, hearing impaired and aphasic patients to use assistive/communication devices  Outcome: Not Progressing

## 2024-03-23 NOTE — PROGRESS NOTES
[facility] Pul[facility], Critical Care [facility] Sleep    Caron Daniels Patient Status:  Inpatient    1960 MRN [id number]   Location [facility] 5SW/SE Attending Marce Araya MD   Hosp Day # 6 PCP MARLA BARNETT     Date of Admission: 3/17/2024  3:18 PM    Admission Diagnosis: Hypercapnia [R06.89]  Influenza [J11.1]  Hypoxia [R09.02]  Dysarthria [R47.1]    S: Feeling better. Was able to walk up stairs without O2.     Scheduled Medications:     potassium chloride  40 mEq Oral Q4H    potassium phosphate dibasic 15 mmol in sodium chloride 0.9% 250 mL IVPB  15 mmol Intravenous Once    Followed by    potassium chloride  20 mEq Intravenous Once    predniSONE  40 mg Oral Daily with breakfast    enoxaparin  40 mg Subcutaneous Daily    atorvastatin  80 mg Oral Nightly    aspirin  81 mg Oral Daily    clopidogrel  75 mg Oral Daily    [Held by provider] furosemide  40 mg Oral Daily    cetirizine  10 mg Oral Daily    pantoprazole  20 mg Oral QAM AC    ipratropium-albuterol  3 mL Nebulization QID       Infusing Medications:      PRN Medications:  ibuprofen, haloperidol lactate, acetaminophen **OR** acetaminophen, labetalol, hydrALAzine, ondansetron, prochlorperazine, ipratropium-albuterol, influenza virus vaccine PF    OBJECTIVE:  BP (!) 145/91 (BP Location: Right arm)   Pulse 85   Temp 98.3 °F (36.8 °C) (Oral)   Resp 18   Ht 165.1 cm (5' 5\")   Wt 172 lb 8 oz (78.2 kg)   SpO2 93%   BMI 28.71 kg/m²    Temp (24hrs), Av.4 °F (36.9 °C), Min:98.3 °F (36.8 °C), Max:98.4 °F (36.9 °C)      FiO2 (%):  [30 %] 30 %      Wt Readings from Last 3 Encounters:   24 172 lb 8 oz (78.2 kg)   19 203 lb (92.1 kg)   17 211 lb 7 oz (95.9 kg)       I/O last 3 completed shifts:  In: 100 [IV PIGGYBACK:100]  Out: 3725 [Urine:3725]  I/O this shift:  In: 300 [P.O.:300]  Out: 400 [Urine:400]     O2: RA  General: NAD.   Neuro: Alert, no focal deficits.    HEENT: PERRL  Neck : No LAD  CV: RRR, nl S1, S2, no S4, S3 or  murmur.   Lungs: Clear bilaterally.   Abd: Nontender, non distended.   Ext: No edema.   Skin: No rashes.     Recent Labs   Lab 03/21/24  0435 03/22/24  0612 03/23/24  0613   WBC 9.4 10.0 8.0   HGB 12.4 13.0 13.2   HCT 40.8 39.9 40.3   .0 220.0 212.0     Recent Labs   Lab 03/21/24  0435 03/22/24  0612 03/23/24  0613   * 112* 85   BUN 14 9 11   CREATSERUM 0.52* 0.53* 0.48*   CA 9.2 8.6* 8.8   * 144 146*   K 4.0 3.6 3.4*  3.4*    103 106   CO2 >40.0* 38.0* 35.0*   AST 12 15 22   ALT <7* 22 21   ALB 3.6 3.3 3.5     No results for input(s): \"INR\", \"PTT\" in the last 168 hours.  Recent Labs   Lab 03/19/24  0920   ABGPHT 7.40   SAEVBJ8Y 94*   JONQG2T 76*   ABGHCO3 45.7*   SITE Right Radial       COVID-19 Lab Results    COVID-19  Lab Results   Component Value Date    COVID19 Not Detected 03/17/2024       Pro-Calcitonin  Recent Labs   Lab 03/17/24  1522   PCT 0.09*       Cardiac  No results for input(s): \"TROP\", \"PBNP\" in the last 168 hours.    Creatinine Kinase  No results for input(s): \"CK\" in the last 168 hours.    Inflammatory Markers  No results for input(s): \"CRP\", \"BLANKA\", \"LDH\", \"DDIMER\" in the last 168 hours.    Imaging:   CT 3/17          Chest images personally reviewed.     Assessment/Plan   Acute on chronic respiratory failure - due to AECOPD, influenza and encephalopathy   - AVAPS when sleeping and as needed  AECOPD  - PO steroids, taper off as outpt.   - scheduled bronchodilators  PNA - RPP positive for influenza  - out of the window for Tamiflu  - completed ceftriaxone and azithromycin  Abnormal CT chest - likely has lung cancer  - outpatient PET-CT.  Tobacco use  - advised cessation  Encephalopathy - due to TME  - supportive care  Dysarthria - CT brain with no acute process  - neurology has seen  NICM  - TTE this admission with normal EF  Ppx  - Lovenox  Dispo  - DNAR/select  - palliative care following  - OK for d/c and f/u with PFT and PET in 4 weeks with Dr. Cassidy.     My best  regards,         Valeriy lAfonso MD  [facility] Pulmonary, Critical Care and Sleep Medicine

## 2024-03-23 NOTE — PROGRESS NOTES
[facility]   INTERNAL MEDICINE PROGRESS NOTE    CHIEF COMPLAINT   Difficulty Breathing    SUBJECTIVE  24 HR EVENTS   Continues to improve, seen with PT and did 8 stairs without issue.  Lowest O2 sat with activity was 87% and bounce back to 89%.  On room air at rest O2 sat stable in the low 90s.  Patient denies any chest pain or shortness of breath with activity.    INPATIENT MEDICATIONS  Scheduled Medications:  potassium phosphate dibasic 15 mmol in sodium chloride 0.9% 250 mL IVPB, 15 mmol, Once   Followed by  potassium chloride, 20 mEq, Once  predniSONE, 40 mg, Daily with breakfast  enoxaparin, 40 mg, Daily  atorvastatin, 80 mg, Nightly  aspirin, 81 mg, Daily  clopidogrel, 75 mg, Daily  [Held by provider] furosemide, 40 mg, Daily  cetirizine, 10 mg, Daily  pantoprazole, 20 mg, QAM AC  ipratropium-albuterol, 3 mL, QID     Drips:         PRN Medications:   ibuprofen, 600 mg, Q6H PRN  haloperidol lactate, 2 mg, Q6H PRN  acetaminophen, 650 mg, Q4H PRN   Or  acetaminophen, 650 mg, Q4H PRN  labetalol, 10 mg, Q10 Min PRN  hydrALAzine, 10 mg, Q2H PRN  ondansetron, 4 mg, Q6H PRN  prochlorperazine, 5 mg, Q8H PRN  ipratropium-albuterol, 3 mL, Q6H PRN  influenza virus vaccine PF, 0.5 mL, Prior to discharge       PHYSICAL EXAMINATION  Vitals: BP (!) 145/91 (BP Location: Right arm)   Pulse 85   Temp 98.3 °F (36.8 °C) (Oral)   Resp 18   Ht 5' 5\" (1.651 m)   Wt 172 lb 8 oz (78.2 kg)   SpO2 93%   BMI 28.71 kg/m²   Gen: Calm, NAD, well nourished, on AVAPs  Eyes: PERRLA, normal conjunctivae  ENMT: Dry mucous membranes, trachea midline  CV: RRR, no peripheral edema  Resp: Minimal air movement, non-labored respirations, symmetric expansion  GI: Soft, NT, ND, no rebound, no guarding  MSK:  No C/C/E, normal active/passive ROM in C-spine  Skin: No rashes  Neuro: Follows commands, moving all 4 extrem to command  Psych: A&Ox3, linear thought process    LABORATORY VALUES   Recent Labs   Lab 03/17/24  1522 03/18/24  0432  03/19/24  0444 03/20/24  0433 03/20/24  1607 03/21/24  0435 03/22/24  0612 03/23/24  0613   * 140  --  148* 149* 147* 144 146*   K 3.3* 4.4   < > 4.1 4.4 4.0 3.6 3.4*  3.4*   CL 90* 94*  --  101 103 104 103 106   CO2 >40.0* >40.0*  --  >40.0* >40.0* >40.0* 38.0* 35.0*   BUN 25* 18  --  18 16 14 9 11   CREATSERUM 0.89 0.77  --  0.56 0.50* 0.52* 0.53* 0.48*   ANIONGAP  --   --   --   --   --   --  3 5   * 151*  --  109* 127* 127* 112* 85   CA 8.3* 8.6*  --  9.4 9.0 9.2 8.6* 8.8   PHOS  --   --   --  1.5*  --  1.8* 1.7* 2.9   TP 6.6 6.5  --  6.3  --  6.0 5.7 5.9   ALB 3.9 3.7  --  3.8  --  3.6 3.3 3.5   AST 38* 27  --  14  --  12 15 22   ALT 14 14  --  8*  --  <7* 22 21   ALKPHO 41* 43*  --  39*  --  36* 32* 36*   BILT <0.2* <0.2*  --  0.2  --  0.2 0.4 0.4   EGFRCR 73 87  --  102 105 104 104 106    < > = values in this interval not displayed.     Recent Labs   Lab 03/17/24  1522 03/18/24  0436 03/20/24  0433 03/21/24  0435 03/22/24  0612 03/23/24  0613   WBC 9.6 7.4 13.0* 9.4 10.0 8.0   HGB 14.1 13.7 12.7 12.4 13.0 13.2   HCT 44.6 44.8 43.2 40.8 39.9 40.3   .0 232.0 250.0 229.0 220.0 212.0   MCV 91.6 93.5 98.6 95.1 91.1 89.0   MOPERCENT 13.5  --   --   --   --   --    EOPERCENT 0.0  --   --   --   --   --    BAPERCENT 0.3  --   --   --   --   --      Recent Labs   Lab 03/17/24  1522 03/17/24  1524   PCT 0.09*  --    COVID19  --  Not Detected   INFAPCR  --  Positive*   INFBPCR  --  Negative   RSV  --  Negative     ASSESSMENT & PLAN  [place] - 64 year old female with NICM s/p AICD, COPD, daily tobacco use, prior etoh abuse, and HTN who presented to the hospital with multiple complaints including shortness of breath, weakness, slurred speech, difficulty ambulating.  Patient found to have acute hypoxemic and hypercapnic respiratory failure.  Placed in the ICU and put on BiPAP.  He was BiPAP required for 72 hours and then wean down.  Now down to room air at rest and lowest O2 sat 87% briefly  with activity.  Anticipate ready for discharge on 3/24/2024.  Patient does not have insurance for home O2 she is Medicaid pending and would prefer not to have oxygen at discharge.     Acute hypoxemic and hypercapnic respiratory failure  - SpO2 59% on RA, pCO2 95 on abg initially, increased to 109  - suspect that she has chronic hypoxemic and hypercapnic respiratory failure in addition to acute insult  - 2/2 due to COPD exacerbation, possible superimposed PNA, will need to r/o PE as well so will get CTA chest  - on 7L NC initially, then required continuous BiPAP ~ 72 hrs  - continuous pulse ox  - supplemental O2 to maintain SpO2 88-92%  - pulm colleagues consulted, appreciate recs  - improved, off BiPAP  -Did managed to get to room air at rest.  Will do walk O2 on day of discharge to ensure O2 sats are remaining 87 and above with activity.     COPD exacerbation  Influenza A infection  Presumed bacterial pneumonia  - CTA chest with no PE  - onset of influenza symptoms 5 days prior, out of window for tamiflu  - IV solumedrol 60 BID-> now on p.o. prednisone  - duonebs q 4 WA and q 6 PRN  - start azithromycin/rocephin emperically but since stopped, patient continues to improve  - mucinex 600 BID  - Pulm consutled - IPPV as above     TME-> dissolved  - ddx includes metabolic encephalopathy from hypercapnia, - does have a hx of significant etoh use, wernickes? But states quit > 8 years ago and goes to [place] CVA. Seems likely hypercapnea is driving factor  - TSH and B12 normal, B1 levels pending  - UDS + canabis  - CTH in the ED was negative  - hx of ICD, unsure if compatible with MRI but if able MRI brain  - CTA H/N showed no major occlusion or stenosis  - echo   - s/p ASA and plavix in the ED, start ASA daily  - neuro consulted, appreciate recs  - lipitor 80 mg nightly  - hba1c of 6.6  - PT/OT/SW  - High dose thiamine  - Convert meds to PO, close monitoring of resp status  - 3/20: overnight started on precedex, s/p haldol  for delirium  -Patient is markedly improved and back to baseline on 3/23/2024     Hypernatremia  - Likely 2/2 dehydration w/o oral intake  - Improved with PO intake. Stop dextrose gtt 3/22  -Now improved stable off of D5    HTN  - BP acceptable  - no need for rx now     Hx of ischemic cardiomyopathy s/p AICD  - echo now pending  - holding home HF medications for now     Daily tobacco use  - quit smoking cigarettes in 2000 but smokes 5-6 cigars daily  - did not want nicotine patch    Likely lung CA  - Spiculated 3.8 cm right lower lobe pulmonary mass seen on CTA chest in ED  - Seen by pulm  [  ] plan for OP PET - Unfortunately insurance might be prohibitive until medicaid kicks in.     GERD   - Sub PTA PO pantop for IV    Fluids & nutrition  - Now NPO x 48 hrs  - Hold PTA lasix.   - gentle IVF, low rate. Avoid volume overload  - Will need supplemental nutrition pending course    Urinary retention  [  ] trial without cath 3/22 voiding that issue     ACP: DNAR/Selective Treatment  Ppx: DVT: Enox. GI: IV PPI for now  Dispo  EDoD:  ~ 3/23 - 3/24 pending above. . Worked w PT/OT - near baseline.  Possible discharge tomorrow  F/u:   - PCP:  MARLA BARNETT    Available via bubble chat or perfect serve 7A - 7P.    Marce Araya MD   Internal Medicine - Hospitalist  [facility]

## 2024-03-24 VITALS
TEMPERATURE: 98 F | WEIGHT: 172.5 LBS | SYSTOLIC BLOOD PRESSURE: 158 MMHG | BODY MASS INDEX: 28.74 KG/M2 | HEIGHT: 65 IN | RESPIRATION RATE: 20 BRPM | DIASTOLIC BLOOD PRESSURE: 89 MMHG | HEART RATE: 90 BPM | OXYGEN SATURATION: 94 %

## 2024-03-24 LAB
ALBUMIN SERPL-MCNC: 3.7 G/DL (ref 3.2–4.8)
ALBUMIN/GLOB SERPL: 1.5 {RATIO} (ref 1–2)
ALP LIVER SERPL-CCNC: 37 U/L
ALT SERPL-CCNC: 26 U/L
ANION GAP SERPL CALC-SCNC: 6 MMOL/L (ref 0–18)
AST SERPL-CCNC: 39 U/L (ref ?–34)
BILIRUB SERPL-MCNC: 0.4 MG/DL (ref 0.2–1.1)
BUN BLD-MCNC: 10 MG/DL (ref 9–23)
BUN/CREAT SERPL: 17.9 (ref 10–20)
CALCIUM BLD-MCNC: 9.3 MG/DL (ref 8.7–10.4)
CHLORIDE SERPL-SCNC: 109 MMOL/L (ref 98–112)
CO2 SERPL-SCNC: 29 MMOL/L (ref 21–32)
CREAT BLD-MCNC: 0.56 MG/DL
DEPRECATED RDW RBC AUTO: 43.4 FL (ref 35.1–46.3)
EGFRCR SERPLBLD CKD-EPI 2021: 102 ML/MIN/1.73M2 (ref 60–?)
ERYTHROCYTE [DISTWIDTH] IN BLOOD BY AUTOMATED COUNT: 13.4 % (ref 11–15)
GLOBULIN PLAS-MCNC: 2.5 G/DL (ref 2.8–4.4)
GLUCOSE BLD-MCNC: 94 MG/DL (ref 70–99)
HCT VFR BLD AUTO: 41.6 %
HGB BLD-MCNC: 13.7 G/DL
MCH RBC QN AUTO: 29.4 PG (ref 26–34)
MCHC RBC AUTO-ENTMCNC: 32.9 G/DL (ref 31–37)
MCV RBC AUTO: 89.3 FL
OSMOLALITY SERPL CALC.SUM OF ELEC: 297 MOSM/KG (ref 275–295)
PLATELET # BLD AUTO: 222 10(3)UL (ref 150–450)
POTASSIUM SERPL-SCNC: 4.3 MMOL/L (ref 3.5–5.1)
PROT SERPL-MCNC: 6.2 G/DL (ref 5.7–8.2)
RBC # BLD AUTO: 4.66 X10(6)UL
SODIUM SERPL-SCNC: 144 MMOL/L (ref 136–145)
WBC # BLD AUTO: 9.5 X10(3) UL (ref 4–11)

## 2024-03-24 RX ORDER — ASPIRIN 81 MG/1
81 TABLET, CHEWABLE ORAL DAILY
Qty: 30 TABLET | Refills: 0 | Status: SHIPPED | OUTPATIENT
Start: 2024-03-25

## 2024-03-24 RX ORDER — ATORVASTATIN CALCIUM 40 MG/1
40 TABLET, FILM COATED ORAL NIGHTLY
Qty: 30 TABLET | Refills: 0 | Status: SHIPPED | OUTPATIENT
Start: 2024-03-24

## 2024-03-24 NOTE — DISCHARGE SUMMARY
[facility]  part of [facility] Internal Medicine Discharge Summary   Patient ID:  Caron Daniels  [id number]  64 year old  2/22/1960    Admit date: 3/17/2024    Discharge date and time:  3/24/24    Attending Physician: Marce Araya MD     Primary Care Physician: MARLA BARNETT     Reason for admission influenza , hypercapnic respiratory failure (see HPI on HP for further detail)    Discharged Condition: stable    Disposition: home    Risk of Readmission Lace+ Score: 58  59-90 High Risk  29-58 Medium Risk  0-28   Low Risk    Important follow up:  Please get PET scan and PFT in 3-4 weeks.  ..    Please get sleep study in 4 weeks when medicaid is approved     Need a oncologist once medicaid is approved.  ..    PCP, Pulm and Oncology follow up     Discharge meds  I reviewed and reconciled current and discharge medications on the day of discharge with the changes reflected below.       Medication List        START taking these medications      aspirin 81 MG Chew  Chew 1 tablet (81 mg total) by mouth daily.  Start taking on: March 25, 2024     atorvastatin 40 MG Tabs  Commonly known as: Lipitor  Take 1 tablet (40 mg total) by mouth nightly.     ipratropium-albuterol 0.5-2.5 (3) MG/3ML Soln  Commonly known as: Duoneb  Take 3 mL by nebulization every 6 (six) hours as needed.     predniSONE 20 MG Tabs  Commonly known as: Deltasone  Take 2 tablets (40 mg total) by mouth daily with breakfast for 4 days, THEN 1 tablet (20 mg total) daily with breakfast for 4 days, THEN 0.5 tablets (10 mg total) daily with breakfast for 4 days.  Start taking on: March 24, 2024            CONTINUE taking these medications      albuterol 108 (90 Base) MCG/ACT Aers  Commonly known as: Proventil HFA  Inhale 1 puff into the lungs every 4 (four) hours as needed for Wheezing.     carvedilol 6.25 MG Tabs  Commonly known as: Coreg     enalapril 2.5 MG Tabs  Commonly known as: Vasotec     furosemide 40 MG  Tabs  Commonly known as: Lasix     loratadine 10 MG Tabs  Commonly known as: Claritin     omeprazole 20 MG Cpdr  Commonly known as: PriLOSEC     potassium chloride 10 MEQ Tbcr  Commonly known as: K-Dur               Where to Get Your Medications        These medications were sent to [id number] - [place], [place] - [address] AT [facility] [address], [phone], [phone]  [address], [facility] [address]      Phone: [phone]   atorvastatin 40 MG Tabs  ipratropium-albuterol 0.5-2.5 (3) MG/3ML Soln  predniSONE 20 MG Tabs       You can get these medications from any pharmacy    Bring a paper prescription for each of these medications  aspirin 81 MG Chew       HPI per chart   Patient is a 64 year old female with PMH sig for nonischemic cardiomyopathy s/p AICD, COPD, daily tobacco use, prior etoh abuse, and HTN who presented to the hospital with multiple complaints including shortness of breath, weakness, slurred speech, difficulty ambulating. Patient is very tangential when giving details of her medical history in addition to HPI, and therefore it was extremely difficult to collect. Reportedly patients  started to have flu like symptoms 7 days prior. At first patient felt okay but then she started to develop the same symptoms about 2 days later. This included worsening shortness of breath, fevers ( t max of 100.6), congestion, weakness. She normally has a chronic cough with clear sputum production but then started to notice that her sputum was turning brown. As these symptoms were progressively worsening she also started to feel more confused and sleepy. She felt like she was having difficulty with fine motor tasks including the inability to hold a cup or cutlery. She has a \" minor\" tremor at BL, but this tremor was exacerbated. She has been mostly sleeping over the past week and when she did get up yesterday to go to the cough she says that she was having  difficulty walking and felt very off balance. No numbness or tingling. No CP, abdominal pain, nausea, vomiting, diarrhea. Today she told her  that she felt as if she had a stroke and requested that EMS be called. Upon arrival, EMS noted that she was hypoxemic with an SpO2 of 59% on RA. She was started on a 15L NRB and brought to the ED.      Upon arrival, she had a minor T of 99.2. Labwork was remarkable for a PCO2 of 95 on ABG with a pH of 7.38. HCO3 > 40, , no leukocytosis. She was given IV steroids and nebulizer treatment and admitted for further medical management.  Hospital Course  Caron Daniels - 64 year old female with NICM s/p AICD, COPD, daily tobacco use, prior etoh abuse, and HTN who presented to the hospital with multiple complaints including shortness of breath, weakness, slurred speech, difficulty ambulating.  Patient found to have acute hypoxemic and hypercapnic respiratory failure.  Placed in the ICU and put on BiPAP.  He was BiPAP required for 72 hours and then wean down.  Now down to room air at rest and lowest O2 sat 87% briefly with activity.  Anticipate ready for discharge on 3/24/2024.  Patient does not have insurance for home O2 she is Medicaid pending and no oxygen prior to discharge.  Cleared by pulm for discharge.  Discharge Diagnoses:   Acute hypoxemic and hypercapnic respiratory failure  - SpO2 59% on RA, pCO2 95 on abg initially, increased to 109  - suspect that she has chronic hypoxemic and hypercapnic respiratory failure in addition to acute insult  - 2/2 due to COPD exacerbation, possible superimposed PNA, will need to r/o PE as well so will get CTA chest  - on 7L NC initially, then required continuous BiPAP ~ 72 hrs  - pulm colleagues consulted, appreciate recs  - improved, off BiPAP, weaned off O2   -did well off O2 overnight, walked with PT wihtout issue      COPD exacerbation  Influenza A infection  Presumed bacterial pneumonia  - CTA chest with no PE  - onset of  influenza symptoms 5 days prior, out of window for tamiflu  - IV solumedrol 60 BID-> now on p.o. prednisone  - duonebs q 4 WA and q 6 PRN  - start azithromycin/rocephin emperically but since stopped, patient continues to improve  - mucinex 600 BID  - Pulm consutled - ok for dc      TME->resolved   - ddx includes metabolic encephalopathy from hypercapnia, - does have a hx of significant etoh use, wernickes? But states quit > 8 years ago and goes to [facility] CVA. Seems likely hypercapnea is driving factor  - TSH and B12 normal, B1 levels pending  - UDS + canabis  - CTH in the ED was negative  - hx of ICD, unsure if compatible with MRI but if able MRI brain  - CTA H/N showed no major occlusion or stenosis  - echo   - s/p ASA and plavix in the ED, start ASA daily  - neuro consulted, appreciate recs  - lipitor 80 mg nightly  - hba1c of 6.6  - PT/OT/SW  - High dose thiamine  - Convert meds to PO, close monitoring of resp status  - 3/20: overnight started on precedex, s/p haldol for delirium  -Patient is markedly improved and back to baseline on 3/23/2024     Hypernatremia  - Likely 2/2 dehydration w/o oral intake  - Improved with PO intake. Stop dextrose gtt 3/22  -Now improved stable off of D5     HTN  - BP acceptable  - no need for rx now     Hx of ischemic cardiomyopathy s/p AICD  - echo now pending  - holding home HF medications for now     Daily tobacco use  - quit smoking cigarettes in 2000 but smokes 5-6 cigars daily  - did not want nicotine patch     Likely lung CA  - Spiculated 3.8 cm right lower lobe pulmonary mass seen on CTA chest in ED  - Seen by pulm  [  ] plan for OP PET - Unfortunately insurance might be prohibitive until medicaid kicks in.      GERD   - Sub PTA PO pantop for IV       Consults: IP CONSULT TO SOCIAL WORK  IP CONSULT PALLIATIVE CARE  IP CONSULT TO SPIRITUAL CARE    Radiology: CT BRAIN OR HEAD ([id number])    Result Date: 3/19/2024  PROCEDURE: CT BRAIN OR HEAD (CPT=70450)  COMPARISON: [place]  [facility], CT BRAIN OR HEAD (CPT=70450), 3/17/2024, 4:39 PM.  [facility], CTA BRAIN + CTA CAROTIDS (CPT=70496/71723), 3/17/2024, 10:41 PM.  INDICATIONS: Encephalopthy of unclear cause  TECHNIQUE: CT images were obtained without contrast material.  Automated exposure control for dose reduction was used.  Dose information is transmitted to the ACR (American College of Radiology) NRDR (National Radiology Data Registry) which includes the Dose Index Registry.  FINDINGS:  CSF SPACES: No hydrocephalus, subarachnoid hemorrhage, or effacement of the basal cisterns is appreciated. There is no extra-axial fluid collection. CEREBRUM: No acute intraparenchymal hemorrhage, edema, or cortical sulcal effacement is apparent. There is no space-occupying lesion, mass effect, or shift of midline structures. The gray-white matter junction is preserved and bilaterally symmetric in appearance. CEREBELLUM: No edema, hemorrhage, mass, or acute infarction is seen. BRAINSTEM: No edema, hemorrhage, mass, or acute infarction is seen.  CALVARIUM: There is no apparent depressed fracture, mass, or other significant visible lesion.  SINUSES: Bilateral maxillary sinus mucosal thickening. ORBITS: Limited views are grossly unremarkable.  OTHER: Atherosclerotic vascular calcifications are perceived in the carotid siphons.         CONCLUSION:  1. No acute intracranial process by noncontrast CT technique. 2. Intracranial atherosclerosis. 3. Dependent left greater than right maxillary sinus mucosal thickening. 4. Lesser incidental findings as above.   [facility]  Dictated by (CST): Neftali El MD on 3/19/2024 at 11:45 AM     Finalized by (CST): Neftali El MD on 3/19/2024 at 11:47 AM          CARD ECHO 2D W DOPPLER/BUBBLES (CPT=93306)    Result Date: 3/18/2024  Transthoracic Echocardiogram Name:Caron Daniels Date: 2024 :  1960 Ht:  (65in)  BP: 114 / 69 MRN:  [id number]    Age:  64years    Wt:  (176lb) HR:  74bpm Loc:  [facility]       Gndr: F          BSA: 1.87m^2 Sonographer: LASHAWN Templeton Ordering:    Jessica Reyes Consulting:  Ana Estrada ---------------------------------------------------------------------------- History/Indications:  Implantable Cardioverter-Defibrillator. Cerebrovascular accident. Nonischemic cardiomyopathy. ETOH abuse.  Risk factors:  Hypertension. ---------------------------------------------------------------------------- Procedure information:  A transthoracic complete 2D study was performed. Additional evaluation included M-mode, complete spectral Doppler, and color Doppler.  Patient status:  Inpatient.  Location:  Bedside.    No prior study was available for comparison.    This was a routine study. Transthoracic echocardiography for diagnosis and ventricular function evaluation. Image quality was adequate. Intravenous contrast (agitated saline) was administered to identify shunting. ECG rhythm:   Normal sinus ---------------------------------------------------------------------------- Conclusions: 1. Atrial septum: Agitated saline contrast study showed no shunt, at    baseline or with provocation. 2. Left ventricle: The cavity size was normal. Wall thickness was normal.    Systolic function was at the lower limits of normal. The estimated    ejection fraction was 50-55%, by visual assessment. No diagnostic    evidence for regional wall motion abnormalities. Left ventricular    diastolic function is indeterminate. 3. Right ventricle: Pacer C/W ICD noted in the right ventricle. 4. Left atrium: The left atrial volume was mildly increased. 5. Right atrium: Pacer C/W ICD noted in right atrium. 6. Pulmonary arteries: Systolic pressure was mildly increased, estimated to    be 43mm Hg. Impressions:  No previous study was available for comparison. * ---------------------------------------------------------------------------- * Findings: Left ventricle:  The cavity size was normal. Wall  thickness was normal. Systolic function was at the lower limits of normal. The estimated ejection fraction was 50-55%, by visual assessment. No diagnostic evidence for regional wall motion abnormalities. Left ventricular diastolic function is indeterminate. Left atrium:  The left atrial volume was mildly increased. Right ventricle:  The cavity size was normal. Pacer C/W ICD noted in the right ventricle. Systolic function was normal. Right atrium:  The atrium was normal in size. Pacer C/W ICD noted in right atrium. Mitral valve:  The valve was structurally normal. Leaflet separation was normal.  Doppler:  Transvalvular velocity was within the normal range. There was no evidence for stenosis. There was trivial regurgitation. Aortic valve:  The valve was structurally normal. The valve was trileaflet. Cusp separation was normal.  Doppler:  Transvalvular velocity was within the normal range. There was no evidence for stenosis. There was no significant regurgitation. Tricuspid valve:  The valve is structurally normal. Leaflet separation was normal.  Doppler:  Transvalvular velocity was within the normal range. There was no evidence for stenosis. There was mild regurgitation. Pulmonic valve:   The valve is structurally normal. Cusp separation was normal.  Doppler:  Transvalvular velocity was within the normal range. There was no evidence for stenosis. There was no regurgitation. Pericardium:   There was no pericardial effusion. Aorta: Aortic root: The aortic root was normal. Pulmonary arteries: Systolic pressure was mildly increased, estimated to be 43mm Hg. Systemic veins:  Central venous respirophasic diameter changes are blunted (< 50%). Inferior vena cava: The IVC was dilated. Atrial septum:   Agitated saline contrast study showed no shunt, at baseline or with provocation. ---------------------------------------------------------------------------- Measurements  Left ventricle                    Value        Ref  IVS  thickness, ED, PLAX           0.8   cm     0.6 -                                                 0.9  LV ID, ED, PLAX               (H) 5.7   cm     3.8 -                                                 5.2  LV ID, ES, PLAX               (H) 4.1   cm     2.2 -                                                 3.5  LV PW thickness, ED, PLAX         0.7   cm     0.6 -                                                 0.9  IVS/LV PW ratio, ED, PLAX         1.14         --------  LV PW/LV ID ratio, ED, PLAX       0.12         --------  LV ejection fraction              54    %      54 - 74  Stroke volume/bsa, 2D             50    ml/m^2 --------  LV e', lateral                    10.7  cm/sec >=10.0  LV E/e', lateral                  7            <=13  LV e', medial                     8.7   cm/sec >=7.0  LV E/e', medial                   9            --------  LV e', average                    9.7   cm/sec --------  LV E/e', average                  8            <=14  LVOT                              Value        Ref  LVOT ID                           2.1   cm     --------  LVOT peak velocity, S             1.25  m/sec  --------  LVOT VTI, S                       26.8  cm     --------  LVOT peak gradient, S             6     mm Hg  --------  LVOT mean gradient, S             3     mm Hg  --------  Stroke volume (SV), LVOT DP       93    ml     --------  Stroke index (SV/bsa), LVOT       50    ml/m^2 --------  DP  Aortic root                       Value        Ref  Aortic root ID                    2.8   cm     2.6 -                                                 4.0  Left atrium                       Value        Ref  LA ID, A-P, ES                    3.8   cm     2.7 -                                                 3.8  LA volume, S                  (H) 71    ml     22 - 52  LA volume/bsa, S              (H) 38    ml/m^2 16 - 34  LA volume, ES, 1-p A4C        (H) 63    ml     22 - 52  LA volume, ES, 1-p A2C        (H)  69    ml     22 - 52  LA volume, ES, A/L                77    ml     --------  LA volume/bsa, ES, A/L        (H) 41    ml/m^2 16 - 34  LA/aortic root ratio              1.36         --------  Mitral valve                      Value        Ref  Mitral E-wave peak velocity       0.78  m/sec  --------  Mitral A-wave peak velocity       0.95  m/sec  --------  Mitral peak gradient, D           2     mm Hg  --------  Mitral E/A ratio, peak            0.8          --------  Pulmonary artery                  Value        Ref  PA pressure, S, DP                43    mm Hg  --------  Tricuspid valve                   Value        Ref  Tricuspid regurg peak             2.66  m/sec  <=2.8  velocity  Tricuspid peak RV-RA gradient     28    mm Hg  --------  Systemic veins                    Value        Ref  Estimated CVP                     15    mm Hg  --------  Inferior vena cava                Value        Ref  ID                            (H) 2.3   cm     <=2.1  Right ventricle                   Value        Ref  TAPSE, MM                         2.26  cm     >=1.70  RV pressure, S, DP                43    mm Hg  --------  RV s', lateral                    10.6  cm/sec >=9.5 Legend: (L)  and  (H)  shawnee values outside specified reference range. ---------------------------------------------------------------------------- Prepared and electronically signed by Edin Godwin 03/18/2024 11:47     CTA BRAIN + CTA CAROTIDS (CPT=70496/26708)    Result Date: 3/18/2024  PROCEDURE: CTA BRAIN + CTA CAROTIDS (CPT=70496/49049)  COMPARISON: [facility], CT BRAIN OR HEAD (CPT=70450), 3/17/2024, 4:39 PM.  INDICATIONS: Dysarthria, right upper extremity weakness.  TECHNIQUE: CT images of the neck and brain were obtained with non-ionic intravenous contrast material. Multi-planar reformatted images were created. Automated exposure control for dose reduction was used. Evaluation of internal carotid stenosis is based on NASCET  Criteria.     No 3D volume rendered reformatted images were created.  FINDINGS: HEAD CT:  CSF SPACES: The ventricles, cisterns, and sulci are commensurate in caliber and appropriate for age. No hydrocephalus, subarachnoid hemorrhage, or effacement of the basal cisterns is appreciated. There is no extra-axial fluid collection. CEREBRUM: No acute intraparenchymal hemorrhage, edema, or cortical sulcal effacement is apparent. There is no space-occupying lesion, mass effect, or shift of midline structures. The gray-white matter junction is preserved and bilaterally symmetric in appearance. CEREBELLUM: No edema, hemorrhage, mass, acute infarction, or significant atrophy.  BRAINSTEM: No edema, hemorrhage, mass, acute infarction, or significant atrophy.  CALVARIUM: There is no apparent depressed fracture, mass, or other significant visible lesion.  SINUSES: There is stable moderate mucosal thickening in the bilateral maxillary sinuses. ORBITS: No gross abnormalities.  OTHER: Negative.    CT ANGIOGRAPHY OF THE NECK:  CAROTID ARTERIES: RIGHT COMMON CAROTID: Mild calcific atherosclerosis.  No hemodynamically significant stenosis or dissection. RIGHT INTERNAL CAROTID: Mild calcific atherosclerosis involving the carotid bifurcation.  No hemodynamically significant stenosis or dissection.  LEFT COMMON CAROTID: Mild calcific atherosclerosis.  No hemodynamically significant stenosis or dissection. LEFT INTERNAL CAROTID: No hemodynamically significant stenosis or dissection.  VERTEBRAL ARTERIES: RIGHT VERTEBRAL ARTERY: No hemodynamically significant stenosis or dissection. LEFT VERTEBRAL ARTERY: The left vertebral artery originates directly from the aortic arch.  No hemodynamically significant stenosis or dissection.  AORTIC ARCH (Limited): No aneurysm or dissection is identified in the imaged volume.  Mild calcific atherosclerosis. MEDIASTINUM/APICES (Limited): No mass or adenopathy.  Mild emphysematous changes at the visualized  lung apices. OTHER: Partially imaged left sided cardiac pacemaker.  There are mild spondylotic changes in the cervical spine.  Bilateral palatine tonsilloliths.   CT ANGIOGRAPHY OF THE BRAIN:  ANTERIOR CIRCULATION: DISTAL INTERNAL CAROTIDS: Flow identified.  Calcific atherosclerosis bilaterally.  No significant stenosis. ANTERIOR CEREBRALS: Flow identified. No significant stenosis. MIDDLE CEREBRALS: Flow identified. No significant stenosis.  POSTERIOR CIRCULATION: RIGHT VERTEBRAL: Flow identified. No significant stenosis. LEFT VERTEBRAL: Flow identified. No significant stenosis. BASILAR: Flow identified. No significant stenosis. POSTERIOR CEREBRALS: Flow identified. No significant stenosis.  ANEURYSMS: None. VASCULAR MALFORMATIONS: None. OTHER: Negative.         CONCLUSION:  1. No flow limiting stenosis or occlusion of the major cervical/intracranial arteries. 2. Mild calcific atherosclerosis involving the right carotid bifurcation resulting in a less than 50% luminal stenosis. 3. 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 4. Lesser incidental findings as above.   A preliminary report was issued by the [employer] teleradiology service. There are no major discrepancies.  [facility]  Dictated by (CST): Sarkis Steve MD on 3/18/2024 at 8:37 AM     Finalized by (CST): Sarkis Steve MD on 3/18/2024 at 8:44 AM          CT CHEST PE AORTA (IV ONLY) (CPT=71260)    Result Date: 3/17/2024  PROCEDURE: CT CHEST PE AORTA (IV ONLY) (CPT=71260)  COMPARISON: [facility], XR CHEST AP PORTABLE (CPT=71045), 3/17/2024, 4:00 PM.  INDICATIONS: Shortness of breath.  TECHNIQUE: CT images of the chest were obtained with non-ionic intravenous contrast material.  Automated exposure control for dose reduction was used. Adjustment of the mA and/or kV was done based on the patient's size. Use of iterative reconstruction technique for dose reduction was used.  Dose information is transmitted to the ACR (American College of Radiology) NRDR (National Radiology Data Registry) which includes the Dose Index Registry.  FINDINGS:  PULMONARY VASCULATURE: There is adequate opacification of the pulmonary arterial tree. No suspicious filling defects are identified in the main, lobar, or segmental pulmonary artery branches to suggest acute pulmonary embolism.   The subsegmental branches are less well assessed. The main pulmonary artery trunk is normal in caliber.  CARDIAC: The heart is mildly enlarged. There is no bowing of the interventricular septum to suggest right ventricular strain.  THORACIC AORTA: There is no ectasia of the ascending thoracic aorta, without aneurysmal dilation.  MEDIASTINUM/KAVON: There is a 1.1 cm right hilar lymph node (3/60).  There is a 0.9 cm left hilar lymph node (3/40).  1.1 cm subcarinal lymph node (3/50).  LUNGS/PLEURA: The trachea and central bronchi are clear.  There is diffuse bronchial wall thickening, which can be seen in acute or chronic bronchitis.  There is a 3.8 x 2.4 cm spiculated mass in the right lower lobe, corresponding to a right basilar opacity on chest radiograph (series 4, image 93). There are other multiple pulmonary nodules as follows , most of which are spiculated: Left apex posteriorly measuring 4 mm (4/3). Right lower lobe posteriorly measuring 1.3 cm (4/71). Right upper lobe anteriorly measuring 1.5 cm (series 4, image 66). Ill-defined ground-glass opacity reticulation measuring 2.1 x 1.9 cm (4/49)  CHEST WALL: No thyroidal mass, within the limitation of artifact. No axillary lymphadenopathy.  There is a left chest wall pacemaker/ICD, with leads terminating in the right atrium and ventricle.  LIMITED ABDOMEN: Within the parameters of the arterial phase of contrast-enhancement, the included upper abdomen is unremarkable  BONES: There is multilevel degenerative disease of the spine.         CONCLUSION:   No evidence of acute  pulmonary embolism to the level of the segmental pulmonary artery branches  Spiculated 3.8 cm right lower lobe pulmonary mass.  This is concerning for a primary lung neoplasia, with infection being considered less likely.  Recommend further assessment with tissue sampling.  Multiple additional pulmonary nodules, which of which are spiculated measuring up to 1.5 cm, concerning for sites sites of pulmonary metastatic disease.  A 2.1 cm ground-glass and reticular opacity of the right lower lobe may be infectious or inflammatory in etiology, with primary lung neoplasia being within the differential.  Further assessment with tissue sampling and/or FDG PET-CT would be helpful.  Bilateral hilar and subcarinal lymph nodes measuring up to 1.1 cm.  These are concerning for waqar metastatic disease.  Reactive etiologies a differential consideration.      Dictated by (CST): Nicky Rincon MD on 3/17/2024 at 7:25 PM     Finalized by (CST): Nicky Rincon MD on 3/17/2024 at 7:38 PM          CT BRAIN OR HEAD (42702)    Result Date: 3/17/2024  PROCEDURE: CT BRAIN OR HEAD (CPT=70450)  COMPARISON: None.  INDICATIONS: Distractable, delayed speech, right upper extremity weakness.  TECHNIQUE: CT images were obtained without contrast material.  Automated exposure control for dose reduction was used.  Dose information is transmitted to the ACR (American College of Radiology) NRDR (National Radiology Data Registry) which includes the Dose Index Registry.  FINDINGS:  CSF SPACES:  Ventricles and sulci are nonenlarged for patient age.  CEREBRUM: There is no focal intracranial hemorrhage identified.  There is no mass effect or midline shift.  There is no large transcortical territorial loss of gray-white matter differentiation.  CEREBELLUM: Assessment is limited by streak artifact in this region.  There is no mass effect or focal hemorrhage identified.  BRAINSTEM: No hemorrhage or mass effect.  CALVARIUM: Intact.  SINUSES:  Changes of  prior endoscopic sinus surgery partially imaged.  There is moderate left greater than right maxillary sinus thickening, which can be seen in setting of chronic sinusitis.  MASTOIDS:  Unremarkable.  ORBITS:  Unremarkable.  OTHER:  Calcific atherosclerosis of the intracranial vasculature.           CONCLUSION:   No acute intracranial hemorrhage, hydrocephalus, or mass effect.  If there is clinical concern for acute ischemic stroke, MRI of the brain is recommended.    Dictated by (CST): Nicky Rincon MD on 3/17/2024 at 5:18 PM     Finalized by (CST): Nicky Rincon MD on 3/17/2024 at 5:23 PM          XR CHEST AP PORTABLE  (CPT=71045)    Result Date: 3/17/2024  PROCEDURE: XR CHEST AP PORTABLE  (CPT=71045) TIME: 4:03 p.m..   COMPARISON: [facility], XR CHEST PA + LAT CHEST (KXB=[id number]), 3/13/2017, 12:15 PM.  INDICATIONS: Weakness and shortness of breath today.  TECHNIQUE:   Single view.   FINDINGS: CARDIAC/VASC: Borderline cardiomegaly with prominence of the pulmonary vessels. ICD projects over the left chest and ICD lead projects in the right ventricle. MEDIAST/KAVON: No visible mass or adenopathy. LUNGS/PLEURA: There is bibasilar alveolar opacity present.  The pleural spaces are clear. BONES: No fracture or visible bony lesion. OTHER: Negative.           CONCLUSION:  Pulmonary vascular congestion, without overt pulmonary edema.  Bibasilar airspace opacities, which may be secondary to subsegmental atelectasis, with differential of alveolar edema or infection, in the appropriate clinical setting.    Dictated by (CST): Nicky Rincon MD on 3/17/2024 at 4:17 PM     Finalized by (CST): Nicky Rincon MD on 3/17/2024 at 4:19 PM             Operative Procedures:      Day of discharge no complaints doing well, family got her nebulizer machine off of [facility].    Exam  Vitals:    03/24/24 0758   BP: 158/89   Pulse: 90   Resp: 20   Temp: 98.3 °F (36.8 °C)     No acute distress, alert and orientedx3  Lungs  Clear  Heart Regular  Abdomen Benign    Total Time Coordinating Care: > than 30 minutes  Note: This chart was prepared using voice recognition software and may contain unintended word substitution errors.     Patient had opportunity to ask questions and state understand and agree with therapeutic plan as outlined

## 2024-03-24 NOTE — PLAN OF CARE
Problem: RESPIRATORY - ADULT  Goal: Achieves optimal ventilation and oxygenation  Description: INTERVENTIONS:  - Assess for changes in respiratory status  - Assess for changes in mentation and behavior  - Position to facilitate oxygenation and minimize respiratory effort  - Oxygen supplementation based on oxygen saturation or ABGs  - Provide Smoking Cessation handout, if applicable  - Encourage broncho-pulmonary hygiene including cough, deep breathe, Incentive Spirometry  - Assess the need for suctioning and perform as needed  - Assess and instruct to report SOB or any respiratory difficulty  - Respiratory Therapy support as indicated  - Manage/alleviate anxiety  - Monitor for signs/symptoms of CO2 retention  Outcome: Progressing     Problem: NEUROLOGICAL - ADULT  Goal: Achieves stable or improved neurological status  Description: INTERVENTIONS  - Assess for and report changes in neurological status  - Initiate measures to prevent increased intracranial pressure  - Maintain blood pressure and fluid volume within ordered parameters to optimize cerebral perfusion and minimize risk of hemorrhage  - Monitor temperature, glucose, and sodium. Initiate appropriate interventions as ordered  Outcome: Progressing     Problem: Patient Centered Care  Goal: Patient preferences are identified and integrated in the patient's plan of care  Description: Interventions:  - What would you like us to know as we care for you? My family is very supportive and involved  - Provide timely, complete, and accurate information to patient/family  - Incorporate patient and family knowledge, values, beliefs, and cultural backgrounds into the planning and delivery of care  - Encourage patient/family to participate in care and decision-making at the level they choose  - Honor patient and family perspectives and choices  Outcome: Not Progressing     Problem: NEUROLOGICAL - ADULT  Goal: Achieves maximal functionality and self care  Description:  INTERVENTIONS  - Monitor swallowing and airway patency with patient fatigue and changes in neurological status  - Encourage and assist patient to increase activity and self care with guidance from PT/OT  - Encourage visually impaired, hearing impaired and aphasic patients to use assistive/communication devices  Outcome: Not Progressing

## 2024-03-24 NOTE — CM/SW NOTE
YOU notified by RN that Susanna AGUAYO has questions for YOU. YOU called and LVM for Susanna at [phone] requesting a call back.     154pm  Called and spoke with Susanna. Susanna inquired about Medicaid lilian process. YOU provided information, Susanna expressed understanding.     No further questions or concerns.       Trinh Leonardo, MSW, LSW    [id number]

## 2024-03-24 NOTE — DISCHARGE INSTRUCTIONS
Please get PET scan and PFT in 3-4 weeks.  ..    Please get sleep study in 4 weeks when medicaid is approved     Need a oncologist once medicaid is approved.

## 2024-03-24 NOTE — PLAN OF CARE
Patient discharged to home with transportation provided by patient's son and daughter-in-law. AVS reviewed with patient and family with no additional questions at this time. All patient belongings returned at time of discharge. No peripheral IV access at time of discharge. Nurse  emptied.     Problem: Patient Centered Care  Goal: Patient preferences are identified and integrated in the patient's plan of care  Description: Interventions:  - What would you like us to know as we care for you? My family is very supportive and involved  - Provide timely, complete, and accurate information to patient/family  - Incorporate patient and family knowledge, values, beliefs, and cultural backgrounds into the planning and delivery of care  - Encourage patient/family to participate in care and decision-making at the level they choose  - Honor patient and family perspectives and choices  Outcome: Progressing     Problem: RESPIRATORY - ADULT  Goal: Achieves optimal ventilation and oxygenation  Description: INTERVENTIONS:  - Assess for changes in respiratory status  - Assess for changes in mentation and behavior  - Position to facilitate oxygenation and minimize respiratory effort  - Oxygen supplementation based on oxygen saturation or ABGs  - Provide Smoking Cessation handout, if applicable  - Encourage broncho-pulmonary hygiene including cough, deep breathe, Incentive Spirometry  - Assess the need for suctioning and perform as needed  - Assess and instruct to report SOB or any respiratory difficulty  - Respiratory Therapy support as indicated  - Manage/alleviate anxiety  - Monitor for signs/symptoms of CO2 retention  Outcome: Progressing     Problem: NEUROLOGICAL - ADULT  Goal: Achieves stable or improved neurological status  Description: INTERVENTIONS  - Assess for and report changes in neurological status  - Initiate measures to prevent increased intracranial pressure  - Maintain blood pressure and fluid volume within  ordered parameters to optimize cerebral perfusion and minimize risk of hemorrhage  - Monitor temperature, glucose, and sodium. Initiate appropriate interventions as ordered  Outcome: Progressing  Goal: Achieves maximal functionality and self care  Description: INTERVENTIONS  - Monitor swallowing and airway patency with patient fatigue and changes in neurological status  - Encourage and assist patient to increase activity and self care with guidance from PT/OT  - Encourage visually impaired, hearing impaired and aphasic patients to use assistive/communication devices  Outcome: Progressing

## 2024-03-25 ENCOUNTER — TELEPHONE (OUTPATIENT)
Dept: HEMATOLOGY/ONCOLOGY | Facility: HOSPITAL | Age: 64
End: 2024-03-25

## 2024-03-25 NOTE — TELEPHONE ENCOUNTER
----- Message from Madelyn Boyd RN sent at 3/25/2024  2:12 PM CDT -----  Regarding: New Lung Cancer Consult for Dr. Cole  Hi,    This patient is being referred by a [facility]ist to Dr. Cole for new lung CA. Dr. Cole would like to see her this week for a consult visit. Can you verify her insurance and call/schedule her?     Please offer the following dates/times this week: 30 min time slot is ok  - Tuesday, 3/26 at 1:00 pm  - Thursday, 3/28 at 9:00 am  - Friday, 3/29 at 3:30 pm     Madelyn Mcclain RN

## 2024-03-26 ENCOUNTER — PATIENT OUTREACH (OUTPATIENT)
Dept: CASE MANAGEMENT | Age: 64
End: 2024-03-26

## 2024-03-26 ENCOUNTER — TELEPHONE (OUTPATIENT)
Dept: HEMATOLOGY/ONCOLOGY | Facility: HOSPITAL | Age: 64
End: 2024-03-26

## 2024-03-26 NOTE — PROGRESS NOTES
Neuro/Stroke apt request (discharged 03/24)    Dr Toro Watts  NEUROLOGY  [facility]  [address] [place] [address] [id number] [phone]  Follow up 1 month  Pt declined apt; pt still trying to get insurance prior to making any apts.  Closing encounter

## 2024-03-26 NOTE — TELEPHONE ENCOUNTER
Madelyn I spoke with mitch this morning to make new consult she has no insurance at this time. She is going to apply for medicare disability but that can take several months. I'm coping Magda on this to see if we can maybe get a hardship case for her. raya

## 2024-04-08 ENCOUNTER — OFFICE VISIT (OUTPATIENT)
Dept: HEMATOLOGY/ONCOLOGY | Facility: HOSPITAL | Age: 64
End: 2024-04-08
Attending: INTERNAL MEDICINE
Payer: MEDICAID

## 2024-04-08 VITALS
HEIGHT: 65 IN | HEART RATE: 67 BPM | OXYGEN SATURATION: 94 % | SYSTOLIC BLOOD PRESSURE: 121 MMHG | WEIGHT: 178 LBS | BODY MASS INDEX: 29.66 KG/M2 | TEMPERATURE: 98 F | DIASTOLIC BLOOD PRESSURE: 72 MMHG | RESPIRATION RATE: 18 BRPM

## 2024-04-08 DIAGNOSIS — F10.11 HISTORY OF ETOH ABUSE: ICD-10-CM

## 2024-04-08 DIAGNOSIS — Z87.891 HISTORY OF TOBACCO USE: ICD-10-CM

## 2024-04-08 DIAGNOSIS — R91.8 LUNG MASS: Primary | ICD-10-CM

## 2024-04-08 DIAGNOSIS — Z95.810 ICD (IMPLANTABLE CARDIOVERTER-DEFIBRILLATOR) IN PLACE: ICD-10-CM

## 2024-04-08 PROCEDURE — 99211 OFF/OP EST MAY X REQ PHY/QHP: CPT

## 2024-04-08 NOTE — PROGRESS NOTES
Cancer Center Consultation Note    Patient Name: Caron Daniels   YOB: 1960   Medical Record Number: [id number]   CSN: [id number]   Consulting Physician: Elías Cole MD  Referring Physician(s): Marce Araya MD  Date of Consultation: 4/8/2024     Reason for Consultation:  Lung Mass with pulmonary nodules, [id number]    History of Present Illness:     Caron Daniels is a 64 year old female that was seen today in the Cancer Center for evaluation of the above conditions. Patient has PMH relevant for extensive tobacco use history, nonischemic cardiomyopathy s/p AICD, COPD and prior ETOH use who was hospitalized from 3/17/24 through 3/24/24 for influenza, hypercapnic respiratory failure.  During that hospitalization patient underwent CT chest PE imaging on 3/17/24 which revealed the presence of right lower lobe pulmonary mass concerning for cancer measuring 3.8 x 2.4 cm. She was also noted to have 0.1 cm right hilar nodule, 0.9 cm left hilar nodule as well as 1.1 cm subcarinal lymph node clinically concerning for lymph node disease involvement with lung cancer. There was a 2.1cm groundglass opacity in the right lower lobe which may be infectious/inflammatory or possibly cancer related.  Caron reports feeling improved after the hospitalization.  She has regained the weight she has lost. Patient denies chest pain, dyspnea, nausea, vomiting, abdominal pain, no systemic signs of illness, no reports of voice change, cough or hemoptysis endorsed.    Past Medical History:  Past Medical History:   Diagnosis Date    Cardiomyopathy (HCC)     CHF (congestive heart failure) (HCC)     COPD (chronic obstructive pulmonary disease) (HCC)     Defibrillator discharge        Past Surgical History:  Past Surgical History:   Procedure Laterality Date    CONIZATION CERVIX,KNIFE/LASER  1987    HC INSERT DUAL ELECTRODE PMKR OR ICD  2009       Family Medical History:  Family History   Problem Relation Age of Onset     Bleeding Disorders Neg     DVT/VTE Neg     Cancer Neg        Gyne History:  OB History   No obstetric history on file.       Social History:  Social History     Socioeconomic History    Marital status:      Spouse name: Not on file    Number of children: Not on file    Years of education: Not on file    Highest education level: Not on file   Occupational History    Not on file   Tobacco Use    Smoking status: Former     Packs/day: 3.50     Years: 55.00     Additional pack years: 0.00     Total pack years: 192.50     Types: Cigarettes     Quit date: 2024     Years since quittin.1    Smokeless tobacco: Never   Vaping Use    Vaping Use: Never used   Substance and Sexual Activity    Alcohol use: No     Comment: formerly drank heavily; sober since 15    Drug use: No    Sexual activity: Not on file   Other Topics Concern    Caffeine Concern Not Asked    Exercise Not Asked    Seat Belt Not Asked    Special Diet Not Asked    Stress Concern Not Asked    Weight Concern Not Asked   Social History Narrative    Caron is  to [place]. She has 1 adult son. Patient works at a [employer] agency for [employer]. She lives with her  in [place], [place].     Social Determinants of Health     Financial Resource Strain: Not on file   Food Insecurity: No Food Insecurity (3/17/2024)    Food Insecurity     Food Insecurity: Never true   Transportation Needs: No Transportation Needs (3/17/2024)    Transportation Needs     Lack of Transportation: No   Physical Activity: Not on file   Stress: Not on file   Social Connections: Not on file   Housing Stability: Low Risk  (3/17/2024)    Housing Stability     Housing Instability: No     Housing Instability Emergency: Not on file       Allergies:   No Known Allergies    Current Medications:   aspirin 81 MG Oral Chew Tab Chew 1 tablet (81 mg total) by mouth daily. 30 tablet 0    atorvastatin 40 MG Oral Tab Take 1 tablet (40 mg total) by mouth nightly. 30 tablet 0     ipratropium-albuterol 0.5-2.5 (3) MG/3ML Inhalation Solution Take 3 mL by nebulization every 6 (six) hours as needed. 120 each 0    Albuterol Sulfate HFA (PROVENTIL HFA) 108 (90 Base) MCG/ACT Inhalation Aero Soln Inhale 1 puff into the lungs every 4 (four) hours as needed for Wheezing. 1 Inhaler 0    carvedilol 6.25 MG Oral Tab Take 1 tablet (6.25 mg total) by mouth 2 (two) times daily with meals.      furosemide 40 MG Oral Tab Take 1 tablet (40 mg total) by mouth daily.      Enalapril Maleate 2.5 MG Oral Tab Take 1 tablet (2.5 mg total) by mouth 2 (two) times daily.      loratadine 10 MG Oral Tab Take 1 tablet (10 mg total) by mouth daily.      omeprazole 20 MG Oral Capsule Delayed Release Take 1 capsule (20 mg total) by mouth every morning.      Potassium Chloride ER 10 MEQ Oral Tab CR Take 2 tablets (20 mEq total) by mouth daily.         Review of Systems:    Constitutional: Negative for anorexia, chills, fatigue, fevers, night sweats and weight loss.  Eyes: Negative for visual disturbance, irritation and redness.  Respiratory: Negative for cough, hemoptysis, chest pain, or dyspnea on exertion.  Gastrointestinal: Negative for dysphagia, odynophagia, reflux symptoms, nausea, vomiting, change in bowel habits, diarrhea, constipation and abdominal pain.  Integument/breast: Negative for rash, skin lesions, and pruritus.  Hematologic/lymphatic: Negative for easy bruising, bleeding, and lymphadenopathy.  Musculoskeletal: Negative for myalgias, arthralgias, muscle weakness.  Neurological: Negative for headaches, dizziness, speech problems, gait problems and weakness.    A comprehensive 14 point review of systems was completed.  Pertinent positives and negatives noted in the the HPI.     Vital Signs:  /72 (BP Location: Left arm, Patient Position: Sitting, Cuff Size: large)   Pulse 67   Temp 98.1 °F (36.7 °C) (Oral)   Resp 18   Ht 1.651 m (5' 5\")   Wt 80.7 kg (178 lb)   SpO2 94%   BMI 29.62 kg/m²     Physical  Examination:    General: Patient is alert and oriented x 3, not in acute distress.  Psych: Friendly, cooperative with appropriate questions and responses  HEENT: EOMs intact. Oropharynx is clear.   Neck: No palpable lymphadenopathy. Neck is supple.  Lymphatics: There is no palpable lymphadenopathy throughout in the cervical, supraclavicular, axillary, or inguinal regions.  Chest: Clear to auscultation. No wheezes or rales.  Heart: Regular rate and rhythm. S1S2 normal.  Abdomen: Soft, non tender with good bowel sounds.  No hepatosplenomegaly.  No palpable mass.  Extremities: No edema or calf tenderness.  Neurological: Grossly intact.     Performance Status:    ECOG 0: Fully active, able to carry on all pre-disease performance without restriction      Labs:    Lab Results   Component Value Date/Time    WBC 9.5 03/24/2024 07:32 AM    RBC 4.66 03/24/2024 07:32 AM    HGB 13.7 03/24/2024 07:32 AM    HCT 41.6 03/24/2024 07:32 AM    MCV 89.3 03/24/2024 07:32 AM    MCH 29.4 03/24/2024 07:32 AM    MCHC 32.9 03/24/2024 07:32 AM    RDW 13.4 03/24/2024 07:32 AM    NEPRELIM 7.33 03/17/2024 03:22 PM    .0 03/24/2024 07:32 AM       Lab Results   Component Value Date/Time    GLU 94 03/24/2024 07:32 AM    BUN 10 03/24/2024 07:32 AM    CREATSERUM 0.56 03/24/2024 07:32 AM    GFRNAA >60 03/14/2017 05:56 AM    CA 9.3 03/24/2024 07:32 AM    ALB 3.7 03/24/2024 07:32 AM     03/24/2024 07:32 AM    K 4.3 03/24/2024 07:32 AM     03/24/2024 07:32 AM    CO2 29.0 03/24/2024 07:32 AM    ALKPHO 37 (L) 03/24/2024 07:32 AM    AST 39 (H) 03/24/2024 07:32 AM    ALT 26 03/24/2024 07:32 AM       Imaging:    CT chest PE imaging results were reviewed with the patient.  I offered to show the patient the right lower lobe mass, but the patient is not interested in seeing this lesion concerning for cancer    Pathology:    N/A    Impression:      ICD-10-CM    1. Lung mass  R91.8 Full code      2. History of tobacco use  Z87.891       3. ICD  (implantable cardioverter-defibrillator) in place  Z95.810       4. History of ETOH abuse  F10.11         PMH relevant for extensive tobacco use history, nonischemic cardiomyopathy s/p AICD, COPD and prior ETOH use presents for an evaluation of RLL mass concerning for lung cancer    Plan:    1.) Right lung mass w/ extensive tobacco use hx    --based on her tobacco use, suspect this is a primary lung cancer; needs a PET scan to complete staging  --clinically has T2aN2 disease; c/w at least stage III disease, CT head imaging reviewed showing no signs of brain mets. MRI brain was not completed as it is unknown if her ICD is MRI compatible  --pt needs sampling of tissue to confirm a lung cancer diagnosis; might need the subcarinal node sampled to confirm stage III disease, but PET scan would determine if there are any signs of possible distant disease involved  --pt to f/u with me s/p biopsy; hoping the lung nurse navigator will help w/coordination of care as she has met with [facility] pulmonologist in the past    2.) Pacer w/ ICD in place    --needs to follow with a cardiologist as she mentions the battery for her ICD is dead  --EF in 3/24 was noted to be 50-55%    3.) Hx of ETOH use    -- Mild elevation of AST noted from end of 3/2023; reports being sober since 2014, has not had any abdominal imaging re: possible cirrhosis features of the liver    MDM: High Risk    Elías Cole MD  [facility]  [address]. [facility], [place] [address]

## 2024-04-09 ENCOUNTER — SOCIAL WORK SERVICES (OUTPATIENT)
Dept: HEMATOLOGY/ONCOLOGY | Facility: HOSPITAL | Age: 64
End: 2024-04-09

## 2024-04-09 NOTE — PROGRESS NOTES
SW attempted to contact patient regard status of her medicaid application. Patient did not answer. SW left a voicemail requesting a call back for additional assistance.

## 2024-04-23 ENCOUNTER — SOCIAL WORK SERVICES (OUTPATIENT)
Dept: HEMATOLOGY/ONCOLOGY | Facility: HOSPITAL | Age: 64
End: 2024-04-23

## 2024-04-23 NOTE — PROGRESS NOTES
SW attempted to contact patient via phone call regarding status of Medicaid application. Patient did not answer. SW left a voicemail for patient to return call to discuss status of Mediciad and  financial assistance.

## 2024-06-19 ENCOUNTER — OFFICE VISIT (OUTPATIENT)
Dept: INTERNAL MEDICINE CLINIC | Facility: CLINIC | Age: 64
End: 2024-06-19

## 2024-06-19 ENCOUNTER — TELEPHONE (OUTPATIENT)
Dept: INTERNAL MEDICINE CLINIC | Facility: CLINIC | Age: 64
End: 2024-06-19

## 2024-06-19 VITALS
SYSTOLIC BLOOD PRESSURE: 128 MMHG | OXYGEN SATURATION: 95 % | HEART RATE: 92 BPM | TEMPERATURE: 98 F | BODY MASS INDEX: 34.16 KG/M2 | WEIGHT: 205 LBS | HEIGHT: 65 IN | DIASTOLIC BLOOD PRESSURE: 82 MMHG

## 2024-06-19 DIAGNOSIS — E78.5 HYPERLIPIDEMIA, UNSPECIFIED HYPERLIPIDEMIA TYPE: ICD-10-CM

## 2024-06-19 DIAGNOSIS — Z95.810 ICD (IMPLANTABLE CARDIOVERTER-DEFIBRILLATOR) IN PLACE: ICD-10-CM

## 2024-06-19 DIAGNOSIS — J44.9 CHRONIC OBSTRUCTIVE PULMONARY DISEASE, UNSPECIFIED COPD TYPE (HCC): ICD-10-CM

## 2024-06-19 DIAGNOSIS — R91.8 LUNG MASS: Primary | ICD-10-CM

## 2024-06-19 DIAGNOSIS — I50.9 CONGESTIVE HEART FAILURE, UNSPECIFIED HF CHRONICITY, UNSPECIFIED HEART FAILURE TYPE (HCC): ICD-10-CM

## 2024-06-19 DIAGNOSIS — F10.91 ALCOHOL USE DISORDER IN REMISSION: ICD-10-CM

## 2024-06-19 DIAGNOSIS — E11.9 TYPE 2 DIABETES MELLITUS WITHOUT COMPLICATION, WITHOUT LONG-TERM CURRENT USE OF INSULIN (HCC): ICD-10-CM

## 2024-06-19 DIAGNOSIS — I42.9 CARDIOMYOPATHY, UNSPECIFIED TYPE (HCC): ICD-10-CM

## 2024-06-19 DIAGNOSIS — Z87.891 HISTORY OF TOBACCO USE: ICD-10-CM

## 2024-06-19 DIAGNOSIS — Z91.09 ENVIRONMENTAL ALLERGIES: ICD-10-CM

## 2024-06-19 DIAGNOSIS — Z00.00 HEALTH MAINTENANCE EXAMINATION: ICD-10-CM

## 2024-06-19 PROBLEM — J11.1 INFLUENZA: Status: RESOLVED | Noted: 2024-03-17 | Resolved: 2024-06-19

## 2024-06-19 PROBLEM — J20.9 ACUTE BRONCHITIS, UNSPECIFIED ORGANISM: Status: RESOLVED | Noted: 2017-03-13 | Resolved: 2024-06-19

## 2024-06-19 PROBLEM — Z71.89 ADVANCE CARE PLANNING: Status: RESOLVED | Noted: 2024-03-19 | Resolved: 2024-06-19

## 2024-06-19 PROBLEM — G93.40 ENCEPHALOPATHY: Status: RESOLVED | Noted: 2024-03-19 | Resolved: 2024-06-19

## 2024-06-19 PROBLEM — J20.9 ACUTE BRONCHITIS: Status: RESOLVED | Noted: 2017-03-13 | Resolved: 2024-06-19

## 2024-06-19 PROBLEM — R06.89 HYPERCAPNIA: Status: RESOLVED | Noted: 2024-03-17 | Resolved: 2024-06-19

## 2024-06-19 PROBLEM — R09.02 HYPOXIA: Status: RESOLVED | Noted: 2024-03-17 | Resolved: 2024-06-19

## 2024-06-19 PROBLEM — Z51.5 PALLIATIVE CARE BY SPECIALIST: Status: RESOLVED | Noted: 2024-03-19 | Resolved: 2024-06-19

## 2024-06-19 PROBLEM — R47.1 DYSARTHRIA: Status: RESOLVED | Noted: 2024-03-17 | Resolved: 2024-06-19

## 2024-06-19 PROBLEM — Z71.89 GOALS OF CARE, COUNSELING/DISCUSSION: Status: RESOLVED | Noted: 2024-03-19 | Resolved: 2024-06-19

## 2024-06-19 PROCEDURE — 99204 OFFICE O/P NEW MOD 45 MIN: CPT | Performed by: FAMILY MEDICINE

## 2024-06-19 RX ORDER — BUDESONIDE AND FORMOTEROL FUMARATE DIHYDRATE 160; 4.5 UG/1; UG/1
1 AEROSOL RESPIRATORY (INHALATION) DAILY
COMMUNITY
End: 2024-06-19

## 2024-06-19 RX ORDER — ALBUTEROL SULFATE 90 UG/1
1 AEROSOL, METERED RESPIRATORY (INHALATION) EVERY 4 HOURS PRN
Qty: 1 EACH | Refills: 0 | Status: SHIPPED | OUTPATIENT
Start: 2024-06-19

## 2024-06-19 RX ORDER — ENALAPRIL MALEATE 2.5 MG/1
2.5 TABLET ORAL 2 TIMES DAILY
Qty: 180 TABLET | Refills: 3 | Status: SHIPPED | OUTPATIENT
Start: 2024-06-19

## 2024-06-19 RX ORDER — MONTELUKAST SODIUM 10 MG/1
10 TABLET ORAL NIGHTLY
Qty: 90 TABLET | Refills: 3 | Status: SHIPPED | OUTPATIENT
Start: 2024-06-19

## 2024-06-19 RX ORDER — IPRATROPIUM BROMIDE AND ALBUTEROL SULFATE 2.5; .5 MG/3ML; MG/3ML
3 SOLUTION RESPIRATORY (INHALATION) EVERY 6 HOURS PRN
Qty: 60 EACH | Refills: 3 | Status: SHIPPED | OUTPATIENT
Start: 2024-06-19

## 2024-06-19 RX ORDER — ATORVASTATIN CALCIUM 40 MG/1
40 TABLET, FILM COATED ORAL NIGHTLY
Qty: 90 TABLET | Refills: 3 | Status: SHIPPED | OUTPATIENT
Start: 2024-06-19

## 2024-06-19 RX ORDER — CARVEDILOL 6.25 MG/1
6.25 TABLET ORAL 2 TIMES DAILY WITH MEALS
Qty: 180 TABLET | Refills: 3 | Status: SHIPPED | OUTPATIENT
Start: 2024-06-19

## 2024-06-19 NOTE — PROGRESS NOTES
FAMILY MEDICINE CLINIC NOTE    HPI  Caron Daniels is a 64 year old female presenting for       #Lung mass  -oncology Dr Cole  -PET scan ordered  -without insurance though     #COPD  -albuterol - 2 puffs 3 times daily  -symbicort - cannot afford  -reports cannot afford other inhalers  -ipratropium-albuterol nebulizer  - desiring refill       #CHF  #Cardiomyopathy   #ICD in place  -cardiology Dr Coretta Toledo  -enalapril 5 mg in the morning and 2.5 mg nightly   -carvedilol 6.25 mg twice daily   -furosemide 40 mg daily   -potassium 20 meq daily   -aspirin 81 mg daily   -atorvastatin 40 mg nightly    #Environmental allergies  -taking benadryl  -taking claritin everyday    #GERD  -prilosec 20 mg daily     #DM  -Hba1c: 6.6 3/17/2024  -Microalbuminuria: Indicated - declines  -B12: Can defer  -Pneumonia vaccine: Indicated - defers as she has no insurance right now  -HepB: Declines  -Eye exam: Indicated - has no insurance, defers  -Diabetic foot exam: 6/19/24 Bilateral barefoot skin diabetic exam completed, visualized feet and the appearance shows very dry skin. Bilateral monofilament/sensation of both feet is normal. Pulsation pedal pulse exam of both lower legs/feet is normal as well.  -Current medications: Not on blood sugar medication   -Blood sugars:  -AM:      -Noon:   -PM:      -hypoglycemia:    #HLD  -now on atorvastatin 40 mg nightly    #Alcohol use disorder, in remission  -sober since 2014    ROS  GENERAL: No fever/chills, no recent weight loss  HEENT: No visual changes, no changes in hearing, no sore throats  NECK: No pain, no swelling  RESP: No cough, no SOB  CV: No chest pain, no palpitations  GI: No abd pain, no N/V/D  MSK: No edema  SKIN: No new rashes  NEURO: No numbness, no tingling, no headaches    HEALTH MAINTENANCE  Health Maintenance Topics with due status: Overdue       Topic Date Due    Annual Physical Never done    Colorectal Cancer Screening Never done    Mammogram Never done    Pneumococcal  Vaccine: Birth to 64yrs Never done    DTaP,Tdap,and Td Vaccines Never done    Pap Smear Never done    Zoster Vaccines Never done    Lung Cancer Screening Never done    COVID-19 Vaccine 09/01/2023       ALLERGIES  No Known Allergies    MEDICATIONS  Current Outpatient Medications   Medication Sig Dispense Refill    diphenhydrAMINE 12.5 MG/5ML Oral Liquid Take by mouth 4 (four) times daily as needed for Allergies.      ipratropium-albuterol 0.5-2.5 (3) MG/3ML Inhalation Solution Take 3 mL by nebulization every 6 (six) hours as needed. 60 each 3    montelukast 10 MG Oral Tab Take 1 tablet (10 mg total) by mouth nightly. 90 tablet 3    albuterol (PROVENTIL HFA) 108 (90 Base) MCG/ACT Inhalation Aero Soln Inhale 1 puff into the lungs every 4 (four) hours as needed for Wheezing. 1 each 0    atorvastatin 40 MG Oral Tab Take 1 tablet (40 mg total) by mouth nightly. 90 tablet 3    enalapril 2.5 MG Oral Tab Take 1 tablet (2.5 mg total) by mouth 2 (two) times daily. 180 tablet 3    carvedilol 6.25 MG Oral Tab Take 1 tablet (6.25 mg total) by mouth 2 (two) times daily with meals. 180 tablet 3    aspirin 81 MG Oral Chew Tab Chew 1 tablet (81 mg total) by mouth daily. 30 tablet 0    furosemide 40 MG Oral Tab Take 1 tablet (40 mg total) by mouth daily.      loratadine 10 MG Oral Tab Take 1 tablet (10 mg total) by mouth daily.      omeprazole 20 MG Oral Capsule Delayed Release Take 1 capsule (20 mg total) by mouth every morning.      Potassium Chloride ER 10 MEQ Oral Tab CR Take 2 tablets (20 mEq total) by mouth daily.         ACTIVE PROBLEMS  Patient Active Problem List   Diagnosis    COPD (chronic obstructive pulmonary disease) ([facility])    Lung mass    History of tobacco use    ICD (implantable cardioverter-defibrillator) in place    Alcohol use disorder in remission    CHF (congestive heart failure) ([facility])    Cardiomyopathy ([facility])    Type 2 diabetes mellitus without complication, without long-term current use of insulin ([facility])     Environmental allergies    Hyperlipidemia    Health maintenance examination       PAST MEDICAL HISTORY  Past Medical History:    Alcohol use disorder in remission    Cardiomyopathy (HCC)    CHF (congestive heart failure) (HCC)    COPD (chronic obstructive pulmonary disease) (HCC)    Type 2 diabetes mellitus without complication, without long-term current use of insulin (HCC)       PAST SOCIAL HISTORY  Social History     Socioeconomic History    Marital status:      Spouse name: Not on file    Number of children: Not on file    Years of education: Not on file    Highest education level: Not on file   Occupational History    Not on file   Tobacco Use    Smoking status: Former     Current packs/day: 0.00     Average packs/day: 3.5 packs/day for 55.0 years (192.5 ttl pk-yrs)     Types: Cigarettes     Start date: 1969     Quit date: 2024     Years since quittin.3    Smokeless tobacco: Never   Vaping Use    Vaping status: Never Used   Substance and Sexual Activity    Alcohol use: No     Comment: formerly drank heavily; sober since 15    Drug use: Not Currently     Types: Cannabis    Sexual activity: Not Currently     Partners: Male   Other Topics Concern    Caffeine Concern Not Asked    Exercise Not Asked    Seat Belt Not Asked    Special Diet Not Asked    Stress Concern Not Asked    Weight Concern Not Asked   Social History Narrative    Caron is  to [place]. She has 1 adult son. 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 She lives with her  in [place], [place].     Social Determinants of Health     Financial Resource Strain: Not on file   Food Insecurity: No Food Insecurity (3/17/2024)    Food Insecurity     Food Insecurity: Never true   Transportation Needs: No Transportation Needs (3/17/2024)    Transportation Needs     Lack of Transportation: No   Physical Activity: Not on file   Stress: Not on file   Social Connections: Not on file   Housing Stability: Low Risk   (3/17/2024)    Housing Stability     Housing Instability: No     Housing Instability Emergency: Not on file     Crib or [place]: Not on file       PAST SURGICAL HISTORY  Past Surgical History:   Procedure Laterality Date    Conization cervix,knife/laser  1987    Hc insert dual electrode pmkr or icd  2009       PAST FAMILY HISTORY  Family History   Problem Relation Age of Onset    Obesity Mother     Heart Attack Mother     Hypertension Father     Other (Parkinsons) Father     Arrhythmia Brother     No Known Problems Brother     Dementia Maternal Grandmother     Heart Attack Maternal Grandfather     Hypertension Paternal Grandmother     Heart Attack Paternal Grandmother     No Known Problems Paternal Grandfather     Breast Cancer Neg     Colon Cancer Neg          PHYSICAL EXAM  Vitals:    06/19/24 1302   BP: 128/82   Pulse: 92   Temp: 97.6 °F (36.4 °C)   SpO2: 95%   Weight: 205 lb (93 kg)   Height: 5' 5\" (1.651 m)      Body mass index is 34.11 kg/m².    GENERAL: Somewhat anxious appearing  HEENT: Moist mucous membranes  NECK: Supple, non-tender  RESP: Non-labored respirations, CTAB, no wheezing, no rales, no ronchi  CV: RRR, no murmurs  GI: Soft, non-distended, non-tender, no guarding, no rebound, no masses  MSK: No edema. Bilateral barefoot skin diabetic exam completed, visualized feet and the appearance shows very dry skin. Bilateral monofilament/sensation of both feet is normal. Pulsation pedal pulse exam of both lower legs/feet is normal as well.  SKIN: Warm and dry, no rashes  NEURO: Answering questions appropriately    LABS  Lab Results   Component Value Date    WBC 9.5 03/24/2024    HGB 13.7 03/24/2024    HCT 41.6 03/24/2024    .0 03/24/2024    NEPERCENT 76.6 03/17/2024    LYPERCENT 9.0 03/17/2024    MOPERCENT 13.5 03/17/2024    EOPERCENT 0.0 03/17/2024    BAPERCENT 0.3 03/17/2024    NE 7.33 03/17/2024    LYMABS 0.86 (L) 03/17/2024    MOABSO 1.29 (H) 03/17/2024    EOABSO 0.00 03/17/2024    BAABSO 0.03  03/17/2024       Lab Results   Component Value Date     03/24/2024    K 4.3 03/24/2024     03/24/2024    CO2 29.0 03/24/2024    ANIONGAP 6 03/24/2024    BUN 10 03/24/2024    CREATSERUM 0.56 03/24/2024    BUNCREA 17.9 03/24/2024    GLU 94 03/24/2024    CA 9.3 03/24/2024    OSMOCALC 297 (H) 03/24/2024    GFRNAA >60 03/14/2017    GFRAA >60 03/14/2017    ALT 26 03/24/2024    AST 39 (H) 03/24/2024    ALKPHO 37 (L) 03/24/2024    BILT 0.4 03/24/2024    TP 6.2 03/24/2024    ALB 3.7 03/24/2024    GLOBULIN 2.5 (L) 03/24/2024    ELECTAG 1.5 03/24/2024         Lab Results   Component Value Date    CHOLEST 212 (H) 03/18/2024    TRIG 123 03/18/2024    HDL 34 (L) 03/18/2024     (H) 03/18/2024    VLDL 24 03/18/2024    NONHDLC 178 (H) 03/18/2024        DIAGNOSTICS      ASSESSMENT/PLAN  Problem List Items Addressed This Visit          HCC Problems    Cardiomyopathy (HCC)     Patient with a history of cardiomyopathy and congestive heart failure.  She follows with cardiology.  Currently on enalapril 5 mg in the morning and 2.5 mg at night  Also on carvedilol 6.25 mg twice daily.  Her blood pressures are normal in the office today  She is also on furosemide 40 mg daily for swelling  Currently also on aspirin 81 mg daily.         Relevant Medications    atorvastatin 40 MG Oral Tab    enalapril 2.5 MG Oral Tab    carvedilol 6.25 MG Oral Tab    CHF (congestive heart failure) (HCC)     Patient with a history of cardiomyopathy and congestive heart failure.  She follows with cardiology.  Currently on enalapril 5 mg in the morning and 2.5 mg at night  Also on carvedilol 6.25 mg twice daily.  Her blood pressures are normal in the office today  She is also on furosemide 40 mg daily for swelling  Currently also on aspirin 81 mg daily.         Relevant Medications    atorvastatin 40 MG Oral Tab    enalapril 2.5 MG Oral Tab    carvedilol 6.25 MG Oral Tab    COPD (chronic obstructive pulmonary disease) (HCC)     Patient with a  history of COPD.  Uses albuterol as needed  Also uses ipratropium-albuterol nebulizer solutions.  I discussed with her that she may benefit from being on other medication such as Symbicort or Atrovent.  She reports that all these medications are cost prohibitive to her and she cannot afford them.  She has been prescribed Symbicort in the past and was not able to afford the medication.  Does have a history of severe allergies  Can try taking montelukast to see if this helps with her breathing.  Follow-up as needed.         Relevant Medications    ipratropium-albuterol 0.5-2.5 (3) MG/3ML Inhalation Solution    albuterol (PROVENTIL HFA) 108 (90 Base) MCG/ACT Inhalation Aero Soln    Type 2 diabetes mellitus without complication, without long-term current use of insulin ([facility])     Patient with a new diagnosis of type 2 diabetes seen in the hospital.  Her A1c is currently at goal of less than 7.  She has no insurance right now cannot afford medications and treatment  She would benefit from getting urine microalbumin, Prevnar 20 vaccine.  Needs to see ophthalmology   She does not want any vaccines or lab test at this time         Relevant Medications    atorvastatin 40 MG Oral Tab    enalapril 2.5 MG Oral Tab       Cardiac and Vasculature    Hyperlipidemia     Continue atorvastatin 40 mg nightly.         Relevant Medications    atorvastatin 40 MG Oral Tab    ICD (implantable cardioverter-defibrillator) in place     Follows with cardiology         Relevant Medications    carvedilol 6.25 MG Oral Tab       Pulmonary and Pneumonias    Lung mass - Primary     Patient with a history of lung mass  There are concerns for possible malignancy  Has PET scan ordered by oncology.  With financial constraints  Would benefit from social work involvement-discussed with nurse.         Relevant Medications    ipratropium-albuterol 0.5-2.5 (3) MG/3ML Inhalation Solution    albuterol (PROVENTIL HFA) 108 (90 Base) MCG/ACT Inhalation Aero Soln        Mental Health    Alcohol use disorder in remission     Patient with a history of alcohol use disorder in remission since 2014  Continue alcohol cessation.            Allergies and Adverse Reactions    Environmental allergies     Continue loratadine 10 mg daily  Advised trying to back off on diphenhydramine if at all possible.  I will prescribe montelukast 10 mg nightly to see if she is able to get this  She currently does not have insurance at this time and many medications are cost prohibitive.         Relevant Medications    diphenhydrAMINE 12.5 MG/5ML Oral Liquid    montelukast 10 MG Oral Tab       Tobacco    History of tobacco use     Continue smoking cessation.            Health Encounters    Health maintenance examination     Patient without insurance at this time.  Would benefit from routine preventative health exam at some point if she can get insurance.  She prefers to hold off on getting additional evaluation and testing for now.  Prefers to hold off on vaccines.   Can follow up when able  Would benefit from getting  to facilitate financial constrains especially given her need of a PET scan            Return in about 6 months (around 2024) for physical at some point if possible.    This is a Header Test   Topic Date Due    Annual Physical  Never done        Deondre Calderon MD  Family Medicine           Pre-chartin minutes  Reviewing/obtainin minutes  Medical Exam:1 minutes  Counseling/education: 5 minutes  Notes: 10 minutes  Referring/communicatin minutes  Care coordination: 0 minutes    My total time spent caring for the patient on the day of the encounter: 48 minutes

## 2024-06-19 NOTE — ASSESSMENT & PLAN NOTE
Patient with a history of alcohol use disorder in remission since 2014  Continue alcohol cessation.

## 2024-06-19 NOTE — ASSESSMENT & PLAN NOTE
Patient with a history of lung mass  There are concerns for possible malignancy  Has PET scan ordered by oncology.  With financial constraints  Would benefit from social work involvement-discussed with nurse.

## 2024-06-19 NOTE — ASSESSMENT & PLAN NOTE
Patient with a history of COPD.  Uses albuterol as needed  Also uses ipratropium-albuterol nebulizer solutions.  I discussed with her that she may benefit from being on other medication such as Symbicort or Atrovent.  She reports that all these medications are cost prohibitive to her and she cannot afford them.  She has been prescribed Symbicort in the past and was not able to afford the medication.  Does have a history of severe allergies  Can try taking montelukast to see if this helps with her breathing.  Follow-up as needed.

## 2024-06-19 NOTE — TELEPHONE ENCOUNTER
Spoke with patient at today's She States that she make too much money so her medicare was denied. Advised that she follow up with Magda Ocampo: [phone]. Survivorship support ([facility]) Message left on Magda's voicemail  to please call and give us an update us on patient's process.

## 2024-06-19 NOTE — ASSESSMENT & PLAN NOTE
Patient with a history of cardiomyopathy and congestive heart failure.  She follows with cardiology.  Currently on enalapril 5 mg in the morning and 2.5 mg at night  Also on carvedilol 6.25 mg twice daily.  Her blood pressures are normal in the office today  She is also on furosemide 40 mg daily for swelling  Currently also on aspirin 81 mg daily.

## 2024-06-19 NOTE — PATIENT INSTRUCTIONS
PATIENT INSTRUCTIONS    Thank you for seeing me today, it was a pleasure taking care of you.  Please check out at the  and schedule a follow up appointment.  Return in about 6 months (around 12/19/2024) for physical at some point if possible.  Please remember that the rocio period for all appointments is 5 minutes. This is to help maximize the amount of time that we can spend together at our visits.    Need PET scan  Try to get financial support from [facility]  Continue all current medications  Try to decrease benadryl usage  Can try to add on montelukast 10 mg nightly - allergies and breathing  Can use nebulizer   See cardiology  Ideally close follow up with oncology after you get your PET scan   You have a new diagnosis of diabetes  Please monitor a healthy diet and exercise if possible    Best,  Dr. Calderon

## 2024-06-19 NOTE — ASSESSMENT & PLAN NOTE
Patient with a new diagnosis of type 2 diabetes seen in the hospital.  Her A1c is currently at goal of less than 7.  She has no insurance right now cannot afford medications and treatment  She would benefit from getting urine microalbumin, Prevnar 20 vaccine.  Needs to see ophthalmology   She does not want any vaccines or lab test at this time

## 2024-06-19 NOTE — ASSESSMENT & PLAN NOTE
Continue loratadine 10 mg daily  Advised trying to back off on diphenhydramine if at all possible.  I will prescribe montelukast 10 mg nightly to see if she is able to get this  She currently does not have insurance at this time and many medications are cost prohibitive.

## 2024-06-19 NOTE — ASSESSMENT & PLAN NOTE
Patient without insurance at this time.  Would benefit from routine preventative health exam at some point if she can get insurance.  She prefers to hold off on getting additional evaluation and testing for now.  Prefers to hold off on vaccines.   Can follow up when able  Would benefit from getting  to facilitate financial constrains especially given her need of a PET scan

## 2024-06-20 NOTE — TELEPHONE ENCOUNTER
Spoke with Magda Ocampo at [phone].she states that she did reach out to patient with no contact. Per Magda she was to see Dr. Cole  but has not followed up yet. She ask that we follow up with patient and ask her to call Magda. Attempted to contact patient. Unable to reach. Message left on voicemail to call and follow up with Magda Ocampo [phone].

## 2024-07-17 DIAGNOSIS — J44.9 CHRONIC OBSTRUCTIVE PULMONARY DISEASE, UNSPECIFIED COPD TYPE (HCC): ICD-10-CM

## 2024-07-17 RX ORDER — ALBUTEROL SULFATE 90 UG/1
1 AEROSOL, METERED RESPIRATORY (INHALATION) EVERY 4 HOURS PRN
Qty: 1 EACH | Refills: 5 | Status: SHIPPED | OUTPATIENT
Start: 2024-07-17

## 2024-07-17 NOTE — TELEPHONE ENCOUNTER
Patient called for a refill, Albuterol inhaler.    She is askingif she can have refills added, too.       [id number] - [place], [place] - [address] AT [facility] [address], [phone], [phone]   [address] [facility] [address]   Phone: [phone] Fax: [phone]   Hours: Not open 24 hour

## 2024-09-30 ENCOUNTER — OFFICE VISIT (OUTPATIENT)
Dept: FAMILY MEDICINE CLINIC | Facility: CLINIC | Age: 64
End: 2024-09-30

## 2024-09-30 VITALS
DIASTOLIC BLOOD PRESSURE: 68 MMHG | OXYGEN SATURATION: 97 % | BODY MASS INDEX: 35.16 KG/M2 | WEIGHT: 211 LBS | RESPIRATION RATE: 18 BRPM | TEMPERATURE: 98 F | SYSTOLIC BLOOD PRESSURE: 138 MMHG | HEIGHT: 65 IN | HEART RATE: 102 BPM

## 2024-09-30 DIAGNOSIS — J06.9 VIRAL URI: ICD-10-CM

## 2024-09-30 DIAGNOSIS — H01.00A BLEPHARITIS OF UPPER AND LOWER EYELIDS OF BOTH EYES, UNSPECIFIED TYPE: Primary | ICD-10-CM

## 2024-09-30 DIAGNOSIS — H01.00B BLEPHARITIS OF UPPER AND LOWER EYELIDS OF BOTH EYES, UNSPECIFIED TYPE: Primary | ICD-10-CM

## 2024-09-30 PROCEDURE — 99213 OFFICE O/P EST LOW 20 MIN: CPT | Performed by: NURSE PRACTITIONER

## 2024-09-30 RX ORDER — ERYTHROMYCIN 5 MG/G
1 OINTMENT OPHTHALMIC EVERY 6 HOURS
Qty: 1 EACH | Refills: 0 | Status: SHIPPED | OUTPATIENT
Start: 2024-09-30 | End: 2024-10-07

## 2024-09-30 NOTE — PROGRESS NOTES
Subjective:   Patient ID: Caron Daniels is a 64 year old female.    Patient presents for bilateral eye redness for 2 weeks. Reports itching, redness, dry skin to eyelids, watering and crusts to left eye. She has tried Systane and allergy eye drops without relief. Also reports cough and \"head cold\" for couple days. Denies eye injury or visual changes. Denies fever, chills. Reports chronic cough and lung mass but feels breathing is improving in the past month. She has not needed her nebulizer as often. Used it once yesterday.     Covid test negative today. Reports she took prior to coming to [facility].     COPD  Lung mass  CHF        History/Other:   Review of Systems   Constitutional:  Negative for appetite change, chills, fatigue and fever.   HENT:  Positive for congestion. Negative for sinus pressure, sinus pain, sneezing and sore throat.    Eyes:  Positive for discharge, redness and itching. Negative for photophobia, pain and visual disturbance.   Respiratory:  Positive for cough. Negative for chest tightness, shortness of breath and wheezing.    Cardiovascular:  Negative for chest pain, palpitations and leg swelling.   Gastrointestinal:  Negative for abdominal pain, diarrhea, nausea and vomiting.   Musculoskeletal: Negative.    Skin: Negative.    Neurological:  Negative for dizziness, light-headedness and headaches.     Current Outpatient Medications   Medication Sig Dispense Refill    erythromycin 5 MG/GM Ophthalmic Ointment Place 1 Application into both eyes every 6 (six) hours for 7 days. 1 each 0    albuterol (PROVENTIL HFA) 108 (90 Base) MCG/ACT Inhalation Aero Soln Inhale 1 puff into the lungs every 4 (four) hours as needed for Wheezing. 1 each 5    diphenhydrAMINE 12.5 MG/5ML Oral Liquid Take by mouth 4 (four) times daily as needed for Allergies.      montelukast 10 MG Oral Tab Take 1 tablet (10 mg total) by mouth nightly. 90 tablet 3    atorvastatin 40 MG Oral Tab Take 1 tablet (40 mg total) by mouth  nightly. 90 tablet 3    enalapril 2.5 MG Oral Tab Take 1 tablet (2.5 mg total) by mouth 2 (two) times daily. 180 tablet 3    carvedilol 6.25 MG Oral Tab Take 1 tablet (6.25 mg total) by mouth 2 (two) times daily with meals. 180 tablet 3    aspirin 81 MG Oral Chew Tab Chew 1 tablet (81 mg total) by mouth daily. 30 tablet 0    furosemide 40 MG Oral Tab Take 1 tablet (40 mg total) by mouth daily.      loratadine 10 MG Oral Tab Take 1 tablet (10 mg total) by mouth daily.      omeprazole 20 MG Oral Capsule Delayed Release Take 1 capsule (20 mg total) by mouth every morning.      Potassium Chloride ER 10 MEQ Oral Tab CR Take 2 tablets (20 mEq total) by mouth daily.      ipratropium-albuterol 0.5-2.5 (3) MG/3ML Inhalation Solution Take 3 mL by nebulization every 6 (six) hours as needed. (Patient not taking: Reported on 9/30/2024) 60 each 3     Allergies:  Allergies   Allergen Reactions    Amoxicillin UNKNOWN    Clavulanic Acid UNKNOWN       Objective:   Physical Exam  Constitutional:       General: She is not in acute distress.     Appearance: Normal appearance. She is not ill-appearing.   HENT:      Head: Normocephalic and atraumatic.      Right Ear: Tympanic membrane normal.      Left Ear: Tympanic membrane normal.      Nose: Nose normal.      Mouth/Throat:      Mouth: Mucous membranes are moist.      Pharynx: Oropharynx is clear.   Eyes:      General: No scleral icterus.        Right eye: No foreign body, discharge or hordeolum.         Left eye: No foreign body, discharge or hordeolum.      Conjunctiva/sclera:      Right eye: Right conjunctiva is injected. No chemosis or exudate.     Left eye: Left conjunctiva is injected. No chemosis or exudate.     Comments: Bilateral upper lids erythematous, mildly swollen, dry and flaking. Yellow crusts to left upper and lower eyelashes.   Cardiovascular:      Rate and Rhythm: Normal rate and regular rhythm.   Pulmonary:      Effort: Pulmonary effort is normal.      Breath sounds:  Wheezing and rhonchi present.      Comments: Rhonchi throughout  Musculoskeletal:      Cervical back: Normal range of motion and neck supple.   Lymphadenopathy:      Cervical: No cervical adenopathy.   Skin:     General: Skin is warm and dry.   Neurological:      Mental Status: She is alert.   Psychiatric:         Mood and Affect: Mood normal.         Behavior: Behavior normal.       Assessment & Plan:   1. Blepharitis of upper and lower eyelids of both eyes, unspecified type    2. Viral URI      Meds as below  Meds This Visit:  Requested Prescriptions     Signed Prescriptions Disp Refills    erythromycin 5 MG/GM Ophthalmic Ointment 1 each 0     Sig: Place 1 Application into both eyes every 6 (six) hours for 7 days.     Viral URI  Comfort care reviewed  Patient reports cough is improving. Discussed follow up regarding lung mass. Patient reports she does not have insurance at this time. She plans on following up with Dr. Cole in November.  Reviewed limitations of [facility]. Instructed ED for any new/worsening symptoms  Patient agreeable and V/U

## 2024-12-09 NOTE — PHYSICAL THERAPY NOTE
PT order received, chart reviewed. This is the third attempt, pt is still not appropriate to work with therapy at this time. She remains on AVAPS, in restraints with haldol provided overnight for agitation. [facility] meeting today with full family. Orders completed, RN aware and will place new orders when pt is appropriate.   Please lower prograf to 2/1 and repeat a tacro level in 2 weeks

## 2024-12-30 ENCOUNTER — TELEPHONE (OUTPATIENT)
Dept: INTERNAL MEDICINE CLINIC | Facility: CLINIC | Age: 64
End: 2024-12-30

## 2025-01-03 DIAGNOSIS — J44.9 CHRONIC OBSTRUCTIVE PULMONARY DISEASE, UNSPECIFIED COPD TYPE (HCC): ICD-10-CM

## 2025-01-03 RX ORDER — ALBUTEROL SULFATE 90 UG/1
1 INHALANT RESPIRATORY (INHALATION) EVERY 4 HOURS PRN
Qty: 1 EACH | Refills: 5 | Status: SHIPPED | OUTPATIENT
Start: 2025-01-03

## 2025-01-03 NOTE — TELEPHONE ENCOUNTER
Patient called the office requesting a refill, Albuterol inhaler.    She does have an upcoming appointment, 1/16.       [id number] - [place], [place] - [address] AT [facility] [place] [address], [phone], [phone]   [address] [facility] [address]   Phone: [phone] Fax: [phone]   Hours: Not open 24 hours

## 2025-01-14 NOTE — PROGRESS NOTES
FAMILY MEDICINE CLINIC NOTE    HPI  Caron Daniels is a 64 year old female presenting for     #DM  -Hba1c: 6.6 3/17/2024, 5.5 1/16/25  -Microalbuminuria: Indicated   -B12: Not indicated  -Pneumonia vaccine: Indicated - will order for future screen  -HepB: Declines  -Eye exam: Saw an optometrist Frank Guadalupe  -Diabetic foot exam: 1/16/25 Bilateral barefoot skin diabetic exam completed, visualized feet and the appearance shows very dry skin. Bilateral monofilament/sensation of both feet is normal. Pulsation pedal pulse exam of both lower legs/feet is normal as well.  -Current medications: Not on blood sugar medication   -Blood sugars:  -AM:      -Noon:   -PM:      -hypoglycemia:     #HLD  -now on atorvastatin 40 mg nightly    #Lung mass  -oncology Dr Cole  -PET scan ordered - now advised to get done     #COPD  -albuterol - 2 puffs 3 times daily  -symbicort wanting a refill now  -ipratropium-albuterol nebulizer          #CHF  #Cardiomyopathy   #ICD in place  -cardiology Dr Coretta Toledo  -enalapril 5 mg in the morning and 2.5 mg nightly   -carvedilol 3.125 mg twice daily   -furosemide 40 mg daily   -potassium 20 meq daily - using intermittently  -aspirin 81 mg daily   -atorvastatin 40 mg nightly     #Environmental allergies  -taking claritin everyday  -montelukast 10 mg nightly    #Mouth sore  -has been using mouth wash  -ongoing for 4-6 mouths     ---other     #GERD  -prilosec 20 mg daily        #Alcohol use disorder, in remission  -sober since 2014        ROS  GENERAL: No fever/chills, no recent weight loss  HEENT: No visual changes, no changes in hearing, no sore throats  NECK: No pain, no swelling  RESP: No cough, no SOB  CV: No chest pain, no palpitations  GI: No abd pain, no N/V/D  MSK: No edema  SKIN: No new rashes  NEURO: No numbness, no tingling, no headaches    HEALTH MAINTENANCE  Health Maintenance Topics with due status: Overdue       Topic Date Due    Annual Physical Never done    Diabetes Care  Dilated Eye Exam Never done    Colorectal Cancer Screening Never done    Mammogram Never done    Pneumococcal Vaccine: 50+ Years Never done    DTaP,Tdap,and Td Vaccines Never done    Pap Smear Never done    Zoster Vaccines Never done    Lung Cancer Screening Never done    COVID-19 Vaccine 09/01/2024    Diabetes Care A1C 09/17/2024    Influenza Vaccine 10/01/2024    Annual Depression Screening 01/01/2025    Diabetes Care: Foot Exam (Annual) 01/01/2025    Diabetes Care: Microalb/Creat Ratio (Annual) Never done       ALLERGIES  Allergies[1]    MEDICATIONS  Current Outpatient Medications   Medication Sig Dispense Refill    Budesonide-Formoterol Fumarate (SYMBICORT) 160-4.5 MCG/ACT Inhalation Aerosol Inhale 2 puffs into the lungs 2 (two) times daily. 10.2 g 3    carvedilol 3.125 MG Oral Tab Take 1 tablet (3.125 mg total) by mouth 2 (two) times daily with meals.      atorvastatin 40 MG Oral Tab Take 1 tablet (40 mg total) by mouth nightly. 90 tablet 3    albuterol (PROVENTIL HFA) 108 (90 Base) MCG/ACT Inhalation Aero Soln Inhale 1 puff into the lungs every 4 (four) hours as needed for Wheezing. 1 each 5    ipratropium-albuterol 0.5-2.5 (3) MG/3ML Inhalation Solution Take 3 mL by nebulization every 6 (six) hours as needed. (Patient not taking: Reported on 9/30/2024) 60 each 3    montelukast 10 MG Oral Tab Take 1 tablet (10 mg total) by mouth nightly. 90 tablet 3    enalapril 2.5 MG Oral Tab Take 1 tablet (2.5 mg total) by mouth 2 (two) times daily. 180 tablet 3    aspirin 81 MG Oral Chew Tab Chew 1 tablet (81 mg total) by mouth daily. 30 tablet 0    furosemide 40 MG Oral Tab Take 1 tablet (40 mg total) by mouth daily.      loratadine 10 MG Oral Tab Take 1 tablet (10 mg total) by mouth daily.      omeprazole 20 MG Oral Capsule Delayed Release Take 1 capsule (20 mg total) by mouth every morning.      Potassium Chloride ER 10 MEQ Oral Tab CR Take 2 tablets (20 mEq total) by mouth daily.         ACTIVE PROBLEMS  Patient Active  Problem List   Diagnosis    COPD (chronic obstructive pulmonary disease) (HCC)    Lung mass    History of tobacco use    ICD (implantable cardioverter-defibrillator) in place    Alcohol use disorder in remission    CHF (congestive heart failure) (HCC)    Cardiomyopathy (HCC)    Type 2 diabetes mellitus without complication, without long-term current use of insulin (HCC)    Environmental allergies    Hyperlipidemia    Health maintenance examination    Mouth sore       PAST MEDICAL HISTORY  Past Medical History:    Alcohol use disorder in remission    Cardiomyopathy (HCC)    CHF (congestive heart failure) (HCC)    COPD (chronic obstructive pulmonary disease) (HCC)    Type 2 diabetes mellitus without complication, without long-term current use of insulin (HCC)       PAST SOCIAL HISTORY  Social History     Socioeconomic History    Marital status:      Spouse name: Not on file    Number of children: Not on file    Years of education: Not on file    Highest education level: Not on file   Occupational History    Not on file   Tobacco Use    Smoking status: Former     Current packs/day: 0.00     Average packs/day: 3.5 packs/day for 55.0 years (192.5 ttl pk-yrs)     Types: Cigarettes     Start date: 1969     Quit date: 2024     Years since quittin.8    Smokeless tobacco: Never   Vaping Use    Vaping status: Never Used   Substance and Sexual Activity    Alcohol use: No     Comment: formerly drank heavily; sober since 15    Drug use: Not Currently     Types: Cannabis    Sexual activity: Not Currently     Partners: Male   Other Topics Concern    Caffeine Concern Not Asked    Exercise Not Asked    Seat Belt Not Asked    Special Diet Not Asked    Stress Concern Not Asked    Weight Concern Not Asked   Social History Narrative    Caron is  to [place]. She has 1 adult son. 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 She lives with her  in [place], [place].     [employer]      Financial Resource Strain: Not on file   Food Insecurity: No Food Insecurity (3/17/2024)    Food Insecurity     Food Insecurity: Never true   Transportation Needs: No Transportation Needs (3/17/2024)    Transportation Needs     Lack of Transportation: No   Physical Activity: Not on file   Stress: Not on file   Social Connections: Not on file   Housing Stability: Low Risk  (3/17/2024)    Housing Stability     Housing Instability: No     Housing Instability Emergency: Not on file     Crib or Bassinette: Not on file       PAST SURGICAL HISTORY  Past Surgical History:   Procedure Laterality Date    Conization cervix,knife/laser  1987     insert dual electrode pmkr or icd  2009       PAST FAMILY HISTORY  Family History   Problem Relation Age of Onset    Obesity Mother     Heart Attack Mother     Hypertension Father     Other (Parkinsons) Father     Arrhythmia Brother     No Known Problems Brother     Dementia Maternal Grandmother     Heart Attack Maternal Grandfather     Hypertension Paternal Grandmother     Heart Attack Paternal Grandmother     No Known Problems Paternal Grandfather     Breast Cancer Neg     Colon Cancer Neg          PHYSICAL EXAM  Vitals:    01/16/25 1451   BP: 128/86   Pulse: 64   Temp: 97.4 °F (36.3 °C)   SpO2: 95%   Weight: 195 lb (88.5 kg)   Height: 5' 5\" (1.651 m)      Body mass index is 32.45 kg/m².    GENERAL: NAD  HEENT: Moist mucous membranes, apthous ulcer seen on superior palat. No tongue lesion or film  NECK: Supple, non-tender  RESP: Non-labored respirations, CTAB, no wheezing, no rales, no rhonchi  CV: RRR, no murmurs  GI: Soft, non-distended, non-tender, no guarding, no rebound, no masses  MSK: No edema  SKIN: Warm and dry, no rashes  NEURO: Answering questions appropriately    LABS  Lab Results   Component Value Date    WBC 9.5 03/24/2024    HGB 13.7 03/24/2024    HCT 41.6 03/24/2024    .0 03/24/2024    NEPERCENT 76.6 03/17/2024    LYPERCENT 9.0 03/17/2024    MOPERCENT 13.5  03/17/2024    EOPERCENT 0.0 03/17/2024    BAPERCENT 0.3 03/17/2024    NE 7.33 03/17/2024    LYMABS 0.86 (L) 03/17/2024    MOABSO 1.29 (H) 03/17/2024    EOABSO 0.00 03/17/2024    BAABSO 0.03 03/17/2024       Lab Results   Component Value Date     03/24/2024    K 4.3 03/24/2024     03/24/2024    CO2 29.0 03/24/2024    ANIONGAP 6 03/24/2024    BUN 10 03/24/2024    CREATSERUM 0.56 03/24/2024    BUNCREA 17.9 03/24/2024    GLU 94 03/24/2024    CA 9.3 03/24/2024    OSMOCALC 297 (H) 03/24/2024    GFRNAA >60 03/14/2017    GFRAA >60 03/14/2017    ALT 26 03/24/2024    AST 39 (H) 03/24/2024    ALKPHO 37 (L) 03/24/2024    BILT 0.4 03/24/2024    TP 6.2 03/24/2024    ALB 3.7 03/24/2024    GLOBULIN 2.5 (L) 03/24/2024    ELECTAG 1.5 03/24/2024         Lab Results   Component Value Date    CHOLEST 212 (H) 03/18/2024    TRIG 123 03/18/2024    HDL 34 (L) 03/18/2024     (H) 03/18/2024    VLDL 24 03/18/2024    NONHDLC 178 (H) 03/18/2024        DIAGNOSTICS      ASSESSMENT/PLAN  Problem List Items Addressed This Visit          HCC Problems    Cardiomyopathy (HCC)     Patient with a history of cardiomyopathy and congestive heart failure.  She follows with cardiology.  Currently on enalapril 5 mg in the morning and 2.5 mg at night  Also on carvedilol 3.125 mg twice daily.  Her blood pressures are normal in the office today  She is also on furosemide 40 mg daily for swelling  Currently also on aspirin 81 mg daily.         Relevant Medications    carvedilol 3.125 MG Oral Tab    Other Relevant Orders    CARDIO - INTERNAL    CHF (congestive heart failure) (HCC)     Patient with a history of cardiomyopathy and congestive heart failure.  She follows with cardiology.  Currently on enalapril 5 mg in the morning and 2.5 mg at night  Also on carvedilol 3.125 mg twice daily.  Her blood pressures are normal in the office today  She is also on furosemide 40 mg daily for swelling  Currently also on aspirin 81 mg daily.         Relevant  Medications    carvedilol 3.125 MG Oral Tab    Other Relevant Orders    CARDIO - INTERNAL    COPD (chronic obstructive pulmonary disease) ([facility])     Patient with COPD.  Uses albuterol as needed  Now would like to restart Symbicort 160-4.5 mcg 2 puffs twice daily.  Also on montelukast given history of allergies.  Also uses ipratropium-albuterol nebulizer solutions.  Follow-up as needed.         Relevant Medications    Budesonide-Formoterol Fumarate (SYMBICORT) 160-4.5 MCG/ACT Inhalation Aerosol    Type 2 diabetes mellitus without complication, without long-term current use of insulin ([facility]) - Primary     Patient with type 2 diabetes.  Her A1c is currently at goal of less than 7.  Check urine microalbumin, CMP, lipid panel  Will plan for Prevnar 20 vaccine.  Follows with optometry - needs diabetic eye exam         Relevant Orders    POC Glycohemoglobin [37465]    Comp Metabolic Panel (14)    Microalb/Creat Ratio, Random Urine       Cardiac and Vasculature    Hyperlipidemia     Continue atorvastatin 40 mg nightly.  Monitor CMP and lipid panel         ICD (implantable cardioverter-defibrillator) in place     Follows with cardiology         Relevant Medications    carvedilol 3.125 MG Oral Tab       Pulmonary and Pneumonias    Lung mass     Patient with a history of lung mass  There are concerns for possible malignancy  Has PET scan ordered by oncology - strongly advised to complete now  Should follow up with oncology closely         Relevant Medications    Budesonide-Formoterol Fumarate (SYMBICORT) 160-4.5 MCG/ACT Inhalation Aerosol    Other Relevant Orders    OP REFERRAL TO HEMATOLOGY/ONCOLOGY GROUP       Allergies and Adverse Reactions    Environmental allergies     Continue loratadine 10 mg daily and montelukast 10 mg nightly  Better controlled            EarNoseThroat    Mouth sore     Patient with ulcers seen at the superior palat.  Advise close follow up with a dentist if possible.  Consider ENT - she defers  If  without improvement, can consider empiric treatment for possible thrush though no significant film seen on exam today.            Health Encounters    Health maintenance examination     Check labs  Follow up in 2-3 weeks for a physical         Relevant Orders    CBC With Differential With Platelet    Comp Metabolic Panel (14)    HCV Antibody    Lipid Panel    TSH W Reflex To Free T4       Return in about 2 weeks (around 2025) for physical.    This is a Header Test   Topic Date Due    Annual Physical  Never done        Deondre Calderon MD  Family Medicine           Pre-chartin minutes  Reviewing/obtaining: 10 minutes  Medical Exam:1 minutes  Counseling/education: 5 minutes  Notes: 5 minutes  Referring/communicatin minutes  Care coordination: 0 minutes    My total time spent caring for the patient on the day of the encounter: 23 minutes         [1]   Allergies  Allergen Reactions    Amoxicillin UNKNOWN    Clavulanic Acid UNKNOWN

## 2025-01-16 ENCOUNTER — OFFICE VISIT (OUTPATIENT)
Dept: INTERNAL MEDICINE CLINIC | Facility: CLINIC | Age: 65
End: 2025-01-16
Payer: COMMERCIAL

## 2025-01-16 ENCOUNTER — LAB ENCOUNTER (OUTPATIENT)
Dept: LAB | Age: 65
End: 2025-01-16
Attending: FAMILY MEDICINE
Payer: COMMERCIAL

## 2025-01-16 VITALS
HEART RATE: 64 BPM | WEIGHT: 195 LBS | TEMPERATURE: 97 F | BODY MASS INDEX: 32.49 KG/M2 | HEIGHT: 65 IN | OXYGEN SATURATION: 95 % | DIASTOLIC BLOOD PRESSURE: 86 MMHG | SYSTOLIC BLOOD PRESSURE: 128 MMHG

## 2025-01-16 DIAGNOSIS — R91.8 LUNG MASS: ICD-10-CM

## 2025-01-16 DIAGNOSIS — Z00.00 HEALTH MAINTENANCE EXAMINATION: ICD-10-CM

## 2025-01-16 DIAGNOSIS — Z95.810 ICD (IMPLANTABLE CARDIOVERTER-DEFIBRILLATOR) IN PLACE: ICD-10-CM

## 2025-01-16 DIAGNOSIS — E78.5 HYPERLIPIDEMIA, UNSPECIFIED HYPERLIPIDEMIA TYPE: ICD-10-CM

## 2025-01-16 DIAGNOSIS — E11.9 TYPE 2 DIABETES MELLITUS WITHOUT COMPLICATION, WITHOUT LONG-TERM CURRENT USE OF INSULIN (HCC): ICD-10-CM

## 2025-01-16 DIAGNOSIS — J44.9 CHRONIC OBSTRUCTIVE PULMONARY DISEASE, UNSPECIFIED COPD TYPE (HCC): ICD-10-CM

## 2025-01-16 DIAGNOSIS — K13.79 MOUTH SORE: ICD-10-CM

## 2025-01-16 DIAGNOSIS — Z91.09 ENVIRONMENTAL ALLERGIES: ICD-10-CM

## 2025-01-16 DIAGNOSIS — I50.9 CONGESTIVE HEART FAILURE, UNSPECIFIED HF CHRONICITY, UNSPECIFIED HEART FAILURE TYPE (HCC): ICD-10-CM

## 2025-01-16 DIAGNOSIS — I42.9 CARDIOMYOPATHY, UNSPECIFIED TYPE (HCC): ICD-10-CM

## 2025-01-16 DIAGNOSIS — E11.9 TYPE 2 DIABETES MELLITUS WITHOUT COMPLICATION, WITHOUT LONG-TERM CURRENT USE OF INSULIN (HCC): Primary | ICD-10-CM

## 2025-01-16 LAB
ALBUMIN SERPL-MCNC: 4.7 G/DL (ref 3.2–4.8)
ALBUMIN/GLOB SERPL: 2 {RATIO} (ref 1–2)
ALP LIVER SERPL-CCNC: 64 U/L
ALT SERPL-CCNC: 11 U/L
ANION GAP SERPL CALC-SCNC: 9 MMOL/L (ref 0–18)
AST SERPL-CCNC: 14 U/L (ref ?–34)
BASOPHILS # BLD AUTO: 0.1 X10(3) UL (ref 0–0.2)
BASOPHILS NFR BLD AUTO: 1.1 %
BILIRUB SERPL-MCNC: <0.2 MG/DL (ref 0.2–1.1)
BUN BLD-MCNC: 16 MG/DL (ref 9–23)
BUN/CREAT SERPL: 17.8 (ref 10–20)
CALCIUM BLD-MCNC: 9.4 MG/DL (ref 8.7–10.4)
CHLORIDE SERPL-SCNC: 105 MMOL/L (ref 98–112)
CHOLEST SERPL-MCNC: 173 MG/DL (ref ?–200)
CO2 SERPL-SCNC: 32 MMOL/L (ref 21–32)
CREAT BLD-MCNC: 0.9 MG/DL
CREAT UR-SCNC: 46.9 MG/DL
DEPRECATED RDW RBC AUTO: 43.8 FL (ref 35.1–46.3)
EGFRCR SERPLBLD CKD-EPI 2021: 72 ML/MIN/1.73M2 (ref 60–?)
EOSINOPHIL # BLD AUTO: 0.37 X10(3) UL (ref 0–0.7)
EOSINOPHIL NFR BLD AUTO: 4.1 %
ERYTHROCYTE [DISTWIDTH] IN BLOOD BY AUTOMATED COUNT: 13.2 % (ref 11–15)
FASTING PATIENT LIPID ANSWER: NO
FASTING STATUS PATIENT QL REPORTED: NO
GLOBULIN PLAS-MCNC: 2.4 G/DL (ref 2–3.5)
GLUCOSE BLD-MCNC: 94 MG/DL (ref 70–99)
HCT VFR BLD AUTO: 40.3 %
HDLC SERPL-MCNC: 62 MG/DL (ref 40–59)
HEMOGLOBIN A1C: 5.5 % (ref 4.3–5.6)
HGB BLD-MCNC: 13.4 G/DL
IMM GRANULOCYTES # BLD AUTO: 0.01 X10(3) UL (ref 0–1)
IMM GRANULOCYTES NFR BLD: 0.1 %
LDLC SERPL CALC-MCNC: 90 MG/DL (ref ?–100)
LYMPHOCYTES # BLD AUTO: 1.66 X10(3) UL (ref 1–4)
LYMPHOCYTES NFR BLD AUTO: 18.3 %
MCH RBC QN AUTO: 30 PG (ref 26–34)
MCHC RBC AUTO-ENTMCNC: 33.3 G/DL (ref 31–37)
MCV RBC AUTO: 90.4 FL
MICROALBUMIN UR-MCNC: <0.3 MG/DL
MONOCYTES # BLD AUTO: 0.98 X10(3) UL (ref 0.1–1)
MONOCYTES NFR BLD AUTO: 10.8 %
NEUTROPHILS # BLD AUTO: 5.97 X10 (3) UL (ref 1.5–7.7)
NEUTROPHILS # BLD AUTO: 5.97 X10(3) UL (ref 1.5–7.7)
NEUTROPHILS NFR BLD AUTO: 65.6 %
NONHDLC SERPL-MCNC: 111 MG/DL (ref ?–130)
OSMOLALITY SERPL CALC.SUM OF ELEC: 303 MOSM/KG (ref 275–295)
PLATELET # BLD AUTO: 237 10(3)UL (ref 150–450)
POTASSIUM SERPL-SCNC: 3.9 MMOL/L (ref 3.5–5.1)
PROT SERPL-MCNC: 7.1 G/DL (ref 5.7–8.2)
RBC # BLD AUTO: 4.46 X10(6)UL
SODIUM SERPL-SCNC: 146 MMOL/L (ref 136–145)
TRIGL SERPL-MCNC: 119 MG/DL (ref 30–149)
TSI SER-ACNC: 1.18 UIU/ML (ref 0.55–4.78)
VLDLC SERPL CALC-MCNC: 19 MG/DL (ref 0–30)
WBC # BLD AUTO: 9.1 X10(3) UL (ref 4–11)

## 2025-01-16 PROCEDURE — 82043 UR ALBUMIN QUANTITATIVE: CPT

## 2025-01-16 PROCEDURE — 84443 ASSAY THYROID STIM HORMONE: CPT

## 2025-01-16 PROCEDURE — 80053 COMPREHEN METABOLIC PANEL: CPT

## 2025-01-16 PROCEDURE — 36415 COLL VENOUS BLD VENIPUNCTURE: CPT

## 2025-01-16 PROCEDURE — 85025 COMPLETE CBC W/AUTO DIFF WBC: CPT

## 2025-01-16 PROCEDURE — 86803 HEPATITIS C AB TEST: CPT

## 2025-01-16 PROCEDURE — 82570 ASSAY OF URINE CREATININE: CPT

## 2025-01-16 PROCEDURE — 80061 LIPID PANEL: CPT

## 2025-01-16 RX ORDER — CARVEDILOL 3.12 MG/1
3.12 TABLET ORAL 2 TIMES DAILY WITH MEALS
COMMUNITY

## 2025-01-16 RX ORDER — BUDESONIDE AND FORMOTEROL FUMARATE DIHYDRATE 160; 4.5 UG/1; UG/1
2 AEROSOL RESPIRATORY (INHALATION) 2 TIMES DAILY
Qty: 10.2 G | Refills: 3 | Status: SHIPPED | OUTPATIENT
Start: 2025-01-16

## 2025-01-16 NOTE — ASSESSMENT & PLAN NOTE
Patient with a history of lung mass  There are concerns for possible malignancy  Has PET scan ordered by oncology - strongly advised to complete now  Should follow up with oncology closely

## 2025-01-16 NOTE — ASSESSMENT & PLAN NOTE
Learning About Benefits From Quitting Smoking How does quitting smoking make you healthier? If you're thinking about quitting smoking, you may have a few reasons to be smoke-free. Your health may be one of them. · When you quit smoking, you lower your risks for cancer, lung disease, heart attack, stroke, blood vessel disease, and blindness from macular degeneration. · When you're smoke-free, you get sick less often, and you heal faster. You are less likely to get colds, flu, bronchitis, and pneumonia. · As a nonsmoker, you may find that your mood is better and you are less stressed. When and how will you feel healthier? Quitting has real health benefits that start from day 1 of being smoke-free. And the longer you stay smoke-free, the healthier you get and the better you feel. The first hours · After just 20 minutes, your blood pressure and heart rate go down. That means there's less stress on your heart and blood vessels. · Within 12 hours, the level of carbon monoxide in your blood drops back to normal. That makes room for more oxygen. With more oxygen in your body, you may notice that you have more energy than when you smoked. After 2 weeks · Your lungs start to work better. · Your risk of heart attack starts to drop. After 1 month · When your lungs are clear, you cough less and breathe deeper, so it's easier to be active. · Your sense of taste and smell return. That means you can enjoy food more than you have since you started smoking. Over the years · After 1 year, your risk of heart disease is half what it would be if you kept smoking. · After 5 years, your risk of stroke starts to shrink. Within a few years after that, it's about the same as if you'd never smoked. · After 10 years, your risk of dying from lung cancer is cut by about half. And your risk for many other types of cancer is lower too. How would quitting help others in your life? Patient with a history of cardiomyopathy and congestive heart failure.  She follows with cardiology.  Currently on enalapril 5 mg in the morning and 2.5 mg at night  Also on carvedilol 3.125 mg twice daily.  Her blood pressures are normal in the office today  She is also on furosemide 40 mg daily for swelling  Currently also on aspirin 81 mg daily.   When you quit smoking, you improve the health of everyone who now breathes in your smoke. · Their heart, lung, and cancer risks drop, much like yours. · They are sick less. For babies and small children, living smoke-free means they're less likely to have ear infections, pneumonia, and bronchitis. · If you're a woman who is or will be pregnant someday, quitting smoking means a healthier . · Children who are close to you are less likely to become adult smokers. Where can you learn more? Go to http://samina-cyn.info/. Enter 052 [id number] 72 11 in the search box to learn more about \"Learning About Benefits From Quitting Smoking. \" Current as of: 2017 Content Version: 11.7 © 9012-1837 [employer]. Care instructions adapted under license by [employer] (which disclaims liability or warranty for this information). If you have questions about a medical condition or this instruction, always ask your healthcare professional. [place] [address] any warranty or liability for your use of this information.

## 2025-01-16 NOTE — ASSESSMENT & PLAN NOTE
Patient with COPD.  Uses albuterol as needed  Now would like to restart Symbicort 160-4.5 mcg 2 puffs twice daily.  Also on montelukast given history of allergies.  Also uses ipratropium-albuterol nebulizer solutions.  Follow-up as needed.

## 2025-01-16 NOTE — ASSESSMENT & PLAN NOTE
Patient with ulcers seen at the superior palat.  Advise close follow up with a dentist if possible.  Consider ENT - she defers  If without improvement, can consider empiric treatment for possible thrush though no significant film seen on exam today.

## 2025-01-16 NOTE — ASSESSMENT & PLAN NOTE
Patient with a history of cardiomyopathy and congestive heart failure.  She follows with cardiology.  Currently on enalapril 5 mg in the morning and 2.5 mg at night  Also on carvedilol 3.125 mg twice daily.  Her blood pressures are normal in the office today  She is also on furosemide 40 mg daily for swelling  Currently also on aspirin 81 mg daily.

## 2025-01-16 NOTE — PATIENT INSTRUCTIONS
PATIENT INSTRUCTIONS    Thank you for seeing me today, it was a pleasure taking care of you.  Please check out at the  and schedule a follow up appointment.  Return in about 2 weeks (around 1/30/2025) for physical.  Please remember that the preferred rocio period for appointments is 5 minutes. This is to help maximize the amount of time that we can spend together at our visits.    Please get blood drawn at the lab - I will review your labs with you when I see you  Get PET scan done  Please call [phone] to schedule your imaging appointment.   See oncology Dr Cole afterward  Have Dr Guadalupe look at your eyes for diabetes  Please have the doctor fax his/her note and examination to our office at [phone].  Continue your current medications  Can add on symbicort   Can see cardiology - but verify with insurance first   See a dentist for mouth sores    Jose,  Dr. Kvng URIAS LABS AND IMAGING INFORMATION    Here are some lab and imaging locations available to you. Please note that some of the times and availabilities are subject to change. Please refer to the [email or web address] webpage for the most recent updates. There are also additional locations that can be found on the [email or web address] webpage.    To schedule a lab appointment, please call [phone].    To schedule an imaging appointment, please call [phone], option 2      [address] [address]    Lab Hours  Monday - Friday 7:30am - 5pm  Saturday 6:30am - 3pm    X-ray Hours  Call [phone] for hours or to schedule      [facility]  [address] [place] [address]    Lab Hours  Monday - Friday 6:30am - 8pm  Saturday 6:30am - 3pm  Sunday 6:30am - 3pm    X-ray Hours  Call [phone] for hours or to schedule      [facility]  [address] [place] [address]    Lab Hours  Monday -  Friday 7:30am - 5pm  Saturday 7:30am - 11:30am    X-ray Hours  None at this location      [facility] [address] [address]    Lab Hours  Monday - Friday 8am - 12pm    X-ray Hours  Call [phone] for hours or to schedule      [facility] [place] [address] [place] [address]    Lab Hours  Monday - Friday 7:30am - 5pm  Saturday 6:30am - 3pm    X-ray Hours  Call [phone] for hours or to schedule      [facility] [place]  [address]  [place] [address]    Lab Hours  Monday - Friday 7:30am - 4pm (closed 12:15pm - 1pm)    X-ray Hours  Call [phone] for hours or to schedule      [facility] [place]  [address]  [place] [address]    Lab Hours  Monday - Friday 8am - 4:30pm (closed 12:15pm - 1pm)    X-ray Hours  None at this location

## 2025-01-16 NOTE — ASSESSMENT & PLAN NOTE
Patient with type 2 diabetes.  Her A1c is currently at goal of less than 7.  Check urine microalbumin, CMP, lipid panel  Will plan for Prevnar 20 vaccine.  Follows with optometry - needs diabetic eye exam

## 2025-01-17 ENCOUNTER — TELEPHONE (OUTPATIENT)
Dept: INTERNAL MEDICINE CLINIC | Facility: CLINIC | Age: 65
End: 2025-01-17

## 2025-01-17 LAB — HCV AB SERPL QL IA: NONREACTIVE

## 2025-02-13 ENCOUNTER — PATIENT MESSAGE (OUTPATIENT)
Dept: INTERNAL MEDICINE CLINIC | Facility: CLINIC | Age: 65
End: 2025-02-13

## 2025-02-13 DIAGNOSIS — I42.9 CARDIOMYOPATHY, UNSPECIFIED TYPE (HCC): ICD-10-CM

## 2025-02-13 DIAGNOSIS — I50.9 HEART FAILURE, UNSPECIFIED HF CHRONICITY, UNSPECIFIED HEART FAILURE TYPE (HCC): Primary | ICD-10-CM

## 2025-02-13 DIAGNOSIS — R91.8 LUNG MASS: Primary | ICD-10-CM

## 2025-02-13 NOTE — TELEPHONE ENCOUNTER
Meme message sent to update insurance then our office will enter Cardiology referral for Coretta Melissa MD.

## 2025-02-14 ENCOUNTER — TELEPHONE (OUTPATIENT)
Dept: PULMONOLOGY | Facility: CLINIC | Age: 65
End: 2025-02-14

## 2025-02-14 NOTE — TELEPHONE ENCOUNTER
Patient states that she is being referred for Consult for Lung Mass, and her primary is requesting for the patient to be seen right away by any pulmonologist. Patient has a scheduled consult appointment for 4/24/2025, which is first available.

## 2025-02-17 NOTE — TELEPHONE ENCOUNTER
Edil Odonnell, DO  [facility] Clinical Staff2 hours ago (2:30 PM)       I can see her this afternoon at 3 or 4 pm if she can make it. Otherwise at 1 pm on Wednesday    Michelle Odonnell

## 2025-02-18 ENCOUNTER — TELEPHONE (OUTPATIENT)
Dept: PULMONOLOGY | Facility: CLINIC | Age: 65
End: 2025-02-18

## 2025-02-18 NOTE — TELEPHONE ENCOUNTER
----- Message from Edil Odonnell sent at 2/18/2025  1:19 PM CST -----  Regarding: pt needs to bring chest imaging in if she has had them done  We are seeing this pt tomorrow for a \"lung mass\". Looks like she has been seen by pulmonary at [facility] in the past (there is imaging in the past ordered by Dr. Alfonso and Dr. Cassidy).  The last chest imaging I can see is from 3/2024.   If the pt has had more recent imaging of her chest, then she needs to bring the imaging on a disc and the reports to tomorrow's appt. This is important to be able to complete a full evaluation and take advantage of the clinic appt.     Please let her know.    Thanks    Michelle Odonnell

## 2025-02-18 NOTE — PROGRESS NOTES
Initial Pulmonary Medicine Outpatient Consultation           Reason for Consultation and CC: lung mass, COPD     Referring Physician: Dr. Calderon       HPI: I had the pleasure of seeing Caron Daniels for a Pulmonary Medicine consultation on 2/19/25.  63 yo woman with hx of ETOH in remission, cardiomyopathy, CHF, COPD, DM2, lung mass previously saw [facility] (Dr. Jani Alfonso) while hospitalized in 3/2024. Last imaging was in 3/2024 which confirmed the lung mass. Pt was w/o insurance and had no further imaging since. Took social security early to pay for insurance starting 1/2025. Now here for further pulmonary evaluation.  On Symbicort (160/4.5) 2 puffs BID (for the past month), Albuterol she uses 3-5 hrs for shortness of breath, chest tightness, wheezing and hx of COPD.  Uses Duoneb in the morning.   Not on supplemental oxygen.  Estimates last PFTs were in 2010.    Follows with cardiology @ [place]        REVIEW OF SYSTEMS:  Positives and negatives as stated in HPI. Remainder of 12 pt review of systems otherwise are negative.       PAST MEDICAL HISTORY:  Past Medical History:    Alcohol use disorder in remission    Cardiomyopathy (HCC)    CHF (congestive heart failure) (HCC)    COPD (chronic obstructive pulmonary disease) (HCC)    Type 2 diabetes mellitus without complication, without long-term current use of insulin (HCC)          PAST SURGICAL HISTORY:  Past Surgical History:   Procedure Laterality Date    Conization cervix,knife/laser  1987     insert dual electrode pmkr or icd  2009          PAST FAMILY HISTORY:  Family History   Problem Relation Age of Onset    Obesity Mother     Heart Attack Mother     Hypertension Father     Other (Parkinsons) Father     Arrhythmia Brother     No Known Problems Brother     Dementia Maternal Grandmother     Heart Attack Maternal Grandfather     Hypertension Paternal Grandmother     Heart Attack Paternal Grandmother     No Known Problems Paternal Grandfather     Breast  Cancer Neg     Colon Cancer Neg           PAST SOCIAL HISTORY:  Social History     Socioeconomic History    Marital status:    Tobacco Use    Smoking status: Former     Current packs/day: 0.00     Average packs/day: 3.5 packs/day for 55.0 years (192.5 ttl pk-yrs)     Types: Cigarettes     Start date: 1969     Quit date: 2024     Years since quittin.9    Smokeless tobacco: Never   Vaping Use    Vaping status: Never Used   Substance and Sexual Activity    Alcohol use: No     Comment: formerly drank heavily; sober since 15    Drug use: Not Currently     Types: Cannabis    Sexual activity: Not Currently     Partners: Male   Social History Narrative    Caron is  to [place]. She has 1 adult son. 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 She lives with her  in [place], [place].     Social Drivers of Health     Food Insecurity: No Food Insecurity (3/17/2024)    Food Insecurity     Food Insecurity: Never true   Transportation Needs: No Transportation Needs (3/17/2024)    Transportation Needs     Lack of Transportation: No   Housing Stability: Low Risk  (3/17/2024)    Housing Stability     Housing Instability: No   Quit smoking in 3/2024. Smoked for 55 yrs (started at age 9) @ 1 PPD  Lives with   Has a dog and cat  Employed: works at [employer]      ALLERGIES:  Allergies[1]  Augmentin-makes stomach hurt       MEDS:  Medications Ordered Prior to Encounter[2]       PHYSICAL EXAM:  /78   Pulse 83   Resp (P) 14   Ht 5' 4\" (1.626 m)   Wt 193 lb (87.5 kg)   SpO2 99%   BMI 33.13 kg/m²   CONSTITUTIONAL: alert, oriented, no apparent distress  HEENT: atraumatic normocephalic  MOUTH: mucous membranes are moist. No OP exudates  NECK/THROAT: no JVD. Trachea midline. No obvious thyromegaly  LUNG: clear b/l no wheezing, crackles. Chest symmetric with respiratory motion  HEART: regular rate and rhythm, no obvious murmers or gallops note  ABD: soft non tender. +  bowel sounds. No organomegaly noted  EXT: no clubbing, cyanosis, or edema noted. Pulses intact grossly  NEURO/MUSCULOSKELETAL: no gross deficits  SKIN: warm, dry. No obvious lesions noted  LYMPH: no obvious LAD       IMAGES:  Chest CT 3/2024  No evidence of acute pulmonary embolism to the level of the segmental pulmonary artery branches   Spiculated 3.8 cm right lower lobe pulmonary mass.  This is concerning for a primary lung neoplasia, with infection being considered less likely. Recommend further assessment with tissue sampling.   Multiple additional pulmonary nodules, which of which are spiculated measuring up to 1.5 cm, concerning for sites sites of pulmonary metastatic disease.   A 2.1 cm ground-glass and reticular opacity of the right lower lobe may be infectious or inflammatory in etiology, with primary lung neoplasia being within the differential.  Further assessment with tissue sampling and/or FDG PET-CT would be helpful.   Bilateral hilar and subcarinal lymph nodes measuring up to 1.1 cm.  These are concerning for waqar metastatic disease. Reactive etiologies a differential consideration.        LABS:  Lab Results   Component Value Date    WBC 9.1 01/16/2025    RBC 4.46 01/16/2025    HGB 13.4 01/16/2025    HCT 40.3 01/16/2025    MCV 90.4 01/16/2025    MCH 30.0 01/16/2025    MCHC 33.3 01/16/2025    MPV 10.3 03/14/2017     No results for input(s): \"GLU\", \"BUN\", \"CREATSERUM\", \"GFRAA\", \"GFRNAA\", \"EGFRCR\", \"CA\", \"ALB\", \"NA\", \"K\", \"CL\", \"CO2\", \"ALKPHO\", \"AST\", \"ALT\", \"BILT\", \"TP\" in the last 168 hours.       PFTS none    PSG/CPAP titration results:  none       ASSESSMENT/PLAN:  COPD   -On Symbicort (160/4.5) 2 puffs BID. Advised her to rinse/gargle with water and spit after each use (pt was doing it 30-40 min later)  -Continue PRN Albuterol HFA and Duoneb neb  -Check PFTs  -Check 6 MWT    Lung mass  -Last scan was almost 11 months ago  -Repeat Chest CT scan   -Based on the repeat CT, may need a PET scan,  biopsy, further monitoring    Hx of CHF, cardiomyopathy  -Follows with cardiology at [facility] in 1 month    Thank you for the opportunity to care for Caron Daniels.    I spent a total of 45 minutes in direct contact with the patient and reviewing pertinent outside records on the day of the encounter.     MARIA C Odonnell DO, MPH  Pulmonary and Critical Care Medicine  [facility]          [1]   Allergies  Allergen Reactions    Amoxicillin UNKNOWN    Clavulanic Acid UNKNOWN   [2]   Current Outpatient Medications on File Prior to Visit   Medication Sig Dispense Refill    Budesonide-Formoterol Fumarate (SYMBICORT) 160-4.5 MCG/ACT Inhalation Aerosol Inhale 2 puffs into the lungs 2 (two) times daily. 10.2 g 3    carvedilol 3.125 MG Oral Tab Take 1 tablet (3.125 mg total) by mouth 2 (two) times daily with meals.      albuterol (PROVENTIL HFA) 108 (90 Base) MCG/ACT Inhalation Aero Soln Inhale 1 puff into the lungs every 4 (four) hours as needed for Wheezing. 1 each 5    ipratropium-albuterol 0.5-2.5 (3) MG/3ML Inhalation Solution Take 3 mL by nebulization every 6 (six) hours as needed. (Patient not taking: Reported on 9/30/2024) 60 each 3    montelukast 10 MG Oral Tab Take 1 tablet (10 mg total) by mouth nightly. 90 tablet 3    atorvastatin 40 MG Oral Tab Take 1 tablet (40 mg total) by mouth nightly. 90 tablet 3    enalapril 2.5 MG Oral Tab Take 1 tablet (2.5 mg total) by mouth 2 (two) times daily. 180 tablet 3    aspirin 81 MG Oral Chew Tab Chew 1 tablet (81 mg total) by mouth daily. 30 tablet 0    furosemide 40 MG Oral Tab Take 1 tablet (40 mg total) by mouth daily.      loratadine 10 MG Oral Tab Take 1 tablet (10 mg total) by mouth daily.      omeprazole 20 MG Oral Capsule Delayed Release Take 1 capsule (20 mg total) by mouth every morning.      Potassium Chloride ER 10 MEQ Oral Tab CR Take 2 tablets (20 mEq total) by mouth daily.       No current facility-administered  medications on file prior to visit.

## 2025-02-18 NOTE — TELEPHONE ENCOUNTER
Spoke with patient and confirmed last imaging completed was in March of 2024. Patient does not have recent imaging.

## 2025-02-18 NOTE — TELEPHONE ENCOUNTER
Spoke with patient. Appointment scheduled for 2/19/25 at 1PM. Confirmed date, time, place, and where to park. Patient verbalized understanding.

## 2025-02-19 ENCOUNTER — OFFICE VISIT (OUTPATIENT)
Dept: PULMONOLOGY | Facility: CLINIC | Age: 65
End: 2025-02-19
Payer: COMMERCIAL

## 2025-02-19 VITALS
HEIGHT: 64 IN | SYSTOLIC BLOOD PRESSURE: 139 MMHG | OXYGEN SATURATION: 99 % | DIASTOLIC BLOOD PRESSURE: 78 MMHG | WEIGHT: 193 LBS | HEART RATE: 83 BPM | BODY MASS INDEX: 32.95 KG/M2

## 2025-02-19 DIAGNOSIS — J44.9 CHRONIC OBSTRUCTIVE PULMONARY DISEASE, UNSPECIFIED COPD TYPE (HCC): ICD-10-CM

## 2025-02-19 DIAGNOSIS — R91.8 LUNG MASS: Primary | ICD-10-CM

## 2025-02-19 PROCEDURE — 3075F SYST BP GE 130 - 139MM HG: CPT | Performed by: INTERNAL MEDICINE

## 2025-02-19 PROCEDURE — 3078F DIAST BP <80 MM HG: CPT | Performed by: INTERNAL MEDICINE

## 2025-02-19 PROCEDURE — 3008F BODY MASS INDEX DOCD: CPT | Performed by: INTERNAL MEDICINE

## 2025-02-19 PROCEDURE — 99204 OFFICE O/P NEW MOD 45 MIN: CPT | Performed by: INTERNAL MEDICINE

## 2025-02-19 NOTE — PATIENT INSTRUCTIONS
Continue the Symbicort 2 puffs twice a day. Make sure to rinse mouth and gargle with water and spit after each use.     Continue to use Albuterol inhaler 2 puffs up to every 6 hours IF NEEDED for coughing, wheezing, chest congestion, or shortness of breath.     Schedule the following tests:    -PULMONARY FUNCTION TEST    -CHEST CT SCAN    We will perform a 6 MINUTE WALK TEST today to see if you need supplemental oxygen.      Come back in one month.

## 2025-02-20 ENCOUNTER — OFFICE VISIT (OUTPATIENT)
Dept: INTERNAL MEDICINE CLINIC | Facility: CLINIC | Age: 65
End: 2025-02-20
Payer: COMMERCIAL

## 2025-02-20 VITALS
OXYGEN SATURATION: 95 % | HEART RATE: 91 BPM | TEMPERATURE: 97 F | SYSTOLIC BLOOD PRESSURE: 130 MMHG | DIASTOLIC BLOOD PRESSURE: 76 MMHG | BODY MASS INDEX: 33.97 KG/M2 | HEIGHT: 64 IN | WEIGHT: 199 LBS

## 2025-02-20 DIAGNOSIS — F10.91 ALCOHOL USE DISORDER IN REMISSION: ICD-10-CM

## 2025-02-20 DIAGNOSIS — E66.09 CLASS 1 OBESITY DUE TO EXCESS CALORIES WITH SERIOUS COMORBIDITY AND BODY MASS INDEX (BMI) OF 34.0 TO 34.9 IN ADULT: ICD-10-CM

## 2025-02-20 DIAGNOSIS — I50.9 CONGESTIVE HEART FAILURE, UNSPECIFIED HF CHRONICITY, UNSPECIFIED HEART FAILURE TYPE (HCC): ICD-10-CM

## 2025-02-20 DIAGNOSIS — Z12.11 COLON CANCER SCREENING: ICD-10-CM

## 2025-02-20 DIAGNOSIS — E66.811 CLASS 1 OBESITY DUE TO EXCESS CALORIES WITH SERIOUS COMORBIDITY AND BODY MASS INDEX (BMI) OF 34.0 TO 34.9 IN ADULT: ICD-10-CM

## 2025-02-20 DIAGNOSIS — I42.9 CARDIOMYOPATHY, UNSPECIFIED TYPE (HCC): ICD-10-CM

## 2025-02-20 DIAGNOSIS — R91.8 LUNG MASS: ICD-10-CM

## 2025-02-20 DIAGNOSIS — Z95.810 ICD (IMPLANTABLE CARDIOVERTER-DEFIBRILLATOR) IN PLACE: ICD-10-CM

## 2025-02-20 DIAGNOSIS — Z00.00 HEALTH MAINTENANCE EXAMINATION: Primary | ICD-10-CM

## 2025-02-20 DIAGNOSIS — Z23 NEED FOR SHINGLES VACCINE: ICD-10-CM

## 2025-02-20 DIAGNOSIS — E11.9 TYPE 2 DIABETES MELLITUS WITHOUT COMPLICATION, WITHOUT LONG-TERM CURRENT USE OF INSULIN (HCC): ICD-10-CM

## 2025-02-20 DIAGNOSIS — Z23 NEED FOR PNEUMOCOCCAL VACCINE: ICD-10-CM

## 2025-02-20 DIAGNOSIS — J44.9 CHRONIC OBSTRUCTIVE PULMONARY DISEASE, UNSPECIFIED COPD TYPE (HCC): ICD-10-CM

## 2025-02-20 DIAGNOSIS — Z12.4 SCREENING FOR CERVICAL CANCER: ICD-10-CM

## 2025-02-20 DIAGNOSIS — Z87.891 HISTORY OF TOBACCO USE: ICD-10-CM

## 2025-02-20 DIAGNOSIS — Z91.09 ENVIRONMENTAL ALLERGIES: ICD-10-CM

## 2025-02-20 DIAGNOSIS — E78.5 HYPERLIPIDEMIA, UNSPECIFIED HYPERLIPIDEMIA TYPE: ICD-10-CM

## 2025-02-20 PROBLEM — K13.79 MOUTH SORE: Status: RESOLVED | Noted: 2025-01-16 | Resolved: 2025-02-20

## 2025-02-20 PROCEDURE — 87624 HPV HI-RISK TYP POOLED RSLT: CPT | Performed by: FAMILY MEDICINE

## 2025-02-20 NOTE — ASSESSMENT & PLAN NOTE
Patient with COPD.  Continue Symbicort 160-4.5 mcg 2 puffs twice daily.  Also on montelukast given history of allergies.  Ipratropium-albuterol nebulizer solutions as needed.  Albuterol as needed  Follow-up as needed.

## 2025-02-20 NOTE — ASSESSMENT & PLAN NOTE
Patient with a history of lung mass  There are concerns for possible malignancy  Now seeing pulmonology  Has CT chest ordered

## 2025-02-20 NOTE — ASSESSMENT & PLAN NOTE
Exercise and diet advised.  Labs reviewed in office  Tdap today.  Shingles vaccine today  Prevnar 20 - she prefers both shingles and prevnar today at same time  Advanced directive information provided.  Cologuard ordered  Pap smear performed.  Mammogram ordered.

## 2025-02-20 NOTE — ASSESSMENT & PLAN NOTE
Patient with type 2 diabetes.  Her A1c is currently at goal of less than 7.  Prevnar 20 vaccine  Follows with optometry - needs diabetic eye exam  Follow up in 6 months

## 2025-02-20 NOTE — PATIENT INSTRUCTIONS
PATIENT INSTRUCTIONS    Thank you for seeing me today, it was a pleasure taking care of you.  Please check out at the  and schedule a follow up appointment.  Return in about 6 months (around 8/20/2025) for follow up.  Please remember that the preferred rocio period for appointments is 5 minutes. This is to help maximize the amount of time that we can spend together at our visits.    The following imaging studies were ordered: Mammogram, CT chest (ASAP)  Please call [phone] to schedule your imaging appointment.   Please also follow up with the following specialists: Pulmonology, cardiology, may need to see oncology  Please fill out the advance directive information (power of  documents) and bring a copy to our clinic.  Cologuard stool test - mailed to your home  Continue try to focus on a healthy diet and get some basic exercise  Eye doctor  Please have the doctor fax his/her note and examination to our office at [phone].    Dr. Kvng Garcia

## 2025-02-20 NOTE — PROGRESS NOTES
FAMILY MEDICINE CLINIC NOTE    HPI  Caron Daniels is a 64 year old female presenting for physical    #Health Maintenance  -Diet: Eating too much  -Exercise: Not much exercise  -Lung cancer screen: Indicated  -Colon cancer screen: - will order cologuard  -Statin:  - On atorvastatin  -ASA: Not indicated  -Breast cancer screen: Indicated  -Breast cancer medication to reduce risk: Declines   -Periods: Menopause.   -Cervical cancer screen: Indicated  -DEXA: Not indicated  -BRCA: Not indicated  -Intimate partner violence: Denies abuse  -HIV screen: Indicated - consider future screen  -Hep C screen: - 1/2025 negative  -Gonorrhea/chlamydia:  Not Indicated  -Syphillis: Not indicated  -TB: Not indicated  -Tobacco/alcohol: Per below  -Depression: PHQ-2 score of 0 (score >/= 3 do PHQ-9)  -Advanced Directive: Indicated    #Immunizations  -Tdap: Indicated  -Flu shot:  11/2024  -PCV13: Not indicated   -PCV20: Indicated   -PPSV23: Not indicated   -HPV: Not indicated  -RZV (preferred) or VZL: Indicated   -RSV:  Optional     -COVID:  11/2024       #DM  -Hba1c: 6.6 3/17/2024, 5.5 1/16/25  -Microalbuminuria: 1/2025  -B12: Not indicated  -Pneumonia vaccine: Indicated  -HepB: Declines  -Eye exam: Saw an optometrist Frank Guadalupe - has plans for diabetic eye exam  -Diabetic foot exam: 1/16/25 Bilateral barefoot skin diabetic exam completed, visualized feet and the appearance shows very dry skin. Bilateral monofilament/sensation of both feet is normal. Pulsation pedal pulse exam of both lower legs/feet is normal as well.  -Current medications: Not on blood sugar medication   -Blood sugars:  -AM:      -Noon:   -PM:      -hypoglycemia:     #HLD  -now on atorvastatin 40 mg nightly     #Lung mass  -pulmonlogy Dr Odonnell  -oncology Dr Cole  -CT chest ordered by pulmonology      #COPD  -albuterol - 2 puffs 3 times daily  -symbicort 160-4.5 mcg 2 puffs twice daily  -ipratropium-albuterol nebulizer    -montelukast 10 mg nightly   -PFT  ordered by pulmonology        #CHF  #Cardiomyopathy   #ICD in place  -cardiology Dr Coretta Toledo  -enalapril 5 mg in the morning and 2.5 mg nightly   -carvedilol 3.125 mg twice daily   -furosemide 40 mg daily   -potassium 20 meq daily - using intermittently  -aspirin 81 mg daily   -atorvastatin 40 mg nightly     #Environmental allergies  -taking claritin everyday  -montelukast 10 mg nightly    #GERD  -prilosec 20 mg daily      #Alcohol use disorder, in remission  -sober since 2014       #Patient Care Team  Patient Care Team:  Deondre Calderon MD as PCP - General (Family Medicine)  Mandi Pugh (OPTOMETRY)  Edil Odonnell DO (PULMONARY DISEASES)  Elías Cole MD (Hematology and Oncology)    ROS  GENERAL: No fever/chills, no recent weight loss   HEENT: No visual changes, no changes in hearing, no sore throats  NECK: No pain, no swelling  RESP: No cough, no SOB  CV: No chest pain, no palpitations  GI: No abd pain, no N/V/D  MSK: No edema, no pain  SKIN: No new rashes  NEURO: No numbness, no tingling, no HA    HEALTH MAINTENANCE CHECKLIST  Health Maintenance Topics with due status: Overdue       Topic Date Due    Annual Physical Never done    Diabetes Care Dilated Eye Exam Never done    Colorectal Cancer Screening Never done    Mammogram Never done    Pneumococcal Vaccine: 50+ Years Never done    DTaP,Tdap,and Td Vaccines Never done    Pap Smear Never done    Zoster Vaccines Never done    Lung Cancer Screening Never done    Annual Depression Screening 01/01/2025       ALLERGIES  Allergies[1]    MEDICATIONS  Current Outpatient Medications   Medication Sig Dispense Refill    Budesonide-Formoterol Fumarate (SYMBICORT) 160-4.5 MCG/ACT Inhalation Aerosol Inhale 2 puffs into the lungs 2 (two) times daily. 10.2 g 3    carvedilol 3.125 MG Oral Tab Take 1 tablet (3.125 mg total) by mouth 2 (two) times daily with meals.      albuterol (PROVENTIL HFA) 108 (90 Base) MCG/ACT Inhalation Aero Soln Inhale 1  puff into the lungs every 4 (four) hours as needed for Wheezing. 1 each 5    ipratropium-albuterol 0.5-2.5 (3) MG/3ML Inhalation Solution Take 3 mL by nebulization every 6 (six) hours as needed. 60 each 3    montelukast 10 MG Oral Tab Take 1 tablet (10 mg total) by mouth nightly. 90 tablet 3    atorvastatin 40 MG Oral Tab Take 1 tablet (40 mg total) by mouth nightly. 90 tablet 3    enalapril 2.5 MG Oral Tab Take 1 tablet (2.5 mg total) by mouth 2 (two) times daily. 180 tablet 3    aspirin 81 MG Oral Chew Tab Chew 1 tablet (81 mg total) by mouth daily. 30 tablet 0    furosemide 40 MG Oral Tab Take 1 tablet (40 mg total) by mouth daily.      loratadine 10 MG Oral Tab Take 1 tablet (10 mg total) by mouth daily.      omeprazole 20 MG Oral Capsule Delayed Release Take 1 capsule (20 mg total) by mouth every morning.      Potassium Chloride ER 10 MEQ Oral Tab CR Take 2 tablets (20 mEq total) by mouth daily.         ACTIVE PROBLEM  Patient Active Problem List   Diagnosis    COPD (chronic obstructive pulmonary disease) ([facility])    Lung mass    History of tobacco use    ICD (implantable cardioverter-defibrillator) in place    Alcohol use disorder in remission    CHF (congestive heart failure) ([facility])    Cardiomyopathy ([facility])    Type 2 diabetes mellitus without complication, without long-term current use of insulin ([facility])    Environmental allergies    Hyperlipidemia    Health maintenance examination    Class 1 obesity due to excess calories with serious comorbidity and body mass index (BMI) of 34.0 to 34.9 in adult       PAST MEDICAL HISTORY  Past Medical History:    Alcohol use disorder in remission    Cardiomyopathy ([facility])    CHF (congestive heart failure) ([facility])    COPD (chronic obstructive pulmonary disease) ([facility])    Type 2 diabetes mellitus without complication, without long-term current use of insulin ([facility])       PAST SOCIAL HISTORY  Social History     Socioeconomic History    Marital status:      Spouse name: Not on file     Number of children: Not on file    Years of education: Not on file    Highest education level: Not on file   Occupational History    Not on file   Tobacco Use    Smoking status: Former     Current packs/day: 0.00     Average packs/day: 3.5 packs/day for 55.0 years (192.5 ttl pk-yrs)     Types: Cigarettes, Cigars     Start date: 1969     Quit date: 2024     Years since quittin.9    Smokeless tobacco: Never   Vaping Use    Vaping status: Never Used   Substance and Sexual Activity    Alcohol use: No     Comment: formerly drank heavily; sober since 15    Drug use: Not Currently     Types: Cannabis    Sexual activity: Not Currently     Partners: Male   Other Topics Concern    Caffeine Concern Not Asked    Exercise Not Asked    Seat Belt Not Asked    Special Diet Not Asked    Stress Concern Not Asked    Weight Concern Not Asked   Social History Narrative    Relationships:  - Gene    Children: Jaimie (adult M)    Pets: Dog and Cat    School: N/A    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    Interests: Watches TV, playing game on phone.     Spiritual: Pentecostal - goes to [employer] in [place].      Social Drivers of Health     Food Insecurity: No Food Insecurity (3/17/2024)    Food Insecurity     Food Insecurity: Never true   Transportation Needs: No Transportation Needs (3/17/2024)    Transportation Needs     Lack of Transportation: No   Stress: Not on file   Housing Stability: Low Risk  (3/17/2024)    Housing Stability     Housing Instability: No     Housing Instability Emergency: Not on file     Crib or [place]inette: Not on file       PAST SURGICAL HISTORY  Past Surgical History:   Procedure Laterality Date    Conization cervix,knife/laser       insert dual electrode pmkr or icd         PAST FAMILY HISTORY  Family History   Problem Relation Age of Onset    Obesity Mother     Heart Attack Mother     Hypertension Father     Other (Parkinsons) Father      Arrhythmia Brother     No Known Problems Brother     Dementia Maternal Grandmother     Heart Attack Maternal Grandfather     Hypertension Paternal Grandmother     Heart Attack Paternal Grandmother     No Known Problems Paternal Grandfather     Breast Cancer Neg     Colon Cancer Neg        PHYSICAL EXAM  Vitals:    02/20/25 0953   BP: 130/76   Pulse: 91   Temp: 97.4 °F (36.3 °C)   SpO2: 95%   Weight: 199 lb (90.3 kg)   Height: 5' 4\" (1.626 m)      Body mass index is 34.16 kg/m².    GENERAL: NAD  HEENT: Moist mucous membranes, no tonsillar swelling or erythema, PERRLA bilat, TM translucent and non-bulging  NECK: Supple, non-tender  RESP: CTAB, no wheezing, no rales, no rhonchi  CV: RRR, no murmurs  GI: Soft, non-distended, non-tender, no guarding, no rebound, no masses  : No labial lesions. Vaginal mucosa is dry. Cervix visualized, pap smear performed. Examination and procedure completed with female medical assistant Jyoti in room.   MSK: No edema  SKIN: Warm and dry, no rashes  NEURO: Answering questions appropriately    LABS  Lab Results   Component Value Date    WBC 9.1 01/16/2025    HGB 13.4 01/16/2025    HCT 40.3 01/16/2025    .0 01/16/2025    NEPERCENT 65.6 01/16/2025    LYPERCENT 18.3 01/16/2025    MOPERCENT 10.8 01/16/2025    EOPERCENT 4.1 01/16/2025    BAPERCENT 1.1 01/16/2025    NE 5.97 01/16/2025    LYMABS 1.66 01/16/2025    MOABSO 0.98 01/16/2025    EOABSO 0.37 01/16/2025    BAABSO 0.10 01/16/2025       Lab Results   Component Value Date     (H) 01/16/2025    K 3.9 01/16/2025     01/16/2025    CO2 32.0 01/16/2025    ANIONGAP 9 01/16/2025    BUN 16 01/16/2025    CREATSERUM 0.90 01/16/2025    BUNCREA 17.8 01/16/2025    GLU 94 01/16/2025    CA 9.4 01/16/2025    OSMOCALC 303 (H) 01/16/2025    GFRNAA >60 03/14/2017    GFRAA >60 03/14/2017    ALT 11 01/16/2025    AST 14 01/16/2025    ALKPHO 64 01/16/2025    BILT <0.2 (L) 01/16/2025    TP 7.1 01/16/2025    ALB 4.7 01/16/2025    GLOBULIN 2.4  01/16/2025    ELECTAG 2.0 01/16/2025    FASTING No 01/16/2025    FASTING No 01/16/2025         Lab Results   Component Value Date    CHOLEST 173 01/16/2025    TRIG 119 01/16/2025    HDL 62 (H) 01/16/2025    LDL 90 01/16/2025    VLDL 19 01/16/2025    NONHDLC 111 01/16/2025        DIAGNOSTICS    ASSESSMENT/PLAN  Problem List Items Addressed This Visit          [facility] Problems    Cardiomyopathy ([facility])     Patient with a history of cardiomyopathy and congestive heart failure.  She follows with cardiology.  Currently on enalapril 5 mg in the morning and 2.5 mg at night  Also on carvedilol 3.125 mg twice daily.  Her blood pressures are normal in the office today  She is also on furosemide 40 mg daily for swelling  Currently also on aspirin 81 mg daily.         CHF (congestive heart failure) ([facility])     Patient with a history of cardiomyopathy and congestive heart failure.  She follows with cardiology.  Currently on enalapril 5 mg in the morning and 2.5 mg at night  Also on carvedilol 3.125 mg twice daily.  Her blood pressures are normal in the office today  She is also on furosemide 40 mg daily for swelling  Currently also on aspirin 81 mg daily.         COPD (chronic obstructive pulmonary disease) ([facility])     Patient with COPD.  Continue Symbicort 160-4.5 mcg 2 puffs twice daily.  Also on montelukast given history of allergies.  Ipratropium-albuterol nebulizer solutions as needed.  Albuterol as needed  Follow-up as needed.         Type 2 diabetes mellitus without complication, without long-term current use of insulin ([facility])     Patient with type 2 diabetes.  Her A1c is currently at goal of less than 7.  Prevnar 20 vaccine  Follows with optometry - needs diabetic eye exam  Follow up in 6 months            Cardiac and Vasculature    Hyperlipidemia     Continue atorvastatin 40 mg nightly.         ICD (implantable cardioverter-defibrillator) in place     Follows with cardiology            Pulmonary and Pneumonias    Lung mass      Patient with a history of lung mass  There are concerns for possible malignancy  Now seeing pulmonology  Has CT chest ordered            Endocrine and Metabolic    Class 1 obesity due to excess calories with serious comorbidity and body mass index (BMI) of 34.0 to 34.9 in adult     Healthy diet and exercise advised            Mental Health    Alcohol use disorder in remission     Patient with a history of alcohol use disorder in remission since 2014  Continue alcohol cessation.            Allergies and Adverse Reactions    Environmental allergies     Continue loratadine 10 mg daily and montelukast 10 mg nightly  Better controlled            Tobacco    History of tobacco use     Continue smoking cessation.            Health Encounters    Health maintenance examination - Primary     Exercise and diet advised.  Labs reviewed in office  Tdap today.  Shingles vaccine today  Prevnar 20 - she prefers both shingles and prevnar today at same time  Advanced directive information provided.  Cologuard ordered  Pap smear performed.  Mammogram ordered.         Relevant Orders    [facility] NORA 2D+3D SCREENING BILAT (CPT=77067/63179)    TETANUS, DIPHTHERIA TOXOIDS AND ACELLULAR PERTUSIS VACCINE (TDAP), >7 YEARS, IM USE    ZOSTER VACC RECOMBINANT IM NJX    PCV20 VACCINE FOR INTRAMUSCULAR USE    ThinPrep PAP Smear    Hpv Dna  High Risk , Thin Prep Collect     Other Visit Diagnoses       Colon cancer screening        Relevant Orders    COLOGUARD COLON CANCER SCREENING (EXTERNAL)    Need for pneumococcal vaccine        Relevant Orders    PCV20 VACCINE FOR INTRAMUSCULAR USE    Need for shingles vaccine        Relevant Orders    ZOSTER VACC RECOMBINANT IM NJX    Screening for cervical cancer        Relevant Orders    ThinPrep PAP Smear    Hpv Dna  High Risk , Thin Prep Collect            Return in about 6 months (around 8/20/2025) for follow up.    Deondre Calderon MD  Family Medicine         [1] No Active Allergies

## 2025-02-21 LAB — HPV E6+E7 MRNA CVX QL NAA+PROBE: NEGATIVE

## 2025-03-08 ENCOUNTER — HOSPITAL ENCOUNTER (OUTPATIENT)
Dept: CT IMAGING | Facility: HOSPITAL | Age: 65
Discharge: HOME | End: 2025-03-08
Attending: INTERNAL MEDICINE
Payer: COMMERCIAL

## 2025-03-08 DIAGNOSIS — R91.8 LUNG MASS: ICD-10-CM

## 2025-03-08 PROCEDURE — 71250 CT THORAX DX C-: CPT | Performed by: INTERNAL MEDICINE

## 2025-03-12 ENCOUNTER — TELEPHONE (OUTPATIENT)
Dept: PULMONOLOGY | Facility: CLINIC | Age: 65
End: 2025-03-12

## 2025-03-12 DIAGNOSIS — J44.9 CHRONIC OBSTRUCTIVE PULMONARY DISEASE, UNSPECIFIED COPD TYPE (HCC): ICD-10-CM

## 2025-03-12 RX ORDER — IPRATROPIUM BROMIDE AND ALBUTEROL SULFATE 2.5; .5 MG/3ML; MG/3ML
SOLUTION RESPIRATORY (INHALATION) EVERY 6 HOURS PRN
Qty: 180 ML | Refills: 0 | Status: SHIPPED | OUTPATIENT
Start: 2025-03-12

## 2025-03-12 NOTE — TELEPHONE ENCOUNTER
LOV:2/20/25  Last refill:6/19/24    Next Appt: With Internal Medicine (Deondre Calderon MD)  08/20/2025 at 9:00 AM

## 2025-03-12 NOTE — TELEPHONE ENCOUNTER
Patient requesting to speak with Pulmonologist regarding CT Scan results.  Please call.  Thank you.

## 2025-03-12 NOTE — TELEPHONE ENCOUNTER
Spoke with patient and informed awaiting results to be interpreted and once reviewed by Dr. Odonnell she will get call back.

## 2025-03-15 DIAGNOSIS — R91.8 LUNG NODULES: Primary | ICD-10-CM

## 2025-03-18 NOTE — PROGRESS NOTES
Pulmonary Medicine Outpatient Progress Note           Reason for Consultation and CC: lung mass, COPD     Referring Physician: Dr. Calderon       Subjective:  Seen for f/u on 3/19/25.   Completed a chest CT showed enlarging lung nodules. Advised to get a PET scan and plan to schedule a biopsy.   Also ordered PFTs not done yet.   She hasn't had these tests done and reports financial problems and cost as the main reason. Wants to wait until she turns 66 and has medicare. I strongly advised her against this as her repeat CT already shows the nodules are increasing and very concerning for malignancy. I relayed this to her. She is willing to have either the PET scan or the biopsy. I told her the biopsy is more important.  I began discussing performing a bronchoscopy with general anesthesia at which point she told me she's had very bad experiences with anesthesia in the past and is very reluctant to undergo general anesthesia. I told her the alternative is to see if interventional radiology can perform a CT guided biopsy. She seems to be willing to undergo this type of biopsy.   She remains on Symbicort (160/4.5) 2 puffs BID (for the past month), Albuterol she uses 4-8 hrs for shortness of breath, chest tightness, wheezing and hx of COPD.  Symptoms consist of coughing, shortness of breath  No wheezing, chest congestion  Has seen Dr. Cole from Oncology in the past.      From the initial consultation  HPI: I had the pleasure of seeing Caron Daniels for a Pulmonary Medicine consultation on 2/19/25.  63 yo woman with hx of ETOH in remission, cardiomyopathy, CHF, COPD, DM2, lung mass previously saw [facility] (Dr. Jani Alfonso) while hospitalized in 3/2024. Last imaging was in 3/2024 which confirmed the lung mass. Pt was w/o insurance and had no further imaging since. Took social security early to pay for insurance starting 1/2025. Now here for further pulmonary evaluation.  On Symbicort (160/4.5) 2 puffs BID (for the past  month), Albuterol she uses 3-5 hrs for shortness of breath, chest tightness, wheezing and hx of COPD.  Uses Duoneb in the morning.   Not on supplemental oxygen.  Estimates last PFTs were in .    Follows with cardiology @ [place]        REVIEW OF SYSTEMS:  Positives and negatives as stated in HPI. Remainder of 12 pt review of systems otherwise are negative.       PAST MEDICAL HISTORY:  Past Medical History:    Alcohol use disorder in remission    Cardiomyopathy (HCC)    CHF (congestive heart failure) (HCC)    COPD (chronic obstructive pulmonary disease) (HCC)    Type 2 diabetes mellitus without complication, without long-term current use of insulin (HCC)          PAST SURGICAL HISTORY:  Past Surgical History:   Procedure Laterality Date    Conization cervix,knife/laser      Hc insert dual electrode pmkr or icd            PAST FAMILY HISTORY:  Family History   Problem Relation Age of Onset    Obesity Mother     Heart Attack Mother     Hypertension Father     Other (Parkinsons) Father     Arrhythmia Brother     No Known Problems Brother     Dementia Maternal Grandmother     Heart Attack Maternal Grandfather     Hypertension Paternal Grandmother     Heart Attack Paternal Grandmother     No Known Problems Paternal Grandfather     Breast Cancer Neg     Colon Cancer Neg           PAST SOCIAL HISTORY:  Social History     Socioeconomic History    Marital status:    Tobacco Use    Smoking status: Former     Current packs/day: 0.00     Average packs/day: 3.5 packs/day for 55.0 years (192.5 ttl pk-yrs)     Types: Cigarettes, Cigars     Start date: 1969     Quit date: 2024     Years since quittin.0    Smokeless tobacco: Never   Vaping Use    Vaping status: Never Used   Substance and Sexual Activity    Alcohol use: No     Comment: formerly drank heavily; sober since 15    Drug use: Not Currently     Types: Cannabis    Sexual activity: Not Currently     Partners: Male   Social History Narrative     Relationships:  - Gene    Children: Jaimie (adult M)    Pets: Dog and Cat    School: N/A    Work: Works for [employer]    Origin: Born in [place].    Interests: Watches TV, playing game on phone.     Spiritual: Anabaptist - goes to [employer] in [place].      Social Drivers of Health     Food Insecurity: No Food Insecurity (3/17/2024)    Food Insecurity     Food Insecurity: Never true   Transportation Needs: No Transportation Needs (3/17/2024)    Transportation Needs     Lack of Transportation: No   Housing Stability: Low Risk  (3/17/2024)    Housing Stability     Housing Instability: No   Quit smoking in 3/2024. Smoked for 55 yrs (started at age 9) @ 1 PPD  Lives with   Has a dog and cat  Employed: works at [employer]      ALLERGIES:  Allergies[1]  Augmentin-makes stomach hurt       MEDS:  Medications Ordered Prior to Encounter[2]       PHYSICAL EXAM:  /76   Pulse 87   Resp 16   Ht 5' 4\" (1.626 m)   Wt 195 lb (88.5 kg)   SpO2 98%   BMI 33.47 kg/m²   CONSTITUTIONAL: alert, oriented, no apparent distress  HEENT: atraumatic normocephalic  MOUTH: mucous membranes are moist. No OP exudates  NECK/THROAT: no JVD. Trachea midline. No obvious thyromegaly  LUNG: diminished BS but clear b/l no wheezing, crackles. Chest symmetric with respiratory motion  HEART: regular rate and rhythm, no obvious murmers or gallops note  ABD: soft non tender. + bowel sounds. No organomegaly noted  EXT: no clubbing, cyanosis, or edema noted. Pulses intact grossly  NEURO/MUSCULOSKELETAL: no gross deficits  SKIN: warm, dry. No obvious lesions noted  LYMPH: no obvious LAD       IMAGES:  Chest CT 3/14/25  CONCLUSION:   1. Spiculated right lower lobe mass measuring 4.2 x 2.3 cm, as well as additional pulmonary nodules in the right upper and lower lobes have slightly increased in size compared to 03/17/2024 exam.  There is also a stable mildly enlarged subcarinal lymph node.  Differential  considerations include a malignant process such as a primary right lower lobe lung cancer with ipsilateral lung and mediastinal waqar metastases, as well as an atypical inflammatory/granulomatous process or atypical infectious process (such as fungal pneumonia).  Further assessment with a PET/CT and/or percutaneous biopsy is recommended.   2. Moderate emphysema, mild bronchial wall thickening and mucous plugging at the lung bases are grossly unchanged, the latter of which may relate to chronic inflammatory bronchiolitis/small airways infection or chronic aspiration.  Right lower lobe   ground-glass opacity seen on the previous CT that likely related to inflammatory bronchiolitis has resolved.   3. Stable cardiomegaly in this patient with a cardiac AICD.   4. Lesser incidental findings as above.     Chest CT 3/2024  No evidence of acute pulmonary embolism to the level of the segmental pulmonary artery branches   Spiculated 3.8 cm right lower lobe pulmonary mass.  This is concerning for a primary lung neoplasia, with infection being considered less likely. Recommend further assessment with tissue sampling.   Multiple additional pulmonary nodules, which of which are spiculated measuring up to 1.5 cm, concerning for sites sites of pulmonary metastatic disease.   A 2.1 cm ground-glass and reticular opacity of the right lower lobe may be infectious or inflammatory in etiology, with primary lung neoplasia being within the differential.  Further assessment with tissue sampling and/or FDG PET-CT would be helpful.   Bilateral hilar and subcarinal lymph nodes measuring up to 1.1 cm.  These are concerning for waqar metastatic disease. Reactive etiologies a differential consideration.        LABS:  Lab Results   Component Value Date    WBC 9.1 01/16/2025    RBC 4.46 01/16/2025    HGB 13.4 01/16/2025    HCT 40.3 01/16/2025    MCV 90.4 01/16/2025    MCH 30.0 01/16/2025    MCHC 33.3 01/16/2025    MPV 10.3 03/14/2017     No results  for input(s): \"GLU\", \"BUN\", \"CREATSERUM\", \"GFRAA\", \"GFRNAA\", \"EGFRCR\", \"CA\", \"ALB\", \"NA\", \"K\", \"CL\", \"CO2\", \"ALKPHO\", \"AST\", \"ALT\", \"BILT\", \"TP\" in the last 168 hours.       PFTS none    PSG/CPAP titration results:  none       ASSESSMENT/PLAN:  COPD likely at least moderate. Possible exac  -On Symbicort (160/4.5) 2 puffs BID. Advised her to rinse/gargle with water and spit after each use (pt was doing it 30-40 min later)  -Continue PRN Albuterol HFA and Duoneb neb  -Fill the prescription for Spiriva Handihaler 2 puffs once a day  -Fill the prescription for Prednisone 10 mg tabs and take with food as follows:  -4 tabs once a day for 2 days then decrease to  -3 tabs once a day for 2 days then decrease to  -2 tabs once a day for 2 days then decrease to  -1 tab once a day for 2 days  -Check PFTs    Lung mass  -Ordered PET scan but pt reports has financial difficulty to have it done and wants to wait until next year.   -She is willing to have a lung biopsy done but wants to avoid general anesthesia b/c of previous bad experiences with general anesthesia. Discussed option of interventional radiology performing a CT guided lung biopsy  -I emphasized the need to have the enlarging lung nodules evaluated as soon as possible    Hx of CHF, cardiomyopathy  -Follows with cardiology at [facility] in 3 months    Thank you for the opportunity to care for Caron Daniels.    I spent a total of 50 minutes in direct contact with the patient and reviewing pertinent outside records on the day of the encounter and planning for next steps (as noted below).     MARIA C Odonnell DO, MPH  Pulmonary and Critical Care Medicine  [facility] Pulmonary and Critical Care Medicine       ADDENDUM  I spoke with IR later this afternoon and called [facility]  Sent her a message stating the following:    Hi Ms. Bagnuolo,     I just called and left a message on your phone.      I spoke with our interventional radiologist  this afternoon. He said they can perform a biopsy of the largest nodule in your lung but the risk for a lung collapse (or pneumothorax is the medical term) will be around 20%. They don't use general anesthesia for the procedure.   If you elect to have the lung biopsy performed by us, then we do it under general anesthesia but the risk for a lung collapse is much less since we go in through your airways vs from outside the lung.   Please let us know soon which way you would feel comfortable having the biopsy done. Again the options at this time are:  -A biopsy by us using a scope going into your airways under general anesthesia but has less risk for a lung collapse. We would also be able to biopsy your lymph nodes in the middle of the chest to check if they have any potential malignant cells.   -A biopsy by the interventional radiologist which does not require general anesthesia but has a risk of around 20% for a lung collapse.     As soon as you let us know your decision, we can work on scheduling. I strongly advise we should perform a biopsy (of some type) soon as possible to get a diagnosis and don't advise waiting until you turn 66.      Thanks,     MARIA C Odonnell DO, MPH  Pulmonary Critical Care Medicine  [facility] Pulmonary and Critical Care Medicine         [1] No Active Allergies  [2]   Current Outpatient Medications on File Prior to Visit   Medication Sig Dispense Refill    IPRATROPIUM-ALBUTEROL 0.5-2.5 (3) MG/3ML Inhalation Solution USE 1 VIAL VIA NEBULIZER EVERY 6 HOURS AS NEEDED 180 mL 0    Budesonide-Formoterol Fumarate (SYMBICORT) 160-4.5 MCG/ACT Inhalation Aerosol Inhale 2 puffs into the lungs 2 (two) times daily. 10.2 g 3    carvedilol 3.125 MG Oral Tab Take 1 tablet (3.125 mg total) by mouth 2 (two) times daily with meals.      albuterol (PROVENTIL HFA) 108 (90 Base) MCG/ACT Inhalation Aero Soln Inhale 1 puff into the lungs every 4 (four) hours as needed for Wheezing. 1 each 5    montelukast  10 MG Oral Tab Take 1 tablet (10 mg total) by mouth nightly. 90 tablet 3    atorvastatin 40 MG Oral Tab Take 1 tablet (40 mg total) by mouth nightly. 90 tablet 3    enalapril 2.5 MG Oral Tab Take 1 tablet (2.5 mg total) by mouth 2 (two) times daily. 180 tablet 3    aspirin 81 MG Oral Chew Tab Chew 1 tablet (81 mg total) by mouth daily. 30 tablet 0    furosemide 40 MG Oral Tab Take 1 tablet (40 mg total) by mouth daily.      loratadine 10 MG Oral Tab Take 1 tablet (10 mg total) by mouth daily.      omeprazole 20 MG Oral Capsule Delayed Release Take 1 capsule (20 mg total) by mouth every morning.      Potassium Chloride ER 10 MEQ Oral Tab CR Take 2 tablets (20 mEq total) by mouth daily.       No current facility-administered medications on file prior to visit.

## 2025-03-19 ENCOUNTER — OFFICE VISIT (OUTPATIENT)
Dept: PULMONOLOGY | Facility: CLINIC | Age: 65
End: 2025-03-19
Payer: COMMERCIAL

## 2025-03-19 ENCOUNTER — PATIENT MESSAGE (OUTPATIENT)
Dept: PULMONOLOGY | Facility: CLINIC | Age: 65
End: 2025-03-19

## 2025-03-19 VITALS
WEIGHT: 195 LBS | OXYGEN SATURATION: 98 % | HEIGHT: 64 IN | SYSTOLIC BLOOD PRESSURE: 107 MMHG | DIASTOLIC BLOOD PRESSURE: 76 MMHG | RESPIRATION RATE: 16 BRPM | HEART RATE: 87 BPM | BODY MASS INDEX: 33.29 KG/M2

## 2025-03-19 DIAGNOSIS — R91.8 LUNG NODULES: ICD-10-CM

## 2025-03-19 DIAGNOSIS — J44.9 CHRONIC OBSTRUCTIVE PULMONARY DISEASE, UNSPECIFIED COPD TYPE (HCC): Primary | ICD-10-CM

## 2025-03-19 PROCEDURE — 99215 OFFICE O/P EST HI 40 MIN: CPT | Performed by: INTERNAL MEDICINE

## 2025-03-19 RX ORDER — METHYLPREDNISOLONE 4 MG/1
8 TABLET ORAL
Status: CANCELLED | OUTPATIENT
Start: 2025-03-20 | End: 2025-03-21

## 2025-03-19 RX ORDER — METHYLPREDNISOLONE 4 MG/1
4 TABLET ORAL
Status: CANCELLED | OUTPATIENT
Start: 2025-03-23 | End: 2025-03-24

## 2025-03-19 RX ORDER — METHYLPREDNISOLONE 4 MG/1
4 TABLET ORAL
Status: CANCELLED | OUTPATIENT
Start: 2025-03-22 | End: 2025-03-23

## 2025-03-19 RX ORDER — METHYLPREDNISOLONE 4 MG/1
8 TABLET ORAL
Status: CANCELLED | OUTPATIENT
Start: 2025-03-19 | End: 2025-03-20

## 2025-03-19 RX ORDER — TIOTROPIUM BROMIDE 18 UG/1
18 CAPSULE ORAL; RESPIRATORY (INHALATION) DAILY
Qty: 30 CAPSULE | Refills: 5 | Status: SHIPPED | OUTPATIENT
Start: 2025-03-19

## 2025-03-19 RX ORDER — PREDNISONE 10 MG/1
TABLET ORAL
Qty: 20 TABLET | Refills: 0 | Status: SHIPPED | OUTPATIENT
Start: 2025-03-19

## 2025-03-19 RX ORDER — METHYLPREDNISOLONE 4 MG/1
4 TABLET ORAL
Status: CANCELLED | OUTPATIENT
Start: 2025-03-21 | End: 2025-03-22

## 2025-03-19 RX ORDER — METHYLPREDNISOLONE 4 MG/1
4 TABLET ORAL NIGHTLY
Status: CANCELLED | OUTPATIENT
Start: 2025-03-23 | End: 2025-03-24

## 2025-03-19 RX ORDER — METHYLPREDNISOLONE 4 MG/1
4 TABLET ORAL
Status: CANCELLED | OUTPATIENT
Start: 2025-03-19 | End: 2025-03-19

## 2025-03-19 RX ORDER — LOTEPREDNOL ETABONATE 5 MG/ML
1 SUSPENSION/ DROPS OPHTHALMIC 4 TIMES DAILY
COMMUNITY

## 2025-03-19 RX ORDER — METHYLPREDNISOLONE 4 MG/1
4 TABLET ORAL
Status: CANCELLED | OUTPATIENT
Start: 2025-03-20 | End: 2025-03-21

## 2025-03-19 RX ORDER — METHYLPREDNISOLONE 4 MG/1
4 TABLET ORAL
Status: CANCELLED | OUTPATIENT
Start: 2025-03-24 | End: 2025-03-25

## 2025-03-19 RX ORDER — METHYLPREDNISOLONE 4 MG/1
8 TABLET ORAL
Status: CANCELLED | OUTPATIENT
Start: 2025-03-19 | End: 2025-03-19

## 2025-03-19 NOTE — PATIENT INSTRUCTIONS
Continue the Symbicort inhaler 2 puffs twice a day    Fill the prescription for Spiriva. Take 2 puffs once a day in the afternoon. Make sure to rinse mouth and gargle with water and spit after each use. Please notify us if there are any insurance or pharmacy coverage issues for the medication    Continue the Albuterol as needed    Fill the prescription for Prednisone 10 mg tabs and take with food as follows:    -4 tabs once a day for 2 days then decrease to  -3 tabs once a day for 2 days then decrease to  -2 tabs once a day for 2 days then decrease to  -1 tab once a day for 2 days.     Schedule the Pulmonary Function Testing    I will discuss your CT scan with Interventional Radiology and see if they can perform a biopsy of the nodules. If they can, I will let you know and place an order for you to call and schedule it    Come back in 3 months.

## 2025-03-19 NOTE — TELEPHONE ENCOUNTER
Confirmed patient viewed [email or web address] message sent by Dr. Odonnell with result notes from CT chest. She had appointment today with Dr. Odonnell.

## 2025-03-26 NOTE — TELEPHONE ENCOUNTER
Per patient she wants to wait and do PFT in a few weeks since prednisone helped. Patient states she will follow up in the next couple days regarding biopsy. She was given the phone number for [facility] as she expressed interest in the cost of biopsies.

## 2025-04-23 ENCOUNTER — PATIENT MESSAGE (OUTPATIENT)
Dept: PULMONOLOGY | Facility: CLINIC | Age: 65
End: 2025-04-23

## 2025-04-23 DIAGNOSIS — R91.8 LUNG NODULES: Primary | ICD-10-CM

## 2025-04-24 NOTE — TELEPHONE ENCOUNTER
Edil Odonnell, DO to [facility] Clinical Staff  (Selected Message)      4/24/25 11:51 AM  Which one does she want to do?      Bronchoscopy or CT guided biopsy of the lung nodule.      Those are the 2 options I had laid out for her     Thanks     Michelle Odonnell

## 2025-04-24 NOTE — TELEPHONE ENCOUNTER
Last office visit 3/19/25.    CT-Guided lung biopsy by IR as well as bronchoscopy recommended during office visit.

## 2025-04-24 NOTE — TELEPHONE ENCOUNTER
Per patient she believes Dr. Odonnell wanted her to have bronchoscopy in order to know what stage cancer is if cancer, so she will pursue bronchoscopy. Aware RN will follow up.

## 2025-04-29 NOTE — TELEPHONE ENCOUNTER
Spoke to patient regarding Dr. Odonnell's response dated 4/25/25. Explained trying to obtain cardiac clearance from Dr. Melissa's office, will let Dr. Odonnell know that she would like to have procedure 5/20/25, 5/21/25, 5/22/25, and we will follow up. She voiced understanding. Per patient she will not know the price until procedure scheduled.

## 2025-04-29 NOTE — TELEPHONE ENCOUNTER
Emelina at Dr. Melissa's office (p. #[phone]) states she will relay message to nurse Collazo regarding need for cardiac clearance. Contact info including fax number given. Explained procedure not scheduled yet.

## 2025-05-15 ENCOUNTER — TELEPHONE (OUTPATIENT)
Dept: PULMONOLOGY | Facility: CLINIC | Age: 65
End: 2025-05-15

## 2025-05-15 NOTE — TELEPHONE ENCOUNTER
Patient calling to speak with nurse regarding roll-n aspirin. Patient also asking about her recovery time and if she will be able to work the next day. Patient would like to confirm that prior authorization has been done for her procedure on 5/21. Please call at [phone],thanks.

## 2025-05-15 NOTE — TELEPHONE ENCOUNTER
Patient requests to know if she can use roll-on aspirin up until she has procedure, how long recovery time usually is, & if prior auth obtained for procedure and CT. Informed patient prior auth not required for navigational bronchoscopy, CT chest pending review, & RN will follow up. She voiced understanding. [facility]- awaiting auth for CT.

## 2025-05-16 RX ORDER — NEOMYCIN SULFATE, POLYMYXIN B SULFATE AND DEXAMETHASONE 3.5; 10000; 1 MG/ML; [USP'U]/ML; MG/ML
SUSPENSION/ DROPS OPHTHALMIC
COMMUNITY
Start: 2025-05-05

## 2025-05-16 RX ORDER — ASPIRIN 325 MG
325 TABLET ORAL EVERY 6 HOURS PRN
COMMUNITY

## 2025-05-16 NOTE — TELEPHONE ENCOUNTER
Patient informed of Dr. Odonnell's orders below. Per patient she would have to take aspirin because she is unable to take Tylenol (liver). Explained will follow up with [facility] [facility] regarding status of CT referral since it shows pending and only follow up if there is an issue, but procedure referral states no prior auth required. She voiced understanding. Caron states CT is already authorized.

## 2025-05-16 NOTE — TELEPHONE ENCOUNTER
Edil Odonnell DO to Me  [facility] - [facility] Team  [facility] Clinical Staff  (Selected Message)      5/16/25  6:34 AM  That's fine to use roll on ASA  Recovery time can be 1-3 days. Main symptoms after the procedure is a sore throat, coughing-sometimes coughing up blood mixed with phlegm, mild shortness of breath, low grade temp (for which she can take tylenol as needed).      Thanks     MARIA C Odonnell DO, MPH  Pulmonary Critical Care Medicine  [facility] Pulmonary and Critical Care Medicine

## 2025-05-19 DIAGNOSIS — J44.9 CHRONIC OBSTRUCTIVE PULMONARY DISEASE, UNSPECIFIED COPD TYPE (HCC): ICD-10-CM

## 2025-05-20 NOTE — TELEPHONE ENCOUNTER
Referral #[id number]  Referral Notes  .  Type Date User Summary   Prior Authorization Confirmed/Denied/NA 05/20/2025 10:06 AM Laverne Gibson Status: Prior Auth Not Required   Note:  Status: Prior Auth Not Required  Status Check Method:   Contact Name, Number:   Call Reference #:  CPT Codes that do Not Require Auth:Status: Prior Auth Not Required

## 2025-05-21 ENCOUNTER — HOSPITAL ENCOUNTER (OUTPATIENT)
Dept: CT IMAGING | Facility: HOSPITAL | Age: 65
Discharge: HOME | End: 2025-05-21
Attending: INTERNAL MEDICINE
Payer: COMMERCIAL

## 2025-05-21 ENCOUNTER — HOSPITAL ENCOUNTER (OUTPATIENT)
Facility: HOSPITAL | Age: 65
Setting detail: HOSPITAL OUTPATIENT SURGERY
Discharge: HOME | End: 2025-05-21
Attending: INTERNAL MEDICINE | Admitting: INTERNAL MEDICINE
Payer: COMMERCIAL

## 2025-05-21 ENCOUNTER — APPOINTMENT (OUTPATIENT)
Dept: GENERAL RADIOLOGY | Facility: HOSPITAL | Age: 65
End: 2025-05-21
Attending: INTERNAL MEDICINE
Payer: COMMERCIAL

## 2025-05-21 ENCOUNTER — ANESTHESIA (OUTPATIENT)
Dept: ENDOSCOPY | Facility: HOSPITAL | Age: 65
End: 2025-05-21
Payer: COMMERCIAL

## 2025-05-21 ENCOUNTER — ANESTHESIA EVENT (OUTPATIENT)
Dept: ENDOSCOPY | Facility: HOSPITAL | Age: 65
End: 2025-05-21
Payer: COMMERCIAL

## 2025-05-21 VITALS
HEIGHT: 64.5 IN | HEART RATE: 70 BPM | TEMPERATURE: 98 F | DIASTOLIC BLOOD PRESSURE: 73 MMHG | RESPIRATION RATE: 14 BRPM | OXYGEN SATURATION: 93 % | BODY MASS INDEX: 32.28 KG/M2 | WEIGHT: 191.38 LBS | SYSTOLIC BLOOD PRESSURE: 111 MMHG

## 2025-05-21 DIAGNOSIS — R91.8 LUNG NODULES: ICD-10-CM

## 2025-05-21 LAB — GLUCOSE BLDC GLUCOMTR-MCNC: 84 MG/DL (ref 70–99)

## 2025-05-21 PROCEDURE — 31654 BRONCH EBUS IVNTJ PERPH LES: CPT | Performed by: INTERNAL MEDICINE

## 2025-05-21 PROCEDURE — 71045 X-RAY EXAM CHEST 1 VIEW: CPT | Performed by: INTERNAL MEDICINE

## 2025-05-21 PROCEDURE — 31628 BRONCHOSCOPY/LUNG BX EACH: CPT | Performed by: INTERNAL MEDICINE

## 2025-05-21 PROCEDURE — 76497 UNLISTED CT PROCEDURE: CPT | Performed by: INTERNAL MEDICINE

## 2025-05-21 PROCEDURE — 31629 BRONCHOSCOPY/NEEDLE BX EACH: CPT | Performed by: INTERNAL MEDICINE

## 2025-05-21 PROCEDURE — 31627 NAVIGATIONAL BRONCHOSCOPY: CPT | Performed by: INTERNAL MEDICINE

## 2025-05-21 PROCEDURE — 76380 CAT SCAN FOLLOW-UP STUDY: CPT | Performed by: INTERNAL MEDICINE

## 2025-05-21 RX ORDER — ROCURONIUM BROMIDE 10 MG/ML
INJECTION, SOLUTION INTRAVENOUS AS NEEDED
Status: DISCONTINUED | OUTPATIENT
Start: 2025-05-21 | End: 2025-05-21 | Stop reason: SURG

## 2025-05-21 RX ORDER — NEOSTIGMINE METHYLSULFATE 1 MG/ML
INJECTION INTRAVENOUS AS NEEDED
Status: DISCONTINUED | OUTPATIENT
Start: 2025-05-21 | End: 2025-05-21 | Stop reason: SURG

## 2025-05-21 RX ORDER — LIDOCAINE HYDROCHLORIDE 10 MG/ML
INJECTION, SOLUTION EPIDURAL; INFILTRATION; INTRACAUDAL; PERINEURAL AS NEEDED
Status: DISCONTINUED | OUTPATIENT
Start: 2025-05-21 | End: 2025-05-21 | Stop reason: SURG

## 2025-05-21 RX ORDER — IPRATROPIUM BROMIDE AND ALBUTEROL SULFATE 2.5; .5 MG/3ML; MG/3ML
3 SOLUTION RESPIRATORY (INHALATION) ONCE
Status: COMPLETED | OUTPATIENT
Start: 2025-05-21 | End: 2025-05-21

## 2025-05-21 RX ORDER — GLYCOPYRROLATE 0.2 MG/ML
INJECTION, SOLUTION INTRAMUSCULAR; INTRAVENOUS AS NEEDED
Status: DISCONTINUED | OUTPATIENT
Start: 2025-05-21 | End: 2025-05-21 | Stop reason: SURG

## 2025-05-21 RX ORDER — NALOXONE HYDROCHLORIDE 0.4 MG/ML
0.08 INJECTION, SOLUTION INTRAMUSCULAR; INTRAVENOUS; SUBCUTANEOUS ONCE AS NEEDED
Status: DISCONTINUED | OUTPATIENT
Start: 2025-05-21 | End: 2025-05-21 | Stop reason: HOSPADM

## 2025-05-21 RX ORDER — SODIUM CHLORIDE, SODIUM LACTATE, POTASSIUM CHLORIDE, CALCIUM CHLORIDE 600; 310; 30; 20 MG/100ML; MG/100ML; MG/100ML; MG/100ML
INJECTION, SOLUTION INTRAVENOUS CONTINUOUS
Status: DISCONTINUED | OUTPATIENT
Start: 2025-05-21 | End: 2025-05-21

## 2025-05-21 RX ORDER — MIDAZOLAM HYDROCHLORIDE 1 MG/ML
INJECTION INTRAMUSCULAR; INTRAVENOUS AS NEEDED
Status: DISCONTINUED | OUTPATIENT
Start: 2025-05-21 | End: 2025-05-21 | Stop reason: SURG

## 2025-05-21 RX ORDER — IPRATROPIUM BROMIDE AND ALBUTEROL SULFATE 2.5; .5 MG/3ML; MG/3ML
SOLUTION RESPIRATORY (INHALATION)
Qty: 180 ML | Refills: 3 | Status: SHIPPED | OUTPATIENT
Start: 2025-05-21

## 2025-05-21 RX ORDER — ONDANSETRON 2 MG/ML
INJECTION INTRAMUSCULAR; INTRAVENOUS AS NEEDED
Status: DISCONTINUED | OUTPATIENT
Start: 2025-05-21 | End: 2025-05-21 | Stop reason: SURG

## 2025-05-21 RX ORDER — BUDESONIDE AND FORMOTEROL FUMARATE DIHYDRATE 160; 4.5 UG/1; UG/1
2 AEROSOL RESPIRATORY (INHALATION) 2 TIMES DAILY
Qty: 30.6 G | Refills: 3 | Status: SHIPPED | OUTPATIENT
Start: 2025-05-21

## 2025-05-21 RX ADMIN — ONDANSETRON 4 MG: 2 INJECTION INTRAMUSCULAR; INTRAVENOUS at 15:09:00

## 2025-05-21 RX ADMIN — LIDOCAINE HYDROCHLORIDE 50 MG: 10 INJECTION, SOLUTION EPIDURAL; INFILTRATION; INTRACAUDAL; PERINEURAL at 13:35:00

## 2025-05-21 RX ADMIN — ROCURONIUM BROMIDE 40 MG: 10 INJECTION, SOLUTION INTRAVENOUS at 13:35:00

## 2025-05-21 RX ADMIN — GLYCOPYRROLATE 0.4 MG: 0.2 INJECTION, SOLUTION INTRAMUSCULAR; INTRAVENOUS at 15:04:00

## 2025-05-21 RX ADMIN — SODIUM CHLORIDE, SODIUM LACTATE, POTASSIUM CHLORIDE, CALCIUM CHLORIDE: 600; 310; 30; 20 INJECTION, SOLUTION INTRAVENOUS at 13:27:00

## 2025-05-21 RX ADMIN — MIDAZOLAM HYDROCHLORIDE 2 MG: 1 INJECTION INTRAMUSCULAR; INTRAVENOUS at 13:30:00

## 2025-05-21 RX ADMIN — NEOSTIGMINE METHYLSULFATE 3 MG: 1 INJECTION INTRAVENOUS at 15:04:00

## 2025-05-21 NOTE — DISCHARGE INSTRUCTIONS
Home Care Instructions Following Bronchoscopy / Endobronchial Ultrasound with Sedation    Diet:  Prior to your examination, a local anesthetic was used to numb the back of your throat. Do not eat or drink for two hours. Your nurse will instruct you with the time you may resume your diet.  Sip fluids initially and advance to your regular diet as tolerated.  Do not drink alcohol today.    Medication:  If you have questions about resuming your normal medications, please contact your Primary Care Physician.  Resume Aspirin 325 mg tomorrow, 5/22/25.    Activities:  Do not drive today.  Do not operate any machinery today (including kitchen equipment).    What to Expect:  A sore throat  A cough  Hoarseness  A small amount of blood in your sputum    Contact Your Doctor If:  You have difficulty breathing  You have chest pain  You have a fever greater than 102°F  You cough up more than a few tablespoons of blood in your sputum    **If unable to reach your doctor, please go to the [facility] Emergency Room**    - Your referring physician will receive a full report of your examination.  - Please contact your physician’s office within one week for results if appointment not scheduled.    You may be able to see your laboratory results in [employer] between 4 and 7 business days.  In some cases, your physician may not have viewed the results before they are released to [employer].  If you have questions regarding your results contact the physician who ordered the test/exam by phone or via markedupt by choosing \"Ask a Medical Question.\"

## 2025-05-21 NOTE — ANESTHESIA PREPROCEDURE EVALUATION
Anesthesia PreOp Note    HPI:     Caron Daniels is a 65 year old female who presents for preoperative consultation requested by: Edil Odonnell DO    Date of Surgery: 5/21/2025    Procedure(s):  ROBOT-ASSISTED NAVIGATIONAL BRONCHOSCOPY  ENDOBRONCHIAL ULTRASOUND (EBUS)  Indication: Lung nodules    Relevant Problems   No relevant active problems       NPO:  Last Liquid Consumption Date: 05/20/25  Last Liquid Consumption Time: 2359  Last Solid Consumption Date: 05/20/25  Last Solid Consumption Time: 2000  Last Liquid Consumption Date: 05/20/25          History Review:  Patient Active Problem List    Diagnosis Date Noted    Class 1 obesity due to excess calories with serious comorbidity and body mass index (BMI) of 34.0 to 34.9 in adult 02/20/2025    Type 2 diabetes mellitus without complication, without long-term current use of insulin (HCC) 06/19/2024    Environmental allergies 06/19/2024    Hyperlipidemia 06/19/2024    Health maintenance examination 06/19/2024    CHF (congestive heart failure) ([facility])     Cardiomyopathy ([facility])     Lung mass 04/08/2024    History of tobacco use 04/08/2024    ICD (implantable cardioverter-defibrillator) in place 04/08/2024    Alcohol use disorder in remission 04/08/2024    COPD (chronic obstructive pulmonary disease) ([facility]) 03/13/2017       Past Medical History[1]    Past Surgical History[2]    Prescriptions Prior to Admission[3]  Current Medications and Prescriptions Ordered in Epic[4]    Allergies[5]    Family History[6]  Social Hx on file[7]    Available pre-op labs reviewed.             Vital Signs:  Body mass index is 32.35 kg/m².   height is 1.638 m (5' 4.5\") and weight is 86.8 kg (191 lb 6.4 oz). Her blood pressure is 157/84 and her pulse is 89. Her respiration is 15 and oxygen saturation is 91%.   Vitals:    05/16/25 1427 05/21/25 1156 05/21/25 1210   BP:   157/84   Pulse:   89   Resp:   15   SpO2:   91%   Weight: 87.1 kg (192 lb) 86.8 kg (191 lb 6.4 oz)    Height: 1.626 m  (5' 4\") 1.638 m (5' 4.5\")         Anesthesia Evaluation     Patient summary reviewed and Nursing notes reviewed    History of anesthetic complications (wakes up from anesthesia \"mean\")   Airway   Mallampati: II  TM distance: >3 FB  Neck ROM: full  Dental - Dentition appears grossly intact     Pulmonary - negative ROS and normal exam   (+) COPD, sleep apnea  Cardiovascular - negative ROS and normal exam  Exercise tolerance: good  (+) CHF (aicd placed 2008 but battery out/ef improved since then 55%)    NYHA Classification: II    Neuro/Psych - negative ROS     GI/Hepatic/Renal - negative ROS     Comments: etoh    Endo/Other - negative ROS   (+) diabetes mellitus  Abdominal  - normal exam                 Anesthesia Plan:   ASA:  3  Plan:   MAC  Informed Consent Plan and Risks Discussed With:  Patient      I have informed Caron Daniels and/or legal guardian or family member of the nature of the anesthetic plan, benefits, risks including possible dental damage if relevant, major complications, and any alternative forms of anesthetic management.   All of the patient's questions were answered to the best of my ability. The patient desires the anesthetic management as planned.  Oscar Sims MD  5/21/2025 1:05 PM  Present on Admission:  **None**           [1]   Past Medical History:   Alcohol use disorder in remission    Anesthesia complication    woke up during procedure  \" it takes alot to knock me out\"    Arrhythmia    Cardiomyopathy (HCC)    CHF (congestive heart failure) (HCC)    Congestive heart disease (HCC)    last echo 3/2024 EF 50-55%    COPD (chronic obstructive pulmonary disease) (HCC)    Pink eye    Sleep apnea    recent diagnosis- no current treatment    Type 2 diabetes mellitus without complication, without long-term current use of insulin (HCC)    Visual impairment    glasses   [2]   Past Surgical History:  Procedure Laterality Date    Cardiac defibrillator placement      Conization  cervix,knife/laser  1987    Hc insert dual electrode pmkr or icd  2009   [3]   Medications Prior to Admission   Medication Sig Dispense Refill Last Dose/Taking    Budesonide-Formoterol Fumarate (SYMBICORT) 160-4.5 MCG/ACT Inhalation Aerosol Inhale 2 puffs into the lungs 2 (two) times daily. 30.6 g 3 5/21/2025    ipratropium-albuterol 0.5-2.5 (3) MG/3ML Inhalation Solution Use 1 vial via nebulizer every 6 hours as needed 180 mL 3 5/21/2025    Neomycin-Polymyxin-Dexameth 3.5-29535-7.1 Ophthalmic Suspension    Taking    aspirin 325 MG Oral Tab Take 1 tablet (325 mg total) by mouth every 6 (six) hours as needed for Pain.   5/15/2025    loteprednol 0.5 % Ophthalmic Suspension 1 drop 4 (four) times daily.   Taking    tiotropium 18 MCG Inhalation Cap Inhale 1 capsule (18 mcg total) into the lungs daily. 30 capsule 5 5/20/2025    carvedilol 6.25 MG Oral Tab Take 1 tablet (6.25 mg total) by mouth in the morning and 1 tablet (6.25 mg total) in the evening. Take with meals.   5/20/2025 Evening    albuterol (PROVENTIL HFA) 108 (90 Base) MCG/ACT Inhalation Aero Soln Inhale 1 puff into the lungs every 4 (four) hours as needed for Wheezing. 1 each 5 Taking As Needed    montelukast 10 MG Oral Tab Take 1 tablet (10 mg total) by mouth nightly. 90 tablet 3 5/20/2025    atorvastatin 40 MG Oral Tab Take 1 tablet (40 mg total) by mouth nightly. 90 tablet 3 5/20/2025    enalapril 2.5 MG Oral Tab Take 1 tablet (2.5 mg total) by mouth 2 (two) times daily. 180 tablet 3 5/20/2025 Morning    aspirin 81 MG Oral Chew Tab Chew 1 tablet (81 mg total) by mouth daily. 30 tablet 0 5/20/2025    furosemide 40 MG Oral Tab Take 1 tablet (40 mg total) by mouth in the morning.   5/16/2025    loratadine 10 MG Oral Tab Take 1 tablet (10 mg total) by mouth in the morning.   5/20/2025    omeprazole 20 MG Oral Capsule Delayed Release Take 1 capsule (20 mg total) by mouth every morning.   5/20/2025    predniSONE 10 MG Oral Tab 4 tabs/day x 2 days, 3 tabs/day x 2  days, 2 tabs/day x 2 days, 1 tab/day x 2 days. (Patient not taking: Reported on 2025) 20 tablet 0 Not Taking    Potassium Chloride ER 10 MEQ Oral Tab CR Take 2 tablets (20 mEq total) by mouth in the morning.      [4]   Current Facility-Administered Medications Ordered in Epic   Medication Dose Route Frequency Provider Last Rate Last Admin    lactated ringers infusion   Intravenous Continuous Edil Odonnell DO         No current [facility]-ordered outpatient medications on file.   [5] No Known Allergies  [6]   Family History  Problem Relation Age of Onset    Obesity Mother     Heart Attack Mother     Hypertension Father     Other (Parkinsons) Father     Arrhythmia Brother     No Known Problems Brother     Dementia Maternal Grandmother     Heart Attack Maternal Grandfather     Hypertension Paternal Grandmother     Heart Attack Paternal Grandmother     No Known Problems Paternal Grandfather     Breast Cancer Neg     Colon Cancer Neg    [7]   Social History  Socioeconomic History    Marital status:    Tobacco Use    Smoking status: Former     Current packs/day: 0.00     Average packs/day: 3.5 packs/day for 55.0 years (192.5 ttl pk-yrs)     Types: Cigarettes, Cigars     Start date: 1969     Quit date: 2024     Years since quittin.2    Smokeless tobacco: Never   Vaping Use    Vaping status: Never Used   Substance and Sexual Activity    Alcohol use: No     Comment: formerly drank heavily; sober since 15    Drug use: Not Currently     Types: Cannabis    Sexual activity: Not Currently     Partners: Male

## 2025-05-21 NOTE — TELEPHONE ENCOUNTER
Refill passed per Clinic protocol.  Requested Prescriptions   Pending Prescriptions Disp Refills    Budesonide-Formoterol Fumarate (SYMBICORT) 160-4.5 MCG/ACT Inhalation Aerosol 10.2 g 3     Sig: Inhale 2 puffs into the lungs 2 (two) times daily.       Asthma & COPD Medication Protocol Passed - 5/21/2025 10:27 AM        Passed - Appointment in past 6 or next 3 months      Recent Outpatient Visits              2 months ago Chronic obstructive pulmonary disease, unspecified COPD type ([facility]    [facility]    3 months ago Health maintenance examination    [facility]    Office Visit    3 months ago Lung mass    [facility] Edil Odonnell DO    Office Visit    4 months ago Type 2 diabetes mellitus without complication, without long-term current use of insulin ([facility])    [facility]    Office Visit    7 months ago Blepharitis of upper and lower eyelids of both eyes, unspecified type    [facility]    Office Visit          Future Appointments         Provider Department Appt Notes    Today [facility] CT RM1 CARD [facility] CT qa ins-ap    In 4 weeks Edil Odonnell DO [facility] 3 months    In 3 months Deondre Calderon MD [facility]                     Passed - Medication is active on med list          ipratropium-albuterol 0.5-2.5 (3) MG/3ML Inhalation Solution 180 mL 0     Sig: Use 1 vial via nebulizer every 6 hours as needed       Asthma & COPD Medication Protocol Passed - 5/21/2025 10:27 AM        Passed - Appointment in past 6 or next 3 months      Recent Outpatient Visits              2 months ago Chronic  obstructive pulmonary disease, unspecified COPD type ([facility])    [facility] Edil Odonnell DO    Office Visit    3 months ago [facility]    [facility]    3 months ago Lung mass    [facility] Edil Odonnell DO    Office Visit    4 months ago Type 2 diabetes mellitus without complication, without long-term current use of insulin ([facility])    [facility] Deondre Snyder MD    Office Visit    7 months ago Blepharitis of upper and lower eyelids of both eyes, unspecified type    [facility] Kolby Saldana APRN    Office Visit          Future Appointments         Provider Department Appt Notes    Today [facility] CT RM1 CARD [facility] CT qa ins-ap    In 4 weeks Edil Odonnell DO [facility] 3 months    In 3 months Deondre Calderon MD [facility]                     Passed - Medication is active on med list

## 2025-05-21 NOTE — ANESTHESIA POSTPROCEDURE EVALUATION
Patient: Caron Daniels    Procedure Summary       Date: 05/21/25 Room / Location: [facility] ENDOSCOPY 05 / [facility] ENDOSCOPY    Anesthesia Start: 1327 Anesthesia Stop:     Procedures:       ROBOT-ASSISTED NAVIGATIONAL BRONCHOSCOPY (Right)      ENDOBRONCHIAL ULTRASOUND (EBUS) (Bilateral) Diagnosis:       Lung nodules      (Lung nodules)    Surgeons: Edil Odonnell DO Anesthesiologist: Oscar Sims MD    Anesthesia Type: MAC ASA Status: 3            Anesthesia Type: No value filed.    Vitals Value Taken Time   /126 05/21/25 15:17   Temp 97.8 05/21/25 15:19   Pulse 87 05/21/25 15:18   Resp 23 05/21/25 15:18   SpO2 98 % 05/21/25 15:18   Vitals shown include unfiled device data.    [facility] AN Post Evaluation:   Patient Participation: complete - patient participated  Level of Consciousness: awake  Pain Score: 0  Pain Management: adequate  Airway Patency:patent  Yes    Nausea/Vomiting: none  Cardiovascular Status: acceptable  Respiratory Status: acceptable  Postoperative Hydration acceptable      Oscar Sims MD  5/21/2025 3:19 PM

## 2025-05-21 NOTE — BRIEF PROCEDURE NOTE
ION/EBUS Procedure    Pre-procedure diagnosis/indication:  Right lung mass right lung nodules, and mediastinal lymphadenopathy     Post procedure diagnosis:  Right lung mass, right lung nodules, and mediastinal lymphadenopathy     Pre procedure history and physical was performed. Patient meds and allergies were reviewed. Risks and benefits of the procedure and the sedation options and risks were discussed with the patient. Informed consent was obtained and all questions were answered.  Time-out was completed verifying correct patient, procedure, site, positioning, and implant(s) or special equipment if applicable. The patient's Hgb, platelet count, and INR (if applicable) were reviewed. After reviewing risks and benefits, the patient was deemed in satisfactory condition to undergo the procedure. Anesthesia plan was general anesthesia. Vitals (heart rate, respiratory rate, oxygen saturations, and blood pressure) were continuously monitored throughout the procedure. Supplemental oxygen was provided to the patient throughout the procedure.     Findings:  The bronchoscope was introduced into the 8.0 ETT  The trachea was of normal caliber, and the shelly was sharp. The tracheobronchial tree was examined to at least the first subsegmental level.  The bronchial mucosa and anatomy were normal appearing. There were minimal thin secretions in the distal airways which were suctioned, no mucus plugs, endobronchial lesions, or bleeding were noted. The video bronchoscope was then removed and the ION robotic bronchoscope was inserted with preloaded navigation pathway system using the most recent chest CT from earlier in the day. The right lung mass was navigated to and location was confirmed multiple times using radial EBUS and flouroscopic guidance and 3D -C-arm.  Next using a 21 g FNA needle, multiple passes were performed from the right lower lobe lung lesion and sent for cytological evaluation. Next, using standard biopsy  forceps several transbronchial lung biopsies were performed of the right lower lobe mass lesion and sent for pathology evaluation.  The ION bronchoscope system was removed and the EBUS scope was then inserted through the ETT.   EBUS lymph node survey revealed the followin. Left interlobar (11L) LN measures 0.5 cm in short axis diameter.  2. Left hilar (10L) LN measures 0.5 cm in short axis diameter.  3. Left low paratracheal (4L) LN measures 0.4 cm in short axis diameter.  4. Subcarinal (7) LN measures 2.0 cm in short axis diameter.  5. Right low paratracheal (4R) LN measures 0.7 cm in short axis diameter.  6. Right hilar (10R) LN measures 0.8 cm in short axis diameter.  7. Right interlobar (11R) LN measures 0.7 cm in short axis diameter.     Next using a 21 g EBUS FNA needle was used to obtain multiple lymph node biopsies from station 7 (subcarinal) and sent for cytological evaluation.  There was minimal airway bleeding which was self contained. No active bleeding was noted, and the bronchoscope was then withdrawn and the patient tolerated the procedure well.    Specimens:  EBUS TBNA of the 7 (subcarinal) lymph node station.  Transbronchial needle aspirations and biopsies were performed of the right lower lobe mass lesion     Recommendations:  Follow up on test results  Follow up post procedure CXR     MARIA C Odonnell DO, MPH  Pulmonary Critical Care Medicine  formerly [facility] Pulmonary and Critical Care Medicine

## 2025-05-21 NOTE — ANESTHESIA PROCEDURE NOTES
Airway  Date/Time: 5/21/2025 1:37 PM  Reason: Elective    Airway not difficult    General Information and Staff   Patient location during procedure: OR  Anesthesiologist: Oscar Sims MD  Performed: anesthesiologist   Performed by: Oscar Sims MD  Authorized by: Oscar Sims MD        Indications and Patient Condition  Indications for airway management: anesthesia  Sedation level: deep      Preoxygenated: yesPatient position: sniffing    Mask difficulty assessment: 1 - vent by mask    Final Airway Details    Final airway type: endotracheal airway    Successful airway: ETT     Successful intubation technique: direct laryngoscopy  Blade: Alex  Blade size: #3  ETT size (mm): 8.0    Placement verified by: bronchoscopy and capnometry   Measured from: lips  ETT to lips (cm): 22  Number of attempts at approach: 1

## 2025-05-21 NOTE — OR PREOP
[phone] [employer] called to verify AICD compatible with magnet use for procedure. For ICDs, once the magnet is applied, an audible tone is heard for duration of use. The magnet inhibits the shock component of the ICD, but does not affect the rate. Once magnet is removed, the device returns to normal setting. Does not need to be interrogated, unless concern for malfunction. [facility]

## 2025-05-21 NOTE — H&P
Pulmonary Medicine Outpatient H+P Note           Reason for Consultation and CC: lung mass, COPD     Referring Physician: Dr. Calderon       HPI   Pt with hx of smoking and found to have lung nodules and a right lower lung mass very concerning for lung cancer  We reviewed imaging in clinic and after a while the pt was willing to move forward with bronchoscopy with biopsies of the right lung mass and possible lymph node biopsies.       Seen for f/u on 3/19/25.   Completed a chest CT showed enlarging lung nodules. Advised to get a PET scan and plan to schedule a biopsy.   Also ordered PFTs not done yet.   She hasn't had these tests done and reports financial problems and cost as the main reason. Wants to wait until she turns 66 and has medicare. I strongly advised her against this as her repeat CT already shows the nodules are increasing and very concerning for malignancy. I relayed this to her. She is willing to have either the PET scan or the biopsy. I told her the biopsy is more important.  I began discussing performing a bronchoscopy with general anesthesia at which point she told me she's had very bad experiences with anesthesia in the past and is very reluctant to undergo general anesthesia. I told her the alternative is to see if interventional radiology can perform a CT guided biopsy. She seems to be willing to undergo this type of biopsy.   She remains on Symbicort (160/4.5) 2 puffs BID (for the past month), Albuterol she uses 4-8 hrs for shortness of breath, chest tightness, wheezing and hx of COPD.  Symptoms consist of coughing, shortness of breath  No wheezing, chest congestion  Has seen Dr. Cole from Oncology in the past.      From the initial consultation  HPI: I had the pleasure of seeing Caron Ann Jeremiah for a Pulmonary Medicine consultation on 2/19/25.  65 yo woman with hx of ETOH in remission, cardiomyopathy, CHF, COPD, DM2, lung mass previously saw [facility] (Dr. Jani Alfonso) while hospitalized  in 3/2024. Last imaging was in 3/2024 which confirmed the lung mass. Pt was w/o insurance and had no further imaging since. Took social security early to pay for insurance starting 1/2025. Now here for further pulmonary evaluation.  On Symbicort (160/4.5) 2 puffs BID (for the past month), Albuterol she uses 3-5 hrs for shortness of breath, chest tightness, wheezing and hx of COPD.  Uses Duoneb in the morning.   Not on supplemental oxygen.  Estimates last PFTs were in 2010.    Follows with cardiology @ [place]        REVIEW OF SYSTEMS:  Positives and negatives as stated in HPI. Remainder of 12 pt review of systems otherwise are negative.       PAST MEDICAL HISTORY:  Past Medical History:    Alcohol use disorder in remission    Anesthesia complication    woke up during procedure  \" it takes alot to knock me out\"    Arrhythmia    Cardiomyopathy (HCC)    CHF (congestive heart failure) (HCC)    Congestive heart disease (HCC)    last echo 3/2024 EF 50-55%    COPD (chronic obstructive pulmonary disease) (HCC)    Pink eye    Sleep apnea    recent diagnosis- no current treatment    Type 2 diabetes mellitus without complication, without long-term current use of insulin (HCC)    Visual impairment    glasses          PAST SURGICAL HISTORY:  Past Surgical History:   Procedure Laterality Date    Cardiac defibrillator placement      Conization cervix,knife/laser  1987     insert dual electrode pmkr or icd  2009          PAST FAMILY HISTORY:  Family History   Problem Relation Age of Onset    Obesity Mother     Heart Attack Mother     Hypertension Father     Other (Parkinsons) Father     Arrhythmia Brother     No Known Problems Brother     Dementia Maternal Grandmother     Heart Attack Maternal Grandfather     Hypertension Paternal Grandmother     Heart Attack Paternal Grandmother     No Known Problems Paternal Grandfather     Breast Cancer Neg     Colon Cancer Neg           PAST SOCIAL HISTORY:  Social History     Socioeconomic  History    Marital status:    Tobacco Use    Smoking status: Former     Current packs/day: 0.00     Average packs/day: 3.5 packs/day for 55.0 years (192.5 ttl pk-yrs)     Types: Cigarettes, Cigars     Start date: 1969     Quit date: 2024     Years since quittin.2    Smokeless tobacco: Never   Vaping Use    Vaping status: Never Used   Substance and Sexual Activity    Alcohol use: No     Comment: formerly drank heavily; sober since 15    Drug use: Not Currently     Types: Cannabis    Sexual activity: Not Currently     Partners: Male   Social History Narrative    Relationships:  - Gene    Children: Jaimie (adult M)    Pets: Dog and Cat    School: N/A    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    Interests: Watches TV, playing game on phone.     Spiritual: Latter-day - goes to [employer] in [place].      Social Drivers of Health     Food Insecurity: No Food Insecurity (3/17/2024)    Food Insecurity     Food Insecurity: Never true   Transportation Needs: No Transportation Needs (3/17/2024)    Transportation Needs     Lack of Transportation: No   Housing Stability: Low Risk  (3/17/2024)    Housing Stability     Housing Instability: No   Quit smoking in 3/2024. Smoked for 55 yrs (started at age 9) @ 1 PPD  Lives with   Has a dog and cat  Employed: works at [employer] agency      ALLERGIES:  Allergies[1]  Augmentin-makes stomach hurt       MEDS:  Medications Ordered Prior to Encounter[2]       PHYSICAL EXAM:  /84 (BP Location: Left arm)   Pulse 89   Resp 15   Ht 5' 4.5\" (1.638 m)   Wt 191 lb 6.4 oz (86.8 kg)   SpO2 91%   BMI 32.35 kg/m²   CONSTITUTIONAL: alert, oriented, no apparent distress  HEENT: atraumatic normocephalic  MOUTH: mucous membranes are moist. No OP exudates  NECK/THROAT: no JVD. Trachea midline. No obvious thyromegaly  LUNG: diminished BS but clear b/l no wheezing, crackles. Chest symmetric with respiratory  motion  HEART: regular rate and rhythm, no obvious murmers or gallops note  ABD: soft non tender. + bowel sounds. No organomegaly noted  EXT: no clubbing, cyanosis, or edema noted. Pulses intact grossly  NEURO/MUSCULOSKELETAL: no gross deficits  SKIN: warm, dry. No obvious lesions noted  LYMPH: no obvious LAD       IMAGES:  Chest CT 3/14/25  CONCLUSION:   1. Spiculated right lower lobe mass measuring 4.2 x 2.3 cm, as well as additional pulmonary nodules in the right upper and lower lobes have slightly increased in size compared to 03/17/2024 exam.  There is also a stable mildly enlarged subcarinal lymph node.  Differential considerations include a malignant process such as a primary right lower lobe lung cancer with ipsilateral lung and mediastinal waqar metastases, as well as an atypical inflammatory/granulomatous process or atypical infectious process (such as fungal pneumonia).  Further assessment with a PET/CT and/or percutaneous biopsy is recommended.   2. Moderate emphysema, mild bronchial wall thickening and mucous plugging at the lung bases are grossly unchanged, the latter of which may relate to chronic inflammatory bronchiolitis/small airways infection or chronic aspiration.  Right lower lobe   ground-glass opacity seen on the previous CT that likely related to inflammatory bronchiolitis has resolved.   3. Stable cardiomegaly in this patient with a cardiac AICD.   4. Lesser incidental findings as above.     Chest CT 3/2024  No evidence of acute pulmonary embolism to the level of the segmental pulmonary artery branches   Spiculated 3.8 cm right lower lobe pulmonary mass.  This is concerning for a primary lung neoplasia, with infection being considered less likely. Recommend further assessment with tissue sampling.   Multiple additional pulmonary nodules, which of which are spiculated measuring up to 1.5 cm, concerning for sites sites of pulmonary metastatic disease.   A 2.1 cm ground-glass and reticular  opacity of the right lower lobe may be infectious or inflammatory in etiology, with primary lung neoplasia being within the differential.  Further assessment with tissue sampling and/or FDG PET-CT would be helpful.   Bilateral hilar and subcarinal lymph nodes measuring up to 1.1 cm.  These are concerning for waqar metastatic disease. Reactive etiologies a differential consideration.        LABS:  Lab Results   Component Value Date    WBC 9.1 01/16/2025    RBC 4.46 01/16/2025    HGB 13.4 01/16/2025    HCT 40.3 01/16/2025    MCV 90.4 01/16/2025    MCH 30.0 01/16/2025    MCHC 33.3 01/16/2025    MPV 10.3 03/14/2017     No results for input(s): \"GLU\", \"BUN\", \"CREATSERUM\", \"GFRAA\", \"GFRNAA\", \"EGFRCR\", \"CA\", \"ALB\", \"NA\", \"K\", \"CL\", \"CO2\", \"ALKPHO\", \"AST\", \"ALT\", \"BILT\", \"TP\" in the last 168 hours.       PFTS none    PSG/CPAP titration results:  none       ASSESSMENT/PLAN:  Lung mass with lung nodules and possible mediastinal lymphadenopathy  Pertinent labs and imaging reviewed. Risks and benefits of bronchoscopy with ION robotic navigational bronchoscopy, transbronchial lung biopsies of the right lung mass, transbronchial needle aspiration of the right lung mass, endobronchial ultrasound guided needle biopsies (EBUS) were reviewed with the patient. All questions answered. Consent obtained.        MARIA C Odonnell DO, MPH  Pulmonary and Critical Care Medicine               [1] No Known Allergies  [2]   No current facility-administered medications on file prior to encounter.     Current Outpatient Medications on File Prior to Encounter   Medication Sig Dispense Refill    Neomycin-Polymyxin-Dexameth 3.5-92158-9.1 Ophthalmic Suspension       aspirin 325 MG Oral Tab Take 1 tablet (325 mg total) by mouth every 6 (six) hours as needed for Pain.      loteprednol 0.5 % Ophthalmic Suspension 1 drop 4 (four) times daily.      tiotropium 18 MCG Inhalation Cap Inhale 1 capsule (18 mcg total) into the lungs daily. 30 capsule 5     carvedilol 6.25 MG Oral Tab Take 1 tablet (6.25 mg total) by mouth in the morning and 1 tablet (6.25 mg total) in the evening. Take with meals.      albuterol (PROVENTIL HFA) 108 (90 Base) MCG/ACT Inhalation Aero Soln Inhale 1 puff into the lungs every 4 (four) hours as needed for Wheezing. 1 each 5    montelukast 10 MG Oral Tab Take 1 tablet (10 mg total) by mouth nightly. 90 tablet 3    atorvastatin 40 MG Oral Tab Take 1 tablet (40 mg total) by mouth nightly. 90 tablet 3    enalapril 2.5 MG Oral Tab Take 1 tablet (2.5 mg total) by mouth 2 (two) times daily. 180 tablet 3    aspirin 81 MG Oral Chew Tab Chew 1 tablet (81 mg total) by mouth daily. 30 tablet 0    furosemide 40 MG Oral Tab Take 1 tablet (40 mg total) by mouth in the morning.      loratadine 10 MG Oral Tab Take 1 tablet (10 mg total) by mouth in the morning.      omeprazole 20 MG Oral Capsule Delayed Release Take 1 capsule (20 mg total) by mouth every morning.      predniSONE 10 MG Oral Tab 4 tabs/day x 2 days, 3 tabs/day x 2 days, 2 tabs/day x 2 days, 1 tab/day x 2 days. (Patient not taking: Reported on 5/16/2025) 20 tablet 0    Potassium Chloride ER 10 MEQ Oral Tab CR Take 2 tablets (20 mEq total) by mouth in the morning.

## 2025-05-22 PROBLEM — R59.0 MEDIASTINAL LYMPHADENOPATHY: Status: ACTIVE | Noted: 2025-05-22

## 2025-05-23 ENCOUNTER — PATIENT MESSAGE (OUTPATIENT)
Dept: INTERNAL MEDICINE CLINIC | Facility: CLINIC | Age: 65
End: 2025-05-23

## 2025-05-23 DIAGNOSIS — C34.90 ADENOCARCINOMA OF LUNG, UNSPECIFIED LATERALITY (HCC): Primary | ICD-10-CM

## 2025-05-23 DIAGNOSIS — H57.89 EYE IRRITATION: ICD-10-CM

## 2025-05-27 PROBLEM — C34.90 ADENOCARCINOMA OF LUNG (HCC): Status: ACTIVE | Noted: 2025-05-27

## 2025-05-28 ENCOUNTER — TELEPHONE (OUTPATIENT)
Age: 65
End: 2025-05-28

## 2025-05-28 NOTE — TELEPHONE ENCOUNTER
Called patient with PET scheduled for 6/2/25 at 0945 am at [facility], green parking lot to [facility], no carbs day before, NPO after midnight.  She verbalizes understanding.  Spoke to authorization dept to get authorized.  In process.

## 2025-05-28 NOTE — TELEPHONE ENCOUNTER
Left a message on Natty's voice mail to call the office. Avingerhart message sent to Caron to call our office.     Inquire if Caron still has [place] insurance? Patient states Dr. Gonzales is not in network.

## 2025-05-28 NOTE — TELEPHONE ENCOUNTER
Spoke with Caron who states she has bilateral eye irriation and at times swelling despite treatment. Patient is requesting soon Ophthalmology appointment. Caron states [facility] office states they do not take [place].     Caron requesting a soon Ophthalmology appointment. RN referring Caron to [facility] and advised to call their office at [phone]. Patient agreed. RN advises if this office needs a referral our office will enter and fax over to their office. Patient voiced understanding.

## 2025-05-29 NOTE — TELEPHONE ENCOUNTER
Spoke with Caron who states she called [facility] and scheduled an appointment tomorrow at 9:15 AM but the office needs a referral. RN called [facility] at [phone] and spoke to Dee. Dee states they do not need a referral since the patient's insurance is [employer], but they would like physician notes. RN verified their fax number of [phone].

## 2025-05-29 NOTE — TELEPHONE ENCOUNTER
Appointment notes from [facility] faxed to [facility] at [phone].

## 2025-05-30 ENCOUNTER — APPOINTMENT (OUTPATIENT)
Age: 65
End: 2025-05-30
Attending: INTERNAL MEDICINE
Payer: COMMERCIAL

## 2025-06-02 ENCOUNTER — HOSPITAL ENCOUNTER (OUTPATIENT)
Dept: NUCLEAR MEDICINE | Facility: HOSPITAL | Age: 65
Discharge: HOME | End: 2025-06-02
Attending: INTERNAL MEDICINE
Payer: COMMERCIAL

## 2025-06-02 DIAGNOSIS — C34.31 MALIGNANT NEOPLASM OF LOWER LOBE OF RIGHT LUNG (HCC): ICD-10-CM

## 2025-06-02 LAB — GLUCOSE BLDC GLUCOMTR-MCNC: 91 MG/DL (ref 70–99)

## 2025-06-02 PROCEDURE — 82962 GLUCOSE BLOOD TEST: CPT

## 2025-06-02 PROCEDURE — 78815 PET IMAGE W/CT SKULL-THIGH: CPT | Performed by: INTERNAL MEDICINE

## 2025-06-06 ENCOUNTER — OFFICE VISIT (OUTPATIENT)
Age: 65
End: 2025-06-06
Attending: INTERNAL MEDICINE
Payer: COMMERCIAL

## 2025-06-06 VITALS
SYSTOLIC BLOOD PRESSURE: 159 MMHG | DIASTOLIC BLOOD PRESSURE: 73 MMHG | RESPIRATION RATE: 18 BRPM | HEART RATE: 68 BPM | TEMPERATURE: 98 F | OXYGEN SATURATION: 93 % | HEIGHT: 64.5 IN | BODY MASS INDEX: 32.89 KG/M2 | WEIGHT: 195 LBS

## 2025-06-06 DIAGNOSIS — Z51.11 ENCOUNTER FOR CHEMOTHERAPY MANAGEMENT: ICD-10-CM

## 2025-06-06 DIAGNOSIS — Z95.810 ICD (IMPLANTABLE CARDIOVERTER-DEFIBRILLATOR) IN PLACE: ICD-10-CM

## 2025-06-06 DIAGNOSIS — C34.91 ADENOCARCINOMA OF RIGHT LUNG (HCC): Primary | ICD-10-CM

## 2025-06-06 NOTE — PROGRESS NOTES
Cancer Center Progress Note    Patient Name: Caron Daniels   YOB: 1960   Medical Record Number: [id number]   CSN: [id number]   Consulting Physician: Elías Cole MD  Referring Physician(s): Marce Araya MD  Date of Visit: 6/6/2025    Chief Complaint:  Non small cell lung cancer-Adenocarcinoma, [id number], stage IIIB     History of Present Illness:     Caron Daniels is a 65 year old female that was seen today in the Cancer Center for evaluation of the above conditions. Patient has PMH relevant for extensive tobacco use history, nonischemic cardiomyopathy s/p AICD, COPD and prior ETOH use who was hospitalized from 3/17/24 through 3/24/24 for influenza, hypercapnic respiratory failure.  During that hospitalization patient underwent CT chest PE imaging on 3/17/24 which revealed the presence of right lower lobe pulmonary mass concerning for cancer measuring 3.8 x 2.4 cm. She was also noted to have 0.1 cm right hilar nodule, 0.9 cm left hilar nodule as well as 1.1 cm subcarinal lymph node clinically concerning for lymph node disease involvement with lung cancer. There was a 2.1cm groundglass opacity in the right lower lobe which may be infectious/inflammatory or possibly cancer related.  Caron reports feeling improved after the hospitalization.  She has regained the weight she has lost. Patient denies chest pain, dyspnea, nausea, vomiting, abdominal pain, no systemic signs of illness, no reports of voice change, cough or hemoptysis endorsed.    Interval History:  Patient returns s/p getting insurance coverage in 1/2025. She quit smoking in Spring,2024. Pt returns following biopsy of subcarinal node on 5/21 and right lower lobe mass returned w/ NSCLC-adenocarcinoma; CK+, TTF-1+, neg for CK20, p40, synatophysin and CD56. Her PET scan shows PET avidity concerning for N3 disease with bilateral perihilar and mediastinal  lymph nodes involved. Pt reports feeling well w/o focus of pain or systemic  signs of illness over the last few months.    Past Medical History:  Past Medical History:    Alcohol use disorder in remission    Anesthesia complication    woke up during procedure  \" it takes alot to knock me out\"    Arrhythmia    Cardiomyopathy (HCC)    CHF (congestive heart failure) (HCC)    Congestive heart disease (HCC)    last echo 3/2024 EF 50-55%    COPD (chronic obstructive pulmonary disease) (HCC)    Pink eye    Sleep apnea    recent diagnosis- no current treatment    Type 2 diabetes mellitus without complication, without long-term current use of insulin (HCC)    Visual impairment    glasses       Past Surgical History:  Past Surgical History:   Procedure Laterality Date    Bronchoscopy,diagnostic  05/21/2025    Dr. Odonnell; robot-assisted navigational bronchoscopy and endobronchial ultrasound, lung nodules    Cardiac defibrillator placement      Conization cervix,knife/laser  1987     insert dual electrode pmkr or icd  2009       Family Medical History:  Family History   Problem Relation Age of Onset    Obesity Mother     Heart Attack Mother     Hypertension Father     Other (Parkinsons) Father     Arrhythmia Brother     No Known Problems Brother     Dementia Maternal Grandmother     Heart Attack Maternal Grandfather     Hypertension Paternal Grandmother     Heart Attack Paternal Grandmother     No Known Problems Paternal Grandfather     Breast Cancer Neg     Colon Cancer Neg        Gyne History:  OB History   No obstetric history on file.       Social History:  Social History     Socioeconomic History    Marital status:      Spouse name: Not on file    Number of children: Not on file    Years of education: Not on file    Highest education level: Not on file   Occupational History    Not on file   Tobacco Use    Smoking status: Former     Current packs/day: 0.00     Average packs/day: 3.5 packs/day for 55.0 years (192.5 ttl pk-yrs)     Types: Cigarettes, Cigars     Start date: 03/1969     Quit  date: 2024     Years since quittin.2    Smokeless tobacco: Never   Vaping Use    Vaping status: Never Used   Substance and Sexual Activity    Alcohol use: No     Comment: formerly drank heavily; sober since 15    Drug use: Not Currently     Types: Cannabis    Sexual activity: Not Currently     Partners: Male   Other Topics Concern    Caffeine Concern Not Asked    Exercise Not Asked    Seat Belt Not Asked    Special Diet Not Asked    Stress Concern Not Asked    Weight Concern Not Asked   Social History Narrative    Relationships:  - Gene    Children: Jaimie (adult M)    Pets: Dog and Cat    School: N/A    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    Interests: Watches TV, playing game on phone.     Spiritual: Catholic - goes to [employer] in [place].      Social Drivers of Health     Food Insecurity: No Food Insecurity (3/17/2024)    Food Insecurity     Food Insecurity: Never true   Transportation Needs: No Transportation Needs (3/17/2024)    Transportation Needs     Lack of Transportation: No   Housing Stability: Low Risk  (3/17/2024)    Housing Stability     Housing Instability: No     Housing Instability Emergency: Not on file     Crib or Bassinette: Not on file       Allergies:   Allergies   Allergen Reactions    Augmentin [Amoxicillin-Pot Clavulanate] OTHER (SEE COMMENTS)     Per patient \"It makes me sick\"       Current Medications:  No outpatient medications have been marked as taking for the 25 encounter (Office Visit) with Elías Cole MD.       Review of Systems:    Constitutional: Negative for anorexia, chills, fatigue, fevers, night sweats and weight loss.  Eyes: Negative for visual disturbance, irritation and redness.  Respiratory: Negative for cough, hemoptysis, chest pain, or dyspnea on exertion.  Gastrointestinal: Negative for dysphagia, odynophagia, reflux symptoms, nausea, vomiting, change in bowel habits, diarrhea, constipation and  abdominal pain.  Integument/breast: Negative for rash, skin lesions, and pruritus.  Hematologic/lymphatic: Negative for easy bruising, bleeding, and lymphadenopathy.  Musculoskeletal: Negative for myalgias, arthralgias, muscle weakness.  Neurological: Negative for headaches, dizziness, speech problems, gait problems and weakness.    A comprehensive 14 point review of systems was completed.  Pertinent positives and negatives noted in the the HPI.     Vital Signs:  Resp 18   Ht 1.638 m (5' 4.5\")   Wt 88.5 kg (195 lb)   BMI 32.95 kg/m²     Physical Examination:    General: Patient is alert and oriented x 3, not in acute distress.  Psych: Friendly, cooperative with appropriate questions and responses  HEENT: EOMs intact. Oropharynx is clear.   Neck: No palpable lymphadenopathy. Neck is supple.  Lymphatics: There is no palpable lymphadenopathy throughout in the cervical, supraclavicular, axillary, or inguinal regions.  Chest: Clear to auscultation. No wheezes or rales.  Heart: Regular rate and rhythm. S1S2 normal.  Abdomen: Soft, non tender with good bowel sounds.  No hepatosplenomegaly.  No palpable mass.  Extremities: No edema or calf tenderness.  Neurological: Grossly intact.     Performance Status:    ECOG 0: Fully active, able to carry on all pre-disease performance without restriction      Labs:    Lab Results   Component Value Date/Time    WBC 9.1 01/16/2025 04:00 PM    RBC 4.46 01/16/2025 04:00 PM    HGB 13.4 01/16/2025 04:00 PM    HCT 40.3 01/16/2025 04:00 PM    MCV 90.4 01/16/2025 04:00 PM    MCH 30.0 01/16/2025 04:00 PM    MCHC 33.3 01/16/2025 04:00 PM    RDW 13.2 01/16/2025 04:00 PM    NEPRELIM 5.97 01/16/2025 04:00 PM    .0 01/16/2025 04:00 PM       Lab Results   Component Value Date/Time    GLU 94 01/16/2025 04:00 PM    BUN 16 01/16/2025 04:00 PM    CREATSERUM 0.90 01/16/2025 04:00 PM    GFRNAA >60 03/14/2017 05:56 AM    CA 9.4 01/16/2025 04:00 PM    ALB 4.7 01/16/2025 04:00 PM     (H)  01/16/2025 04:00 PM    K 3.9 01/16/2025 04:00 PM     01/16/2025 04:00 PM    CO2 32.0 01/16/2025 04:00 PM    ALKPHO 64 01/16/2025 04:00 PM    AST 14 01/16/2025 04:00 PM    ALT 11 01/16/2025 04:00 PM       Imaging:    PET/CT scan 6/3/25  Impression   CONCLUSION:  1. Hypermetabolic right lower lobe mass, compatible primary malignancy.       2. There are additional hypermetabolic satellite nodules which are concerning for intrathoracic metastatic disease.     3. Bilateral perihilar and mediastinal lymph nodes are hypermetabolic, also concerning for waqar metastatic involvement.       4. No definite extrathoracic metastatic disease is identified.     5. Ascending thoracic aortic ectasia.     6. Dilatation of the main pulmonary artery trunk may relate to underlying pulmonary hypertension.       7. Lesser incidental findings as above.       Pathology:    Final Diagnosis: 5/23/25      Right lower lobe, transbronchial biopsy:   Poorly differentiated adenocarcinoma.  See comment.     Comment:  The patient has a history of a spiculated right lower lobe mass measuring 4.2 x 2.3 cm, as well as additional pulmonary nodules in the right upper and lower lobes, per notes.     The right lower lobe lung biopsy shows fragments of alveolated lung parenchyma with an infiltrative tumor consisting of atypical cohesive epithelioid cells with pleomorphic nuclei, prominent nucleoli and eosinophilic cytoplasm. Touch preps performed on the biopsy show similar atypical cells.     Immunohistochemical stains performed on the biopsy demonstrate that the tumor cells are positive for pankeratin (AE1/AE3), CK7, and TTF1, and negative for CK20, p40, synaptophysin, and CD56.     The morphological and immunohistochemical findings support the diagnosis of poorly differentiated adenocarcinoma, solid type.     This diagnosis was conveyed to Dr. Odonnell at 2:58 p.m. on 5/23/2025.     Final Diagnosis:     A. Lung, right lower lobe; endobronchial ultrasound  fine needle aspiration:  Adequacy: Satisfactory for evaluation  General Category: Positive for malignancy  Diagnosis:  Poorly differentiated adenocarcinoma  See comment     B. Lymph node, station 7; endobronchial ultrasound fine needle aspiration:  Adequacy: Satisfactory for evaluation  General Category: Positive for malignancy  Diagnosis:  Metastatic adenocarcinoma    See comment     Electronically signed by Armen Gooden MD on 5/23/2025 at 1506 CDT        Final Diagnosis Comment      The patient has a history of a spiculated right lower lobe mass measuring 4.2 x 2.3 cm, as well as additional pulmonary nodules in the right upper and lower lobes, per notes.     The right lower lobe fine needle aspirate shows rare clusters of atypical cells with hyperchromatic, pleomorphic nuclei and eosinophilic cytoplasm. A cell block performed for the specimen is markedly hypocellular and shows only peripheral blood elements.      The morphological findings in the right lower lobe FNA support the diagnosis of poorly differentiated adenocarcinoma. This diagnosis was made in correlation with further workup performed on concurrent right lower lobe biopsy specimen [id number].     The lymph node station 7 fine needle aspirate shows atypical cells morphologically compatible with those seen in the right lower lobe fine needle aspirate and biopsy specimens, in a background of bronchial epithelial cells. A cell block performed for the specimen shows similar atypical cells. Immunohistochemical stains performed on the cell block demonstrate that the atypical cells are positive for pankeratin (AE1/AE3), CK7, and TTF1, and negative for CK20. These findings support the diagnosis of metastatic adenocarcinoma. No definitive lymph node elements are seen in this sampling. Clinical correlation is recommended.         Impression:    No diagnosis found.    PMH relevant for extensive tobacco use history, nonischemic cardiomyopathy s/p AICD, COPD and  prior ETOH use presents for an evaluation of RLL mass consistent with for lung cancer    Plan:    1.) Right lung mass w/ extensive tobacco use hx, [id number], stage IIIB--Adenocarcinoma    --discussed with pt and her daughter-in-law that she appears to have stage IIIB disease by imaging  --typically we need to confirm N3 disease by pathological biopsy of those types of lymph nodes  --will review her case at multidisciplinary tumor board   --pt was informed that she may be deemed inoperable and have to be treated with definitive chemoRT followed by consolidative immunotherapy with durvalumab for 1 yr  --we reviewed side effects of weekly low dose carbo AUC 2+Taxol 50 mg/m2 would be given concomitantly with RT; so rad/onc referral was placed    --orders placed for MRI brain imaging; pt mentions that she spoke with MRI department. They are aware of her [employer] ICD.   --The MRI dept will verify with the [employer] staff on a day that they are on site if Caron's device is compatible, so the pt can get the MRI completed safely.    --pt will f/u in 2 weeks    2.) Pacer w/ ICD in place    --needs to follow with a cardiologist as she mentions the battery for her ICD is dead  --EF in 3/24 was noted to be 50-55%    3.) Hx of ETOH use    -- Mild elevation of AST noted from end of 3/2023; reports being sober since 2014, has not had any abdominal imaging re: possible cirrhosis features of the liver  --recent LFT's w/in normal limits    MDM: High Risk    Elías Cole MD  [facility]  [address] [place] [address]

## 2025-06-12 ENCOUNTER — PATIENT MESSAGE (OUTPATIENT)
Dept: INTERNAL MEDICINE CLINIC | Facility: CLINIC | Age: 65
End: 2025-06-12

## 2025-06-12 DIAGNOSIS — E78.5 HYPERLIPIDEMIA, UNSPECIFIED HYPERLIPIDEMIA TYPE: ICD-10-CM

## 2025-06-12 DIAGNOSIS — Z91.09 ENVIRONMENTAL ALLERGIES: ICD-10-CM

## 2025-06-12 DIAGNOSIS — H57.10 DISCOMFORT OF EYE, UNSPECIFIED LATERALITY: Primary | ICD-10-CM

## 2025-06-16 RX ORDER — ATORVASTATIN CALCIUM 40 MG/1
40 TABLET, FILM COATED ORAL NIGHTLY
Qty: 90 TABLET | Refills: 3 | Status: SHIPPED | OUTPATIENT
Start: 2025-06-16

## 2025-06-16 RX ORDER — MONTELUKAST SODIUM 10 MG/1
10 TABLET ORAL NIGHTLY
Qty: 90 TABLET | Refills: 3 | Status: SHIPPED | OUTPATIENT
Start: 2025-06-16

## 2025-06-16 NOTE — TELEPHONE ENCOUNTER
Refill passed per Clinic protocol.  Requested Prescriptions   Pending Prescriptions Disp Refills    montelukast 10 MG Oral Tab 90 tablet 3     Sig: Take 1 tablet (10 mg total) by mouth nightly.       Asthma & COPD Medication Protocol Passed - 6/16/2025  9:59 AM        Passed - Appointment in past 6 or next 3 months      Recent Outpatient Visits              1 week ago Adenocarcinoma of right lung (HCC)    [facility] Hematology Oncology [place] Elías Claudio MD    Office Visit    2 months ago Chronic obstructive pulmonary disease, unspecified COPD type (HCC)    [facility] Edil Odonnell DO    Office Visit    3 months ago Health maintenance examination    [facility] Deondre Calderon MD    Office Visit    3 months ago Lung mass    [facility] Edil Odonnell DO    Office Visit    5 months ago Type 2 diabetes mellitus without complication, without long-term current use of insulin (HCC)    [facility] Deondre Calderon MD    Office Visit          Future Appointments         Provider Department Appt Notes    In 2 days Edil Odonnell DO [facility] 3 months    In 3 days Sai Fletcher MD; [facility] RAD ONC RN [facility] - Rad/Onc consult ref dr claudio dx lung ok prior to mri per rn    In 1 week Elías Claudio MD [facility] Hematology Oncology [place] review PET.md    In 1 month [facility] RN RADIOLOGY 1; [facility] MRI RM1 (1.5T WIDE) [facility] MRI     In 2 months Deondre Calderon MD [facility]                     Passed - Medication is active on med list          atorvastatin 40 MG Oral Tab 90 tablet 3     Sig: Take 1 tablet (40 mg total) by mouth nightly.        Cholesterol Medication Protocol Passed - 6/16/2025  9:59 AM        Passed - ALT < 80     Lab Results   Component Value Date    ALT 11 01/16/2025             Passed - ALT resulted within past year        Passed - Lipid panel within past 12 months     Lab Results   Component Value Date    CHOLEST 173 01/16/2025    TRIG 119 01/16/2025    HDL 62 (H) 01/16/2025    LDL 90 01/16/2025    VLDL 19 01/16/2025    NONHDLC 111 01/16/2025             Passed - In person appointment or virtual visit in the past 12 mos or appointment in next 3 mos     Recent Outpatient Visits              1 week ago Adenocarcinoma of right lung (HCC)    [facility] Hematology Oncology [place] Elías Claudio MD    Office Visit    2 months ago Chronic obstructive pulmonary disease, unspecified COPD type (HCC)    [facility] Edil Odonnell DO    Office Visit    3 months ago Health maintenance examination    [facility] [place]    Office Visit    3 months ago Lung mass    [facility] Edil Odonnell DO    Office Visit    5 months ago Type 2 diabetes mellitus without complication, without long-term current use of insulin (HCC)    [facility] [place]    Office Visit          Future Appointments         Provider Department Appt Notes    In 2 days Edil Odonnell DO [facility] 3 months    In 3 days Sai Fletcher MD; [facility] RAD ONC RN [facility] - Rad/Onc consult ref dr claudio dx lung ok prior to mri per rn    In 1 week Elías Claudio MD [facility] Hematology Oncology [place] review PET.md    In 1 month [facility] RN RADIOLOGY 1; [facility] MRI RM1 (1.5T WIDE) [facility] MRI     In 2 months Deondre Calderon MD [facility], [facility], [place]                     Passed - Medication is active on med list

## 2025-06-18 ENCOUNTER — TELEPHONE (OUTPATIENT)
Dept: INTERNAL MEDICINE CLINIC | Facility: CLINIC | Age: 65
End: 2025-06-18

## 2025-06-18 ENCOUNTER — OFFICE VISIT (OUTPATIENT)
Dept: PULMONOLOGY | Facility: CLINIC | Age: 65
End: 2025-06-18
Payer: COMMERCIAL

## 2025-06-18 VITALS
SYSTOLIC BLOOD PRESSURE: 138 MMHG | BODY MASS INDEX: 33.29 KG/M2 | RESPIRATION RATE: 18 BRPM | DIASTOLIC BLOOD PRESSURE: 81 MMHG | HEIGHT: 64 IN | WEIGHT: 195 LBS | OXYGEN SATURATION: 89 % | HEART RATE: 87 BPM

## 2025-06-18 DIAGNOSIS — R09.02 HYPOXEMIA: ICD-10-CM

## 2025-06-18 DIAGNOSIS — J44.1 COPD EXACERBATION (HCC): Primary | ICD-10-CM

## 2025-06-18 DIAGNOSIS — C34.90 MALIGNANT NEOPLASM OF LUNG, UNSPECIFIED LATERALITY, UNSPECIFIED PART OF LUNG (HCC): ICD-10-CM

## 2025-06-18 PROCEDURE — 3008F BODY MASS INDEX DOCD: CPT | Performed by: INTERNAL MEDICINE

## 2025-06-18 PROCEDURE — 99214 OFFICE O/P EST MOD 30 MIN: CPT | Performed by: INTERNAL MEDICINE

## 2025-06-18 PROCEDURE — 3079F DIAST BP 80-89 MM HG: CPT | Performed by: INTERNAL MEDICINE

## 2025-06-18 PROCEDURE — 3075F SYST BP GE 130 - 139MM HG: CPT | Performed by: INTERNAL MEDICINE

## 2025-06-18 RX ORDER — PREDNISONE 20 MG/1
40 TABLET ORAL DAILY
Qty: 10 TABLET | Refills: 0 | Status: SHIPPED | OUTPATIENT
Start: 2025-06-18 | End: 2025-06-23

## 2025-06-18 NOTE — H&P
Pulmonary Medicine Outpatient Progress Note           Reason for Consultation and CC: lung mass, COPD     Referring Physician: Dr. Calderon       Subjective:  Seen for f/u on 6/18/25.  Since the last visit, underwent a bronchoscopy with right lung biopsy and lymph node bx showed poorly differentiated adenoCA.  Completed PET scan which showed uptake in the lung nodules and left hilar LN as well.  Saw Dr. Cole from oncology.  Remains on Symbicort (160/4.5) 2 puffs BID and PRN Albuterol.  Hasn't completed PFTs which were previously ordered.      From the previous visit.  Seen for f/u on 3/19/25.   Completed a chest CT showed enlarging lung nodules. Advised to get a PET scan and plan to schedule a biopsy.   Also ordered PFTs not done yet.   She hasn't had these tests done and reports financial problems and cost as the main reason. Wants to wait until she turns 66 and has medicare. I strongly advised her against this as her repeat CT already shows the nodules are increasing and very concerning for malignancy. I relayed this to her. She is willing to have either the PET scan or the biopsy. I told her the biopsy is more important.  I began discussing performing a bronchoscopy with general anesthesia at which point she told me she's had very bad experiences with anesthesia in the past and is very reluctant to undergo general anesthesia. I told her the alternative is to see if interventional radiology can perform a CT guided biopsy. She seems to be willing to undergo this type of biopsy.   She remains on Symbicort (160/4.5) 2 puffs BID (for the past month), Albuterol she uses 4-8 hrs for shortness of breath, chest tightness, wheezing and hx of COPD.  Symptoms consist of coughing, shortness of breath  No wheezing, chest congestion  Has seen Dr. Cole from Oncology in the past.      From the initial consultation  HPI: I had the pleasure of seeing Caron Daniels for a Pulmonary Medicine consultation on 2/19/25.  65 yo  woman with hx of ETOH in remission, cardiomyopathy, CHF, COPD, DM2, lung mass previously saw [facility] (Dr. Jani Alfonso) while hospitalized in 3/2024. Last imaging was in 3/2024 which confirmed the lung mass. Pt was w/o insurance and had no further imaging since. Took social security early to pay for insurance starting 1/2025. Now here for further pulmonary evaluation.  On Symbicort (160/4.5) 2 puffs BID (for the past month), Albuterol she uses 3-5 hrs for shortness of breath, chest tightness, wheezing and hx of COPD.  Uses Duoneb in the morning.   Not on supplemental oxygen.  Estimates last PFTs were in 2010.    Follows with cardiology @ [facility]        REVIEW OF SYSTEMS:  Positives and negatives as stated in HPI. Remainder of 12 pt review of systems otherwise are negative.       PAST MEDICAL HISTORY:  Past Medical History:    Alcohol use disorder in remission    Anesthesia complication    woke up during procedure  \" it takes alot to knock me out\"    Arrhythmia    Cardiomyopathy (HCC)    CHF (congestive heart failure) (HCC)    Congestive heart disease (HCC)    last echo 3/2024 EF 50-55%    COPD (chronic obstructive pulmonary disease) (HCC)    Pink eye    Sleep apnea    recent diagnosis- no current treatment    Type 2 diabetes mellitus without complication, without long-term current use of insulin (HCC)    Visual impairment    glasses          PAST SURGICAL HISTORY:  Past Surgical History:   Procedure Laterality Date    Bronchoscopy,diagnostic  05/21/2025    Dr. Odonnell; robot-assisted navigational bronchoscopy and endobronchial ultrasound, lung nodules    Cardiac defibrillator placement      Conization cervix,knife/laser  1987     insert dual electrode pmkr or icd  2009          PAST FAMILY HISTORY:  Family History   Problem Relation Age of Onset    Obesity Mother     Heart Attack Mother     Hypertension Father     Other (Parkinsons) Father     Arrhythmia Brother     No Known Problems Brother     Dementia Maternal  Grandmother     Heart Attack Maternal Grandfather     Hypertension Paternal Grandmother     Heart Attack Paternal Grandmother     No Known Problems Paternal Grandfather     Breast Cancer Neg     Colon Cancer Neg           PAST SOCIAL HISTORY:  Social History     Socioeconomic History    Marital status:    Tobacco Use    Smoking status: Former     Current packs/day: 0.00     Average packs/day: 3.5 packs/day for 55.0 years (192.5 ttl pk-yrs)     Types: Cigarettes, Cigars     Start date: 1969     Quit date: 2024     Years since quittin.2    Smokeless tobacco: Never   Vaping Use    Vaping status: Never Used   Substance and Sexual Activity    Alcohol use: No     Comment: formerly drank heavily; sober since 15    Drug use: Not Currently     Types: Cannabis    Sexual activity: Not Currently     Partners: Male   Social History Narrative    Relationships:  - Gene    Children: Jaimie (adult M)    Pets: Dog and Cat    School: N/A    Work: Works for [employer]    Origin: Born in [place].    Interests: Watches TV, playing game on phone.     Spiritual: Cheondoism - goes to [employer] in [place].      Social Drivers of Health     Food Insecurity: No Food Insecurity (3/17/2024)    Food Insecurity     Food Insecurity: Never true   Transportation Needs: No Transportation Needs (3/17/2024)    Transportation Needs     Lack of Transportation: No   Housing Stability: Low Risk  (3/17/2024)    Housing Stability     Housing Instability: No   Quit smoking in 3/2024. Smoked for 55 yrs (started at age 9) @ 1 PPD  Lives with   Has a dog and cat  Employed: works at [employer]      ALLERGIES:  Allergies[1]  Augmentin-makes stomach hurt       MEDS:  Medications Ordered Prior to Encounter[2]       PHYSICAL EXAM:  There were no vitals taken for this visit.  CONSTITUTIONAL: alert, oriented, no apparent distress  HEENT: atraumatic normocephalic  MOUTH: mucous membranes are moist. No  OP exudates  NECK/THROAT: no JVD. Trachea midline. No obvious thyromegaly  LUNG: diminished BS but clear b/l no wheezing, crackles. Chest symmetric with respiratory motion  HEART: regular rate and rhythm, no obvious murmers or gallops note  ABD: soft non tender. + bowel sounds. No organomegaly noted  EXT: no clubbing, cyanosis, or edema noted. Pulses intact grossly  NEURO/MUSCULOSKELETAL: no gross deficits  SKIN: warm, dry. No obvious lesions noted  LYMPH: no obvious LAD       IMAGES:  PET 6/2025  CONCLUSION:   1. Hypermetabolic right lower lobe mass, compatible primary malignancy.    2. There are additional hypermetabolic satellite nodules which are concerning for intrathoracic metastatic disease.   3. Bilateral perihilar and mediastinal lymph nodes are hypermetabolic, also concerning for waqar metastatic involvement.    4. No definite extrathoracic metastatic disease is identified.   5. Ascending thoracic aortic ectasia.   6. Dilatation of the main pulmonary artery trunk may relate to underlying pulmonary hypertension.    7. Lesser incidental findings as above.     Chest CT 3/14/25  CONCLUSION:   1. Spiculated right lower lobe mass measuring 4.2 x 2.3 cm, as well as additional pulmonary nodules in the right upper and lower lobes have slightly increased in size compared to 03/17/2024 exam.  There is also a stable mildly enlarged subcarinal lymph node.  Differential considerations include a malignant process such as a primary right lower lobe lung cancer with ipsilateral lung and mediastinal waqar metastases, as well as an atypical inflammatory/granulomatous process or atypical infectious process (such as fungal pneumonia).  Further assessment with a PET/CT and/or percutaneous biopsy is recommended.   2. Moderate emphysema, mild bronchial wall thickening and mucous plugging at the lung bases are grossly unchanged, the latter of which may relate to chronic inflammatory bronchiolitis/small airways infection or chronic  aspiration.  Right lower lobe   ground-glass opacity seen on the previous CT that likely related to inflammatory bronchiolitis has resolved.   3. Stable cardiomegaly in this patient with a cardiac AICD.   4. Lesser incidental findings as above.     Chest CT 3/2024  No evidence of acute pulmonary embolism to the level of the segmental pulmonary artery branches   Spiculated 3.8 cm right lower lobe pulmonary mass.  This is concerning for a primary lung neoplasia, with infection being considered less likely. Recommend further assessment with tissue sampling.   Multiple additional pulmonary nodules, which of which are spiculated measuring up to 1.5 cm, concerning for sites sites of pulmonary metastatic disease.   A 2.1 cm ground-glass and reticular opacity of the right lower lobe may be infectious or inflammatory in etiology, with primary lung neoplasia being within the differential.  Further assessment with tissue sampling and/or FDG PET-CT would be helpful.   Bilateral hilar and subcarinal lymph nodes measuring up to 1.1 cm.  These are concerning for waqar metastatic disease. Reactive etiologies a differential consideration.        LABS:  Lab Results   Component Value Date    WBC 9.1 01/16/2025    RBC 4.46 01/16/2025    HGB 13.4 01/16/2025    HCT 40.3 01/16/2025    MCV 90.4 01/16/2025    MCH 30.0 01/16/2025    MCHC 33.3 01/16/2025    MPV 10.3 03/14/2017     No results for input(s): \"GLU\", \"BUN\", \"CREATSERUM\", \"GFRAA\", \"GFRNAA\", \"EGFRCR\", \"CA\", \"ALB\", \"NA\", \"K\", \"CL\", \"CO2\", \"ALKPHO\", \"AST\", \"ALT\", \"BILT\", \"TP\" in the last 168 hours.       PFTS none    PSG/CPAP titration results:  none       ASSESSMENT/PLAN:  COPD likely at least moderate. Possible exac  -On Symbicort (160/4.5) 2 puffs BID. Advised her to rinse/gargle with water and spit after each use (pt was doing it 30-40 min later)  -Continue PRN Albuterol HFA and Duoneb neb  -Fill the prescription for Spiriva Handihaler 2 puffs once a day  -Fill the prescription  for Prednisone 10 mg tabs and take with food as follows:  -4 tabs once a day for 2 days then decrease to  -3 tabs once a day for 2 days then decrease to  -2 tabs once a day for 2 days then decrease to  -1 tab once a day for 2 days  -Check PFTs    Lung mass  -Ordered PET scan but pt reports has financial difficulty to have it done and wants to wait until next year.   -She is willing to have a lung biopsy done but wants to avoid general anesthesia b/c of previous bad experiences with general anesthesia. Discussed option of interventional radiology performing a CT guided lung biopsy  -I emphasized the need to have the enlarging lung nodules evaluated as soon as possible    Hx of CHF, cardiomyopathy  -Follows with cardiology at [facility] in 3 months    Thank you for the opportunity to care for Caron Daniels.    I spent a total of 50 minutes in direct contact with the patient and reviewing pertinent outside records on the day of the encounter and planning for next steps (as noted below).     MARIA C Odonnell DO, MPH  Pulmonary and Critical Care Medicine  [facility] Pulmonary and Critical Care Medicine              [1]   Allergies  Allergen Reactions    Augmentin [Amoxicillin-Pot Clavulanate] OTHER (SEE COMMENTS)     Per patient \"It makes me sick\"   [2]   Current Outpatient Medications on File Prior to Visit   Medication Sig Dispense Refill    montelukast 10 MG Oral Tab Take 1 tablet (10 mg total) by mouth nightly. 90 tablet 3    atorvastatin 40 MG Oral Tab Take 1 tablet (40 mg total) by mouth nightly. 90 tablet 3    Budesonide-Formoterol Fumarate (SYMBICORT) 160-4.5 MCG/ACT Inhalation Aerosol Inhale 2 puffs into the lungs 2 (two) times daily. 30.6 g 3    ipratropium-albuterol 0.5-2.5 (3) MG/3ML Inhalation Solution Use 1 vial via nebulizer every 6 hours as needed 180 mL 3    Neomycin-Polymyxin-Dexameth 3.5-38476-2.1 Ophthalmic Suspension  (Patient not taking: Reported on 6/6/2025)      aspirin 325 MG  Oral Tab Take 1 tablet (325 mg total) by mouth every 6 (six) hours as needed for Pain.      loteprednol 0.5 % Ophthalmic Suspension 1 drop in the morning and 1 drop at noon and 1 drop in the evening and 1 drop before bedtime.      tiotropium 18 MCG Inhalation Cap Inhale 1 capsule (18 mcg total) into the lungs daily. 30 capsule 5    predniSONE 10 MG Oral Tab 4 tabs/day x 2 days, 3 tabs/day x 2 days, 2 tabs/day x 2 days, 1 tab/day x 2 days. (Patient not taking: Reported on 6/6/2025) 20 tablet 0    carvedilol 6.25 MG Oral Tab Take 1 tablet (6.25 mg total) by mouth in the morning and 1 tablet (6.25 mg total) in the evening. Take with meals.      albuterol (PROVENTIL HFA) 108 (90 Base) MCG/ACT Inhalation Aero Soln Inhale 1 puff into the lungs every 4 (four) hours as needed for Wheezing. 1 each 5    enalapril 2.5 MG Oral Tab Take 1 tablet (2.5 mg total) by mouth 2 (two) times daily. 180 tablet 3    aspirin 81 MG Oral Chew Tab Chew 1 tablet (81 mg total) by mouth daily. 30 tablet 0    furosemide 40 MG Oral Tab Take 1 tablet (40 mg total) by mouth as needed.      loratadine 10 MG Oral Tab Take 1 tablet (10 mg total) by mouth in the morning.      omeprazole 20 MG Oral Capsule Delayed Release Take 1 capsule (20 mg total) by mouth every morning.      Potassium Chloride ER 10 MEQ Oral Tab CR Take 2 tablets (20 mEq total) by mouth in the morning.       No current facility-administered medications on file prior to visit.

## 2025-06-18 NOTE — PATIENT INSTRUCTIONS
Continue with the Symbicort (160/4.5 mcg). Take 2 puffs twice a day. Make sure to rinse mouth and gargle with water and spit after each use.     Continue Spiriva 2 puffs once a day.    Continue the nebulizer and the Albuterol inhaler as needed.    It is ok to schedule the Pulmonary Function Testing after the radiation treatments.     Fill the Prednisone 20 mg tabs. Take 2 tabs once a day with food for 5 days.     Come back in 2 weeks for a 6 minute walk test.     Come back in 3 months.

## 2025-06-18 NOTE — PROGRESS NOTES
Pulmonary Medicine Outpatient Progress Note           Reason for Consultation and CC: lung mass, COPD     Referring Physician: Dr. Calderon       Subjective:  Seen for f/u on 6/18/25.  Since the last visit, underwent a bronchoscopy with right lung biopsy and lymph node bx showed poorly differentiated adenoCA.  Completed PET scan which showed uptake in the lung nodules and left hilar LN as well.  Saw Dr. Cole from oncology.  Plans to see Dr. Toscano from rad/onc.  MRI brain ordered-scheduled for next month.   Remains on Symbicort (160/4.5) 2 puffs BID, Spiriva inhaler in the afternoon, and PRN Albuterol (which she isn't needing as much). Uses a nebulizer in the morning.  Hasn't completed PFTs which were previously ordered.  Breathing wise, feels ok. Feels like she needs to see cardiology.   Requesting Prednisone.      From the previous visit.  Seen for f/u on 3/19/25.   Completed a chest CT showed enlarging lung nodules. Advised to get a PET scan and plan to schedule a biopsy.   Also ordered PFTs not done yet.   She hasn't had these tests done and reports financial problems and cost as the main reason. Wants to wait until she turns 66 and has medicare. I strongly advised her against this as her repeat CT already shows the nodules are increasing and very concerning for malignancy. I relayed this to her. She is willing to have either the PET scan or the biopsy. I told her the biopsy is more important.  I began discussing performing a bronchoscopy with general anesthesia at which point she told me she's had very bad experiences with anesthesia in the past and is very reluctant to undergo general anesthesia. I told her the alternative is to see if interventional radiology can perform a CT guided biopsy. She seems to be willing to undergo this type of biopsy.   She remains on Symbicort (160/4.5) 2 puffs BID (for the past month), Albuterol she uses 4-8 hrs for shortness of breath, chest tightness, wheezing and hx of  COPD.  Symptoms consist of coughing, shortness of breath  No wheezing, chest congestion  Has seen Dr. Cole from Oncology in the past.      From the initial consultation  HPI: I had the pleasure of seeing Caron Daniels for a Pulmonary Medicine consultation on 2/19/25.  63 yo woman with hx of ETOH in remission, cardiomyopathy, CHF, COPD, DM2, lung mass previously saw [facility] (Dr. Jani Alfonso) while hospitalized in 3/2024. Last imaging was in 3/2024 which confirmed the lung mass. Pt was w/o insurance and had no further imaging since. Took social security early to pay for insurance starting 1/2025. Now here for further pulmonary evaluation.  On Symbicort (160/4.5) 2 puffs BID (for the past month), Albuterol she uses 3-5 hrs for shortness of breath, chest tightness, wheezing and hx of COPD.  Uses Duoneb in the morning.   Not on supplemental oxygen.  Estimates last PFTs were in 2010.    Follows with cardiology @ [facility]        REVIEW OF SYSTEMS:  Positives and negatives as stated in HPI. Remainder of 12 pt review of systems otherwise are negative.       PAST MEDICAL HISTORY:  Past Medical History:    Alcohol use disorder in remission    Anesthesia complication    woke up during procedure  \" it takes alot to knock me out\"    Arrhythmia    Cardiomyopathy (HCC)    CHF (congestive heart failure) (HCC)    Congestive heart disease (HCC)    last echo 3/2024 EF 50-55%    COPD (chronic obstructive pulmonary disease) (HCC)    Pink eye    Sleep apnea    recent diagnosis- no current treatment    Type 2 diabetes mellitus without complication, without long-term current use of insulin (HCC)    Visual impairment    glasses          PAST SURGICAL HISTORY:  Past Surgical History:   Procedure Laterality Date    Bronchoscopy,diagnostic  05/21/2025    Dr. Odonnell; robot-assisted navigational bronchoscopy and endobronchial ultrasound, lung nodules    Cardiac defibrillator placement      Conization cervix,knife/laser  1987    Hc insert  dual electrode pmkr or icd            PAST FAMILY HISTORY:  Family History   Problem Relation Age of Onset    Obesity Mother     Heart Attack Mother     Hypertension Father     Other (Parkinsons) Father     Arrhythmia Brother     No Known Problems Brother     Dementia Maternal Grandmother     Heart Attack Maternal Grandfather     Hypertension Paternal Grandmother     Heart Attack Paternal Grandmother     No Known Problems Paternal Grandfather     Breast Cancer Neg     Colon Cancer Neg           PAST SOCIAL HISTORY:  Social History     Socioeconomic History    Marital status:    Tobacco Use    Smoking status: Former     Current packs/day: 0.00     Average packs/day: 3.5 packs/day for 55.0 years (192.5 ttl pk-yrs)     Types: Cigarettes, Cigars     Start date: 1969     Quit date: 2024     Years since quittin.2     Passive exposure: Past    Smokeless tobacco: Never   Vaping Use    Vaping status: Never Used   Substance and Sexual Activity    Alcohol use: No     Comment: formerly drank heavily; sober since 15    Drug use: Not Currently     Types: Cannabis    Sexual activity: Not Currently     Partners: Male   Social History Narrative    Relationships:  - Gene    Children: Jaimie (adult M)    Pets: Dog and Cat    School: N/A    Work: Works for [employer] for [employer]    Origin: Born in [place].    Interests: Watches TV, playing game on phone.     Spiritual: Mandaen - goes to [employer] in [place].      Social Drivers of Health     Food Insecurity: No Food Insecurity (3/17/2024)    Food Insecurity     Food Insecurity: Never true   Transportation Needs: No Transportation Needs (3/17/2024)    Transportation Needs     Lack of Transportation: No   Housing Stability: Low Risk  (3/17/2024)    Housing Stability     Housing Instability: No   Quit smoking in 3/2024. Smoked for 55 yrs (started at age 9) @ 1 PPD  Lives with   Has a dog and cat  Employed: works at [employer]  agency      ALLERGIES:  Allergies[1]  Augmentin-makes stomach hurt       MEDS:  Medications Ordered Prior to Encounter[2]       PHYSICAL EXAM:  /81   Pulse 87   Resp 18   Ht 5' 4\" (1.626 m)   Wt 195 lb (88.5 kg)   SpO2 (!) 89%   BMI 33.47 kg/m²   CONSTITUTIONAL: alert, oriented, no apparent distress  HEENT: atraumatic normocephalic  MOUTH: mucous membranes are moist. No OP exudates  NECK/THROAT: no JVD. Trachea midline. No obvious thyromegaly  LUNG: mild wheezing, no crackles. Chest symmetric with respiratory motion  HEART: regular rate and rhythm, no obvious murmers or gallops note  ABD: soft non tender. + bowel sounds. No organomegaly noted  EXT: no clubbing, cyanosis, or edema noted. Pulses intact grossly  NEURO/MUSCULOSKELETAL: no gross deficits  SKIN: warm, dry. No obvious lesions noted  LYMPH: no obvious LAD       IMAGES:  PET 6/2025  CONCLUSION:   1. Hypermetabolic right lower lobe mass, compatible primary malignancy.    2. There are additional hypermetabolic satellite nodules which are concerning for intrathoracic metastatic disease.   3. Bilateral perihilar and mediastinal lymph nodes are hypermetabolic, also concerning for waqar metastatic involvement.    4. No definite extrathoracic metastatic disease is identified.   5. Ascending thoracic aortic ectasia.   6. Dilatation of the main pulmonary artery trunk may relate to underlying pulmonary hypertension.    7. Lesser incidental findings as above.     Chest CT 3/14/25  CONCLUSION:   1. Spiculated right lower lobe mass measuring 4.2 x 2.3 cm, as well as additional pulmonary nodules in the right upper and lower lobes have slightly increased in size compared to 03/17/2024 exam.  There is also a stable mildly enlarged subcarinal lymph node.  Differential considerations include a malignant process such as a primary right lower lobe lung cancer with ipsilateral lung and mediastinal waqar metastases, as well as an atypical inflammatory/granulomatous  process or atypical infectious process (such as fungal pneumonia).  Further assessment with a PET/CT and/or percutaneous biopsy is recommended.   2. Moderate emphysema, mild bronchial wall thickening and mucous plugging at the lung bases are grossly unchanged, the latter of which may relate to chronic inflammatory bronchiolitis/small airways infection or chronic aspiration.  Right lower lobe   ground-glass opacity seen on the previous CT that likely related to inflammatory bronchiolitis has resolved.   3. Stable cardiomegaly in this patient with a cardiac AICD.   4. Lesser incidental findings as above.     Chest CT 3/2024  No evidence of acute pulmonary embolism to the level of the segmental pulmonary artery branches   Spiculated 3.8 cm right lower lobe pulmonary mass.  This is concerning for a primary lung neoplasia, with infection being considered less likely. Recommend further assessment with tissue sampling.   Multiple additional pulmonary nodules, which of which are spiculated measuring up to 1.5 cm, concerning for sites sites of pulmonary metastatic disease.   A 2.1 cm ground-glass and reticular opacity of the right lower lobe may be infectious or inflammatory in etiology, with primary lung neoplasia being within the differential.  Further assessment with tissue sampling and/or FDG PET-CT would be helpful.   Bilateral hilar and subcarinal lymph nodes measuring up to 1.1 cm.  These are concerning for waqar metastatic disease. Reactive etiologies a differential consideration.        LABS:  Lab Results   Component Value Date    WBC 9.1 01/16/2025    RBC 4.46 01/16/2025    HGB 13.4 01/16/2025    HCT 40.3 01/16/2025    MCV 90.4 01/16/2025    MCH 30.0 01/16/2025    MCHC 33.3 01/16/2025    MPV 10.3 03/14/2017     No results for input(s): \"GLU\", \"BUN\", \"CREATSERUM\", \"GFRAA\", \"GFRNAA\", \"EGFRCR\", \"CA\", \"ALB\", \"NA\", \"K\", \"CL\", \"CO2\", \"ALKPHO\", \"AST\", \"ALT\", \"BILT\", \"TP\" in the last 168 hours.       PFTS none    PSG/CPAP  titration results:  none       ASSESSMENT/PLAN:  COPD likely at least moderate. Possible exac  -On Symbicort (160/4.5) 2 puffs BID. Advised her to rinse/gargle with water and spit after each use (pt was doing it 30-40 min later)  -On Spiriva inhaler 2 puffs daily  -Continue PRN Albuterol HFA and Duoneb neb  -Prednisone 40 mg x 5 days for mild exac and wheezing. Pain in her back is likely more related to her arthritis than her lungs  -Schedule PFTs-pt would like to do it after her radiation tx    Mild hypoxemia  -Discussed checking 6 MWT but pt reports she's tired today  -Will have her do a 6 MWT in 2 weeks    Hx of CHF, cardiomyopathy  -Follows with cardiology at [facility] and would like to transfer her care to cardiology at [facility]. Plans to discuss with her primary care    Lung nodules and mediastinal lymphadenopathy  -S/p bronchoscopy with ION robot and EBUS c/w poorly differentiated adenoCA  -S/p PET scan as above  -Seeing oncology (Dr. Cole) and [facility]     RTC in 3 months    Thank you for the opportunity to care for Caron Daniels.    I spent a total of 32 minutes in direct contact with the patient and reviewing pertinent outside records on the day of the encounter and planning for next steps (as noted below).     MARIA C Odonnell DO, MPH  Pulmonary and Critical Care Medicine  [facility]              [1]   Allergies  Allergen Reactions    Augmentin [Amoxicillin-Pot Clavulanate] OTHER (SEE COMMENTS)     Per patient \"It makes me sick\"   [2]   Current Outpatient Medications on File Prior to Visit   Medication Sig Dispense Refill    montelukast 10 MG Oral Tab Take 1 tablet (10 mg total) by mouth nightly. 90 tablet 3    atorvastatin 40 MG Oral Tab Take 1 tablet (40 mg total) by mouth nightly. 90 tablet 3    Budesonide-Formoterol Fumarate (SYMBICORT) 160-4.5 MCG/ACT Inhalation Aerosol Inhale 2 puffs into the lungs 2 (two) times daily. 30.6 g 3    ipratropium-albuterol  0.5-2.5 (3) MG/3ML Inhalation Solution Use 1 vial via nebulizer every 6 hours as needed 180 mL 3    Neomycin-Polymyxin-Dexameth 3.5-32853-4.1 Ophthalmic Suspension       aspirin 325 MG Oral Tab Take 1 tablet (325 mg total) by mouth every 6 (six) hours as needed for Pain.      loteprednol 0.5 % Ophthalmic Suspension 1 drop in the morning and 1 drop at noon and 1 drop in the evening and 1 drop before bedtime.      tiotropium 18 MCG Inhalation Cap Inhale 1 capsule (18 mcg total) into the lungs daily. 30 capsule 5    predniSONE 10 MG Oral Tab 4 tabs/day x 2 days, 3 tabs/day x 2 days, 2 tabs/day x 2 days, 1 tab/day x 2 days. 20 tablet 0    carvedilol 6.25 MG Oral Tab Take 1 tablet (6.25 mg total) by mouth in the morning and 1 tablet (6.25 mg total) in the evening. Take with meals.      albuterol (PROVENTIL HFA) 108 (90 Base) MCG/ACT Inhalation Aero Soln Inhale 1 puff into the lungs every 4 (four) hours as needed for Wheezing. 1 each 5    enalapril 2.5 MG Oral Tab Take 1 tablet (2.5 mg total) by mouth 2 (two) times daily. 180 tablet 3    aspirin 81 MG Oral Chew Tab Chew 1 tablet (81 mg total) by mouth daily. 30 tablet 0    furosemide 40 MG Oral Tab Take 1 tablet (40 mg total) by mouth as needed.      loratadine 10 MG Oral Tab Take 1 tablet (10 mg total) by mouth in the morning.      omeprazole 20 MG Oral Capsule Delayed Release Take 1 capsule (20 mg total) by mouth every morning.      Potassium Chloride ER 10 MEQ Oral Tab CR Take 2 tablets (20 mEq total) by mouth in the morning.       No current facility-administered medications on file prior to visit.

## 2025-06-18 NOTE — TELEPHONE ENCOUNTER
Referral to [facility] Ophthalmologist, Cornell Barney MD is pending per message received today from [facility] Care [facility].  [facility] Care Dept.  Pt informed via [employer] message. Instructions also provided for pt to check status via the [employer] Robert.

## 2025-06-18 NOTE — PROGRESS NOTES
Nursing Consultation Note  Patient: Caron Daniels  YOB: 1960  Age: 65 year old  Radiation Oncologist: Dr. Sai Toscano  Referring Physician: No ref. provider found  Diagnosis:[unfilled]  Consult Date: 6/18/2025      Chemotherapy: Plan for concurrent chemo/RT   Labs: Reviewed  Imaging: Reviewed  Is the patient of child-bearing age?         No  Has the patient received radiation therapy in the past? no  Does the patient have an implantable device?Yes Other ICD  Per pt, battery ran out in 2018.   Patient has/has had:     1. Assistive Devices: N/A    2. Flu Vaccination: yes    3. Pneumonia Vaccination:  yes    Vital Signs:   Vitals:    06/19/25 0828   BP: 152/89   Pulse: 80   Resp: 18   Temp: 98 °F (36.7 °C)   ,   Wt Readings from Last 6 Encounters:   06/19/25 85.4 kg (188 lb 3.2 oz)   06/18/25 88.5 kg (195 lb)   06/06/25 88.5 kg (195 lb)   05/21/25 86.8 kg (191 lb 6.4 oz)   03/19/25 88.5 kg (195 lb)   02/20/25 90.3 kg (199 lb)     Nursing Note: CT chest done during hospitalization in 3/2024 showed RLL lung mass. Pt had no insurance and therefore had not further imaging. Repeat CT chest 3/2025 showed enlarging lung nodules. PET scan done 6/2 showed metastatic LN involvement. S/p bronchoscopy with R lung bx and LN bx on 5/21 showed poorly differentiated adenocarcinoma and positive subcarinal LN. Met with Dr. Cole, plan for concurrent chemo/RT. Here for RT consult today. Accompanied by daughter-in-law, Susanna. Denies worsening dyspnea, cough, hemoptysis. Sees Dr. Odonnell regularly. Appetite decreased, has been eating less, denies n/v, wt loss noted. Brain MRI scheduled in July, will need sooner.         Review of Systems   Constitutional:  Positive for appetite change.   HENT: Negative.     Eyes:  Positive for pain and redness.        B/l sharp eye pain and tearing      Respiratory:  Positive for cough, shortness of breath and wheezing.         Hx COPD  Chronic cough and SOB   Cardiovascular:  Negative.    Gastrointestinal: Negative.    Endocrine: Negative.    Genitourinary: Negative.    Musculoskeletal: Negative.    Skin: Negative.    Allergic/Immunologic: Negative.    Neurological:  Positive for headaches.        Hx HA   Hematological:  Bruises/bleeds easily.   Psychiatric/Behavioral: Negative.            Allergies:  Allergies[1]    Current Medications[2]    Preferred Pharmacy:    [id number] - [place], [place] - [address] AT [facility] [address], [phone], [phone]  [address]  [facility] [address]  Phone: [phone] Fax: [phone]      Past Medical History[3]    Past Surgical History[4]    Social History     Socioeconomic History    Marital status:      Spouse name: Not on file    Number of children: Not on file    Years of education: Not on file    Highest education level: Not on file   Occupational History    Not on file   Tobacco Use    Smoking status: Former     Current packs/day: 0.00     Average packs/day: 3.5 packs/day for 55.0 years (192.5 ttl pk-yrs)     Types: Cigarettes, Cigars     Start date: 1969     Quit date: 2024     Years since quittin.3     Passive exposure: Past    Smokeless tobacco: Never   Vaping Use    Vaping status: Never Used   Substance and Sexual Activity    Alcohol use: No     Comment: formerly drank heavily; sober since 15    Drug use: Yes     Types: Cannabis     Comment: occasionally    Sexual activity: Not Currently     Partners: Male   Other Topics Concern    Caffeine Concern Not Asked    Exercise Not Asked    Seat Belt Not Asked    Special Diet Not Asked    Stress Concern Not Asked    Weight Concern Not Asked   Social History Narrative    Relationships:  - Gene    Children: Jaimie (adult M)    Pets: Dog and Cat    School: N/A    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    Interests: Watches TV, playing game on phone.     Spiritual: Temple -  goes to [employer] in [place].      Social Drivers of Health     Food Insecurity: No Food Insecurity (3/17/2024)    Food Insecurity     Food Insecurity: Never true   Transportation Needs: No Transportation Needs (3/17/2024)    Transportation Needs     Lack of Transportation: No   Housing Stability: Low Risk  (3/17/2024)    Housing Stability     Housing Instability: No     Housing Instability Emergency: Not on file     Crib or Bassinette: Not on file       ECOG:  Grade 0 - Fully active, able to carry on all predisease activities without restrictions.      Education:  Yes    Knowledge Deficit Plan Of Care:    Problem:  Knowledge Deficit    Problems related to:    Radiation therapy    Interventions:  Instruct on purpose of radiation therapy  Instruct on side effects of radiation therapy    Expected Outcomes:  Knowledge of radiation therapy  Knowledge of side effects of radiation therapy and management    Progress Toward Outcome:  Making progress    Pamphlets/Handouts Given to Patient:  Understanding radiation therapy      Are ADL's met?  Yes  Does patient feel safe in their environment?  Yes  Care decisions:  Patient and/or surrogate IS involved in care decisions.  Advanced directives:  Patient DOES NOT have advanced directives.  Transportation:  Adequate transportation available for expected visits    Pain:  Pain Loc: Eye;Pain Score: 3 (b/l eye pain)   ;    ;          [1]   Allergies  Allergen Reactions    Augmentin [Amoxicillin-Pot Clavulanate] OTHER (SEE COMMENTS)     Per patient \"It makes me sick\"   [2]   Current Outpatient Medications   Medication Sig Dispense Refill    predniSONE 20 MG Oral Tab Take 2 tablets (40 mg total) by mouth daily for 5 days. 10 tablet 0    montelukast 10 MG Oral Tab Take 1 tablet (10 mg total) by mouth nightly. 90 tablet 3    atorvastatin 40 MG Oral Tab Take 1 tablet (40 mg total) by mouth nightly. 90 tablet 3    Budesonide-Formoterol Fumarate (SYMBICORT) 160-4.5 MCG/ACT Inhalation  Aerosol Inhale 2 puffs into the lungs 2 (two) times daily. 30.6 g 3    ipratropium-albuterol 0.5-2.5 (3) MG/3ML Inhalation Solution Use 1 vial via nebulizer every 6 hours as needed 180 mL 3    aspirin 325 MG Oral Tab Take 1 tablet (325 mg total) by mouth every 6 (six) hours as needed for Pain.      loteprednol 0.5 % Ophthalmic Suspension 1 drop in the morning and 1 drop at noon and 1 drop in the evening and 1 drop before bedtime.      tiotropium 18 MCG Inhalation Cap Inhale 1 capsule (18 mcg total) into the lungs daily. 30 capsule 5    carvedilol 6.25 MG Oral Tab Take 1 tablet (6.25 mg total) by mouth in the morning and 1 tablet (6.25 mg total) in the evening. Take with meals.      albuterol (PROVENTIL HFA) 108 (90 Base) MCG/ACT Inhalation Aero Soln Inhale 1 puff into the lungs every 4 (four) hours as needed for Wheezing. 1 each 5    enalapril 2.5 MG Oral Tab Take 1 tablet (2.5 mg total) by mouth 2 (two) times daily. 180 tablet 3    aspirin 81 MG Oral Chew Tab Chew 1 tablet (81 mg total) by mouth daily. 30 tablet 0    furosemide 40 MG Oral Tab Take 1 tablet (40 mg total) by mouth as needed.      loratadine 10 MG Oral Tab Take 1 tablet (10 mg total) by mouth in the morning.      omeprazole 20 MG Oral Capsule Delayed Release Take 1 capsule (20 mg total) by mouth every morning.      Potassium Chloride ER 10 MEQ Oral Tab CR Take 2 tablets (20 mEq total) by mouth in the morning.      Neomycin-Polymyxin-Dexameth 3.5-63001-9.1 Ophthalmic Suspension  (Patient not taking: Reported on 6/19/2025)      predniSONE 10 MG Oral Tab 4 tabs/day x 2 days, 3 tabs/day x 2 days, 2 tabs/day x 2 days, 1 tab/day x 2 days. (Patient not taking: Reported on 6/19/2025) 20 tablet 0   [3]   Past Medical History:   Alcohol use disorder in remission    Anesthesia complication    woke up during procedure  \" it takes alot to knock me out\"    Arrhythmia    Cardiomyopathy (HCC)    CHF (congestive heart failure) (HCC)    Congestive heart disease (HCC)     last echo 3/2024 EF 50-55%    COPD (chronic obstructive pulmonary disease) (HCC)    Lung cancer (HCC)    Pink eye    Sleep apnea    recent diagnosis- no current treatment    Type 2 diabetes mellitus without complication, without long-term current use of insulin (HCC)    Visual impairment    glasses   [4]   Past Surgical History:  Procedure Laterality Date    Bronchoscopy,diagnostic  05/21/2025    Dr. Odonnell; robot-assisted navigational bronchoscopy and endobronchial ultrasound, lung nodules    Cardiac defibrillator placement      Conization cervix,knife/laser  1987     insert dual electrode pmkr or icd  2009

## 2025-06-19 ENCOUNTER — TELEPHONE (OUTPATIENT)
Age: 65
End: 2025-06-19

## 2025-06-19 ENCOUNTER — OFFICE VISIT (OUTPATIENT)
Dept: RADIATION ONCOLOGY | Facility: HOSPITAL | Age: 65
End: 2025-06-19
Attending: RADIOLOGY
Payer: COMMERCIAL

## 2025-06-19 ENCOUNTER — SOCIAL WORK SERVICES (OUTPATIENT)
Age: 65
End: 2025-06-19

## 2025-06-19 VITALS
HEART RATE: 80 BPM | WEIGHT: 188.19 LBS | SYSTOLIC BLOOD PRESSURE: 152 MMHG | DIASTOLIC BLOOD PRESSURE: 89 MMHG | TEMPERATURE: 98 F | OXYGEN SATURATION: 91 % | RESPIRATION RATE: 18 BRPM | BODY MASS INDEX: 32 KG/M2

## 2025-06-19 DIAGNOSIS — C34.91 ADENOCARCINOMA OF RIGHT LUNG (HCC): Primary | ICD-10-CM

## 2025-06-19 PROCEDURE — 99212 OFFICE O/P EST SF 10 MIN: CPT

## 2025-06-19 NOTE — PROGRESS NOTES
SW informed of pt distress score of 6 with physical ( Sleep; Fatigue; Memory or concentration; Loss or change of physical abilities), emotional (Worry or anxiety; Changes in appearance), social ( Relationship with friends or coworkers, and practical concerns of ( Taking care of others; Work; Housing; Treatment decisions).  Pt declines SW at this time per RN.    Payal CONTRERAS, [facility]  [facility] Licensed Clinical

## 2025-06-19 NOTE — CONSULTS
[facility]    PATIENT'S NAME: GARRETT DENIS   RADIATION ONCOLOGIST: Sai Toscano MD   PATIENT ACCOUNT #: [id number] LOCATION: [facility]   MEDICAL RECORD #: [id number] YOB: 1960   CONSULTATION DATE: 06/19/2025       RADIATION ONCOLOGY CONSULTATION    REFERRING PHYSICIAN:  Elías Cole MD.    DIAGNOSIS:  Adenocarcinoma of the right lung, T4N3M0, stage IIIB.    HISTORY OF PRESENT ILLNESS:  The patient is a 65-year-old female with a somewhat protracted history of a right-sided lung cancer.  She was hospitalized in March 2024 for influenza, and during that time a CT scan was done to rule out a PE.  This image, on 03/17/2024, demonstrated a spiculated 3.8 cm right lower lobe pulmonary mass concerning for a primary lung neoplasm.  Multiple other pulmonary nodules, some of which were spiculated and up to 1.5 cm, were concerning for sites of pulmonary metastases.  There were bilateral hilar and subcarinal lymph nodes measuring up to 1.1 cm, suspicious for waqar metastatic disease.  She had a CT scan of the brain at that point which showed no acute intracranial processes.  She saw Dr. Cole, and he recommended a PET scan.  This was ordered but never took place as the patient lost insurance and was lost to followup for quite some time.  She re-presented in June 2025 and saw Dr. Cole at that time.  She finally had a PET scan on 06/02/2025, and this revealed a hypermetabolic right lower lobe mass compatible with a primary malignancy.  This measured 4.3 x 3.3 cm in size with an SUV max of 12.4.  Additional right lung nodules were noted, including a partially cavitary right middle lobe nodule measuring 1.7 x 1.2 cm with an SUV max of 10.1 and a satellite peribronchovascular nodularity measuring 1.4 x 1.8 cm with an SUV of 5.4.  From a waqar standpoint, there is involved disease in the subcarina, right perihilar activity as well as contralateral left perihilar metabolic activity with  an SUV of 6.6.  There was no evidence of any extrathoracic disease.  An MRI was ordered but has not yet been done.  The patient's case was discussed at multidisciplinary conference, and she then underwent a biopsy.  This revealed a poorly-differentiated adenocarcinoma from the right lower lobe mass.  Biopsies from station 7 were positive as well for disease.  She then is referred to Radiation Oncology for a discussion regarding curative therapy.     The patient otherwise feels reasonably well and does not have any significant symptomatic changes of late.  She denies any weight loss, changes in appetite, fevers, chills, shortness of breath, cough, hemoptysis, or other similar problems.    PAST MEDICAL HISTORY:  The patient has a past history of cardiomyopathy, congestive heart failure, COPD, alcoholism, type 2 diabetes, sleep apnea, and lung cancer as per HPI.      PAST SURGICAL HISTORY:  Cervix conization, cardiac defibrillator placement.      MEDICATIONS:  Albuterol, aspirin, atorvastatin, Symbicort, carvedilol, enalapril, furosemide, ipratropium/albuterol, loratadine, montelukast, omeprazole, prednisone, and tiotropium.    ALLERGIES:  No known drug allergies.    FAMILY HISTORY:  Negative for malignancy.    SOCIAL HISTORY:  The patient does have a significant smoking history.  She has a 190 pack-year history of smoking but quit in March 2024.  She also has a history of significant alcoholism but has been sober for 10 years.  She also reports frequent cannabis usage.  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    REVIEW OF SYSTEMS:  A 14-point review of systems is performed.  Pertinent positives and negatives are as per HPI.      PHYSICAL EXAMINATION:    GENERAL:  Physical exam reveals a 65-year-old female who is pleasant, cooperative, and alert, awake, oriented x3.  She is in no acute distress.  She has an ECOG performance score of 1 and a current pain score of  3 due to some eye discomfort.  VITAL SIGNS:  Blood pressure of 152/89, pulse of 80, respiratory rate of 18, and a temperature of 98.0.  Her weight is 188 pounds.  HEENT:  Pupils are equal, round, react to light and accommodation, and the extraocular movements are intact.  The oral cavity is without ulceration or lesions.  NECK:  Supple with no lymphadenopathy.   LUNGS:  Clear to auscultation bilaterally.  HEART:  Regular rate and rhythm with a normal S1, S2 and no audible murmurs.  LYMPHATICS:  There is no supraclavicular, axillary, or inguinal lymphadenopathy.  ABDOMEN:  Soft, nontender, nondistended with normoactive bowel sounds and no hepatosplenomegaly.  EXTREMITIES:  Without clubbing, cyanosis, or edema.  NEUROLOGIC:  Cranial nerves II through XII are grossly intact.  There are no focal deficits.    IMPRESSION:  This is a 65-year-old female with a history of an adenocarcinoma of the right lung.  It appears that she has T4N3 disease, which has been present for over a year.  She had been lost to followup and had no workup due to loss of insurance, but now re-presents for discussion regarding treatment.  An MRI of the brain is pending.    RECOMMENDATIONS:  It is a little difficult to stage this patient given the time interval involved.  It is certainly possible that she does indeed have T4N3 disease and could be treated with curative intent with chemoradiotherapy to be followed by immunotherapy.  However, it is also possible that her other right lung nodularities are actually representative of hematological spread with metastasis.  Still, there is no way to prove this at this point, and I do believe the patient merits curative therapy, pending the results of the brain MRI.  To that extent, I recommend chemoradiotherapy and would give a dose of 6300 cGy in 180 cGy daily fractions.  I would do this with IMRT and IGRT to minimize treatment-related toxicity to the greatest degree possible.  This should be done  alongside chemotherapy, which will be administered by Dr. Cole.  Assuming she has a good response, we then can move on to immunotherapy which can be done afterwards as per the earlier [place] trial.    I had a long talk with the patient and her daughter-in-law regarding this treatment.  I told them that there is certainly no guarantee of success and that she does have advanced disease.  I therefore am optimistic regarding her prognosis but realistic that her treatment may not be successful.  I told her that there are a number of side effects that can occur from the radiation.  Specifically, this includes fatigue and some esophagitis during treatment.  This should not be terribly severe given her imaging, but she may have some dysphagia and heartburn-type symptoms that will progress during the course of therapy.  After treatment is complete, I expect these side effects to resolve within a few weeks.  Esophageal strictures or other significant issues are possible, though not likely.  However, she will be at quite high risk for radiation pneumonitis.  A substantial amount of her right lung will need to be irradiated as part of her treatment.  She therefore will stand to be at meaningful risk for radiation pneumonitis or even other pulmonary damage.  I do not think it is likely that she will require oxygen as a result of this treatment but it is possible, and this was reviewed with the patient and her daughter-in-law.  Following this long and thorough discussion of all the risks and benefits of treatment, the patient indicated that she understood all these issues and does wish to proceed with treatment as I previously dictated.    We therefore will proceed with simulation.  I also will await the brain MRI.  If this were to come back positive, this would change everything detailed above.    Thank you very much for allowing me the opportunity to participate in the care of this patient.  If there should be any questions  regarding the radiotherapy, please feel free to contact me at any time.    Dictated By Sai Toscano MD  d: 06/19/2025 10:34:09  t: 06/19/2025 11:01:44  [facility] [id number]  NAD/    cc: MD Edil Whipple DO Franklin Chang, MD

## 2025-06-19 NOTE — PATIENT INSTRUCTIONS
We will call to schedule you for your CT Simulation for radiation planning.    Please call [phone] if you have any questions or concerns regarding radiation therapy.

## 2025-06-19 NOTE — TELEPHONE ENCOUNTER
6/19/25 Spoke with MRI department [place] while trying to move up Brain MRI to stat this week from 7/22/25.  She reports that it was known Caron Daniels has a defibrillator, medtronic. She reports that since the ICD is inoperable due to the battery needs to be exchanged that [facility] and [facility] is unable to complete this brain MRI. Called Dr. Toledo and Electrophysiology department [facility] and they reported unable to assist with the MRI here at [facility], recommending to complete MRI at a [place] hospital. Patient is not interested in getting the battery exchanged at this time.     Discussed with Dr. Cole on options for MRI at [facility] or [place] or if unable to get this in a timely manner consideration for CT head at [place].  Spoke with Caron and discussed the situation. She was frustrated due to she thought MRI was well aware of the inoperable ICD and was prepared with medtronic. Discussed again that [facility] and [facility] will not complete the MRI at this time and recommends to go to [facility] for best patient care.  Orders faxed to [facility] for stat brain mri, waiting to confirm scheduled.  Questions answered.  JIM Damon

## 2025-06-22 ENCOUNTER — PATIENT MESSAGE (OUTPATIENT)
Dept: INTERNAL MEDICINE CLINIC | Facility: CLINIC | Age: 65
End: 2025-06-22

## 2025-06-23 ENCOUNTER — DOCUMENTATION ONLY (OUTPATIENT)
Age: 65
End: 2025-06-23

## 2025-06-23 ENCOUNTER — OFFICE VISIT (OUTPATIENT)
Age: 65
End: 2025-06-23
Attending: INTERNAL MEDICINE
Payer: COMMERCIAL

## 2025-06-23 VITALS
OXYGEN SATURATION: 94 % | DIASTOLIC BLOOD PRESSURE: 83 MMHG | TEMPERATURE: 98 F | RESPIRATION RATE: 18 BRPM | BODY MASS INDEX: 33 KG/M2 | WEIGHT: 190 LBS | SYSTOLIC BLOOD PRESSURE: 146 MMHG | HEART RATE: 110 BPM

## 2025-06-23 DIAGNOSIS — Z95.810 ICD (IMPLANTABLE CARDIOVERTER-DEFIBRILLATOR) IN PLACE: ICD-10-CM

## 2025-06-23 DIAGNOSIS — C34.91 ADENOCARCINOMA OF RIGHT LUNG (HCC): Primary | ICD-10-CM

## 2025-06-23 NOTE — PROGRESS NOTES
6/23/25 Spoke to [facility] unable to do Brain MRI at [facility], routed to [facility]. Pending to schedule there.  Madelyn Banks APRN

## 2025-06-23 NOTE — PROGRESS NOTES
Cancer Center Progress Note    Patient Name: Caron Daniels   YOB: 1960   Medical Record Number: [id number]   CSN: [id number]   Consulting Physician: Elías Cole MD  Referring Physician(s): Marce Araya MD  Date of Visit: 6/23/2025    Chief Complaint:  Non small cell lung cancer-Adenocarcinoma, [id number], stage IIIB     History of Present Illness:     Caron Daniels is a 65 year old female that was seen today in the Cancer Center for evaluation of the above conditions. Patient has PMH relevant for extensive tobacco use history, nonischemic cardiomyopathy s/p AICD, COPD and prior ETOH use who was hospitalized from 3/17/24 through 3/24/24 for influenza, hypercapnic respiratory failure.  During that hospitalization patient underwent CT chest PE imaging on 3/17/24 which revealed the presence of right lower lobe pulmonary mass concerning for cancer measuring 3.8 x 2.4 cm. She was also noted to have 0.1 cm right hilar nodule, 0.9 cm left hilar nodule as well as 1.1 cm subcarinal lymph node clinically concerning for lymph node disease involvement with lung cancer. There was a 2.1cm groundglass opacity in the right lower lobe which may be infectious/inflammatory or possibly cancer related.  Caron reports feeling improved after the hospitalization.  She has regained the weight she has lost. Patient denies chest pain, dyspnea, nausea, vomiting, abdominal pain, no systemic signs of illness, no reports of voice change, cough or hemoptysis endorsed.    Pt was lost to follow up as she was without insurance coverage. Pt now has insurance coverage since 1/2025.    Interval History:  Patient returns for planned f/u re:NSCLC. She is still awaiting head imaging to r/o brain mets. She has a non functioning ICD in place making it challenging to get MRI brain imaging completed.    She quit smoking in Spring,2024. Pt returns following biopsy of subcarinal node on 5/21 and right lower lobe mass returned w/  NSCLC-adenocarcinoma; CK+, TTF-1+, neg for CK20, p40, synatophysin and CD56. Her PET scan shows PET avidity consistent with N3 disease with bilateral perihilar and mediastinal  lymph nodes involved. Pt reports feeling well w/o focus of pain or systemic signs of illness over the last few months.      Allergies:   Allergies   Allergen Reactions    Augmentin [Amoxicillin-Pot Clavulanate] OTHER (SEE COMMENTS)     Per patient \"It makes me sick\"       Current Medications:   predniSONE 20 MG Oral Tab Take 2 tablets (40 mg total) by mouth daily for 5 days. 10 tablet 0    montelukast 10 MG Oral Tab Take 1 tablet (10 mg total) by mouth nightly. 90 tablet 3    atorvastatin 40 MG Oral Tab Take 1 tablet (40 mg total) by mouth nightly. 90 tablet 3    Budesonide-Formoterol Fumarate (SYMBICORT) 160-4.5 MCG/ACT Inhalation Aerosol Inhale 2 puffs into the lungs 2 (two) times daily. 30.6 g 3    ipratropium-albuterol 0.5-2.5 (3) MG/3ML Inhalation Solution Use 1 vial via nebulizer every 6 hours as needed 180 mL 3    Neomycin-Polymyxin-Dexameth 3.5-13221-8.1 Ophthalmic Suspension       aspirin 325 MG Oral Tab Take 1 tablet (325 mg total) by mouth every 6 (six) hours as needed for Pain.      loteprednol 0.5 % Ophthalmic Suspension 1 drop in the morning and 1 drop at noon and 1 drop in the evening and 1 drop before bedtime.      tiotropium 18 MCG Inhalation Cap Inhale 1 capsule (18 mcg total) into the lungs daily. 30 capsule 5    predniSONE 10 MG Oral Tab 4 tabs/day x 2 days, 3 tabs/day x 2 days, 2 tabs/day x 2 days, 1 tab/day x 2 days. 20 tablet 0    carvedilol 6.25 MG Oral Tab Take 1 tablet (6.25 mg total) by mouth in the morning and 1 tablet (6.25 mg total) in the evening. Take with meals.      albuterol (PROVENTIL HFA) 108 (90 Base) MCG/ACT Inhalation Aero Soln Inhale 1 puff into the lungs every 4 (four) hours as needed for Wheezing. 1 each 5    enalapril 2.5 MG Oral Tab Take 1 tablet (2.5 mg total) by mouth 2 (two) times daily. 180  tablet 3    aspirin 81 MG Oral Chew Tab Chew 1 tablet (81 mg total) by mouth daily. 30 tablet 0    furosemide 40 MG Oral Tab Take 1 tablet (40 mg total) by mouth as needed.      loratadine 10 MG Oral Tab Take 1 tablet (10 mg total) by mouth in the morning.      omeprazole 20 MG Oral Capsule Delayed Release Take 1 capsule (20 mg total) by mouth every morning.      Potassium Chloride ER 10 MEQ Oral Tab CR Take 2 tablets (20 mEq total) by mouth in the morning.         Review of Systems:    Negative, except for pertinent positives noted in the the HPI.     Vital Signs:  /83 (BP Location: Left arm, Patient Position: Sitting, Cuff Size: large)   Pulse 110   Temp 97.9 °F (36.6 °C) (Tympanic)   Resp 18   Wt 86.2 kg (190 lb)   SpO2 94%   BMI 32.61 kg/m²     Physical Examination:    General: Patient is alert and oriented x 3, not in acute distress.  Psych: Friendly, cooperative with appropriate questions and responses  HEENT: EOMs intact. Oropharynx is clear.   Neck: No palpable lymphadenopathy. Neck is supple.  Lymphatics: There is no palpable lymphadenopathy throughout in the cervical, supraclavicular, axillary, or inguinal regions.  Chest: Clear to auscultation. No wheezes or rales.  Heart: Regular rate and rhythm. S1S2 normal.  Abdomen: Soft, non tender with good bowel sounds.    Extremities: No edema or calf tenderness.  Neurological: Grossly intact.     Performance Status:    ECOG 0: Fully active, able to carry on all pre-disease performance without restriction      Labs:    Lab Results   Component Value Date/Time    WBC 9.1 01/16/2025 04:00 PM    RBC 4.46 01/16/2025 04:00 PM    HGB 13.4 01/16/2025 04:00 PM    HCT 40.3 01/16/2025 04:00 PM    MCV 90.4 01/16/2025 04:00 PM    MCH 30.0 01/16/2025 04:00 PM    MCHC 33.3 01/16/2025 04:00 PM    RDW 13.2 01/16/2025 04:00 PM    NEPRELIM 5.97 01/16/2025 04:00 PM    .0 01/16/2025 04:00 PM       Lab Results   Component Value Date/Time    GLU 94 01/16/2025 04:00 PM     BUN 16 01/16/2025 04:00 PM    CREATSERUM 0.90 01/16/2025 04:00 PM    GFRNAA >60 03/14/2017 05:56 AM    CA 9.4 01/16/2025 04:00 PM    ALB 4.7 01/16/2025 04:00 PM     (H) 01/16/2025 04:00 PM    K 3.9 01/16/2025 04:00 PM     01/16/2025 04:00 PM    CO2 32.0 01/16/2025 04:00 PM    ALKPHO 64 01/16/2025 04:00 PM    AST 14 01/16/2025 04:00 PM    ALT 11 01/16/2025 04:00 PM       Imaging:    PET/CT scan 6/3/25  Impression   CONCLUSION:  1. Hypermetabolic right lower lobe mass, compatible primary malignancy.       2. There are additional hypermetabolic satellite nodules which are concerning for intrathoracic metastatic disease.     3. Bilateral perihilar and mediastinal lymph nodes are hypermetabolic, also concerning for waqar metastatic involvement.       4. No definite extrathoracic metastatic disease is identified.     5. Ascending thoracic aortic ectasia.     6. Dilatation of the main pulmonary artery trunk may relate to underlying pulmonary hypertension.       7. Lesser incidental findings as above.       Pathology:    Final Diagnosis: 5/23/25      Right lower lobe, transbronchial biopsy:   Poorly differentiated adenocarcinoma.  See comment.     Comment:  The patient has a history of a spiculated right lower lobe mass measuring 4.2 x 2.3 cm, as well as additional pulmonary nodules in the right upper and lower lobes, per notes.     The right lower lobe lung biopsy shows fragments of alveolated lung parenchyma with an infiltrative tumor consisting of atypical cohesive epithelioid cells with pleomorphic nuclei, prominent nucleoli and eosinophilic cytoplasm. Touch preps performed on the biopsy show similar atypical cells.     Immunohistochemical stains performed on the biopsy demonstrate that the tumor cells are positive for pankeratin (AE1/AE3), CK7, and TTF1, and negative for CK20, p40, synaptophysin, and CD56.     The morphological and immunohistochemical findings support the diagnosis of poorly  differentiated adenocarcinoma, solid type.     This diagnosis was conveyed to Dr. Odonnell at 2:58 p.m. on 5/23/2025.     Final Diagnosis:     A. Lung, right lower lobe; endobronchial ultrasound fine needle aspiration:  Adequacy: Satisfactory for evaluation  General Category: Positive for malignancy  Diagnosis:  Poorly differentiated adenocarcinoma  See comment     B. Lymph node, station 7; endobronchial ultrasound fine needle aspiration:  Adequacy: Satisfactory for evaluation  General Category: Positive for malignancy  Diagnosis:  Metastatic adenocarcinoma    See comment     Electronically signed by Armen Gooden MD on 5/23/2025 at 1506 CDT        Final Diagnosis Comment      The patient has a history of a spiculated right lower lobe mass measuring 4.2 x 2.3 cm, as well as additional pulmonary nodules in the right upper and lower lobes, per notes.     The right lower lobe fine needle aspirate shows rare clusters of atypical cells with hyperchromatic, pleomorphic nuclei and eosinophilic cytoplasm. A cell block performed for the specimen is markedly hypocellular and shows only peripheral blood elements.      The morphological findings in the right lower lobe FNA support the diagnosis of poorly differentiated adenocarcinoma. This diagnosis was made in correlation with further workup performed on concurrent right lower lobe biopsy specimen [id number].     The lymph node station 7 fine needle aspirate shows atypical cells morphologically compatible with those seen in the right lower lobe fine needle aspirate and biopsy specimens, in a background of bronchial epithelial cells. A cell block performed for the specimen shows similar atypical cells. Immunohistochemical stains performed on the cell block demonstrate that the atypical cells are positive for pankeratin (AE1/AE3), CK7, and TTF1, and negative for CK20. These findings support the diagnosis of metastatic adenocarcinoma. No definitive lymph node elements are seen in this  sampling. Clinical correlation is recommended.         Impression:    No diagnosis found.    PMH relevant for extensive tobacco use history, nonischemic cardiomyopathy s/p AICD, COPD and prior ETOH use presents for an evaluation of RLL mass consistent with for lung cancer    Plan:    1.) Right lung mass w/ extensive tobacco use hx, [id number], stage IIIB--Adenocarcinoma    --discussed with pt and her daughter-in-law that she appears to have stage IIIB disease by imaging  --her care was reviewed at tumor board and not deemed inoperable, so she will have to be treated with definitive chemoRT followed by consolidative immunotherapy with durvalumab for 1 yr  --we reviewed side effects of weekly low dose carbo AUC 2+Taxol 50 mg/m2 would be given concomitantly with RT; so pt established care with Dr. Edy Toscano in rad/onc on 6/19/25    --orders placed for MRI brain imaging; however, the pt has a nonfunctioning ICD in place preventing MRI brain imaging. Possibly this can be completed at [facility] in the city; however, if unable to be completed safely, will have to do a CT head t/o brain mets    --once she completes head imaging to r/o brain mets, will be planned for chemo education and consent   --she will be planned for CT simulation soon and I will see her before cycle 1 of carbo/taxol    2.) Pacer w/ ICD in place    --needs to follow with a cardiologist as she mentions the battery for her ICD is dead  --EF in 3/24 was noted to be 50-55%; rec to pt that she follows up with her cardiologist to see if the ICD can be removed    3.) Hx of ETOH use    -- Mild elevation of AST noted from end of 3/2023; reports being sober since 2014, has not had any abdominal imaging re: possible cirrhosis features of the liver  --recent LFT's w/in normal limits    4.) Paraesophageal node    --noted on PET scan imaging at tumor board; will follow this with repeat imaging s/p chemoRT treatment, if still present can be evaluated via EUS    MDM: Moderate  Risk    Elías Cole MD  [facility]  [address]. [facility], [place] [address]

## 2025-06-25 ENCOUNTER — TELEPHONE (OUTPATIENT)
Dept: RADIATION ONCOLOGY | Facility: HOSPITAL | Age: 65
End: 2025-06-25

## 2025-06-25 DIAGNOSIS — C34.91 ADENOCARCINOMA OF RIGHT LUNG (HCC): Primary | ICD-10-CM

## 2025-06-25 NOTE — TELEPHONE ENCOUNTER
RN called [facility] [place] to confirm that pt has inoperable ICD and if there's any recommendations needed before RT. Spoke to [facility] and was advised that pt has not been followed by this cardiology clinic and was advised confirm the device clinic with the pt.    [facility] called back to inform that she spoke with a device clinic RN and the recommendation is to use a magnet over ICD

## 2025-06-26 ENCOUNTER — TELEPHONE (OUTPATIENT)
Age: 65
End: 2025-06-26

## 2025-06-26 NOTE — TELEPHONE ENCOUNTER
Called and spoke with Caron. Told her JIM Olson wanted to check in if Deyvi contacted her to schedule her MRI and she stated no. Told her I will notify JIM Olson and give her a call back with her recommendations. She verbalized understanding and thanked me for the call.

## 2025-06-28 ENCOUNTER — HOSPITAL ENCOUNTER (OUTPATIENT)
Dept: CT IMAGING | Facility: HOSPITAL | Age: 65
Discharge: HOME | End: 2025-06-28
Payer: COMMERCIAL

## 2025-06-28 DIAGNOSIS — C34.91 ADENOCARCINOMA OF RIGHT LUNG (HCC): ICD-10-CM

## 2025-06-28 LAB
CREAT BLD-MCNC: 0.9 MG/DL (ref 0.55–1.02)
EGFRCR SERPLBLD CKD-EPI 2021: 71 ML/MIN/1.73M2 (ref 60–?)

## 2025-06-28 PROCEDURE — 70470 CT HEAD/BRAIN W/O & W/DYE: CPT

## 2025-06-28 PROCEDURE — 82565 ASSAY OF CREATININE: CPT

## 2025-06-30 ENCOUNTER — TELEPHONE (OUTPATIENT)
Age: 65
End: 2025-06-30

## 2025-06-30 NOTE — TELEPHONE ENCOUNTER
6/30/25, spoke with Caron, discussed ct brain 6/28/25 with no acute abnormality and no brain metastasis. Dr. Coel and Dr. Edy Toscano both reviewed head ct and agreed to cancel pending brain mri at [facility]. Radiation oncology will follow up with her and we will plan chemo teach to try and get on same day as simulation or other appt.  JIM Damon

## 2025-06-30 NOTE — ED QUICK NOTES
Per admitting doctor- patient to be placed on BIPAP. Respiratory made aware. Patient transported to room in stable conditions along with respiratory. Family member aware of plan of care.    Unable to reach patient for call back after patient's follow up appointment with Dr. Tucker. Left voicemail with call back number for patient to call if needed.

## 2025-07-01 ENCOUNTER — TELEPHONE (OUTPATIENT)
Dept: INTERNAL MEDICINE CLINIC | Facility: CLINIC | Age: 65
End: 2025-07-01

## 2025-07-01 NOTE — TELEPHONE ENCOUNTER
Patient notified of Dr Ector giron via [employer] message  _____________________   Traci Baumann    This referral has been Denied per [facility] POS as Out of Network.  Please advise the patient to contact [facility] to obtain the name of an In Network Provider.  ______________________  Referral Denied by [facility] POS plan as out-of-network   To Provider Specialty To Provider To Location/Place of Service To Department   OPHTHALMOLOGY Cornell Barney MD       Patient notified by [employer] Message.

## 2025-07-02 ENCOUNTER — APPOINTMENT (OUTPATIENT)
Dept: RADIATION ONCOLOGY | Facility: HOSPITAL | Age: 65
End: 2025-07-02
Attending: INTERNAL MEDICINE
Payer: COMMERCIAL

## 2025-07-02 ENCOUNTER — NURSE ONLY (OUTPATIENT)
Dept: PULMONOLOGY | Facility: CLINIC | Age: 65
End: 2025-07-02

## 2025-07-02 VITALS
WEIGHT: 190 LBS | HEIGHT: 64 IN | SYSTOLIC BLOOD PRESSURE: 139 MMHG | BODY MASS INDEX: 32.44 KG/M2 | OXYGEN SATURATION: 91 % | HEART RATE: 66 BPM | DIASTOLIC BLOOD PRESSURE: 68 MMHG

## 2025-07-02 DIAGNOSIS — C34.90 MALIGNANT NEOPLASM OF LUNG, UNSPECIFIED LATERALITY, UNSPECIFIED PART OF LUNG (HCC): ICD-10-CM

## 2025-07-02 DIAGNOSIS — J44.9 CHRONIC OBSTRUCTIVE PULMONARY DISEASE, UNSPECIFIED COPD TYPE (HCC): Primary | ICD-10-CM

## 2025-07-02 PROCEDURE — 77399 UNLISTED PX MED RADJ PHYSICS: CPT | Performed by: RADIOLOGY

## 2025-07-02 PROCEDURE — 77334 RADIATION TREATMENT AID(S): CPT | Performed by: RADIOLOGY

## 2025-07-02 PROCEDURE — 77470 SPECIAL RADIATION TREATMENT: CPT | Performed by: RADIOLOGY

## 2025-07-02 PROCEDURE — 3008F BODY MASS INDEX DOCD: CPT | Performed by: INTERNAL MEDICINE

## 2025-07-02 PROCEDURE — 94761 N-INVAS EAR/PLS OXIMETRY MLT: CPT | Performed by: INTERNAL MEDICINE

## 2025-07-02 PROCEDURE — 3078F DIAST BP <80 MM HG: CPT | Performed by: INTERNAL MEDICINE

## 2025-07-02 PROCEDURE — 3075F SYST BP GE 130 - 139MM HG: CPT | Performed by: INTERNAL MEDICINE

## 2025-07-02 NOTE — PROGRESS NOTES
Nurse visit started at 910 and ended at 937.     6 minute walk test completed. Patient desaturated to 85% on Room Air. See ambulatory Oximetry flowsheet.     Pt is refusing Oxygen at this time she was informed to notify us before 30 days if she would like to proceed with oxygen. Patient verbalized understanding.

## 2025-07-03 DIAGNOSIS — J44.9 CHRONIC OBSTRUCTIVE PULMONARY DISEASE, UNSPECIFIED COPD TYPE (HCC): ICD-10-CM

## 2025-07-07 ENCOUNTER — PATIENT MESSAGE (OUTPATIENT)
Dept: PULMONOLOGY | Facility: CLINIC | Age: 65
End: 2025-07-07

## 2025-07-07 NOTE — TELEPHONE ENCOUNTER
Spoke with patient. Patient states she is interested in getting oxygen now. Informed patient RN visit needed to complete Oxygen walk. RN visit scheduled for tomorrow 7/8/25 at 9:15AM. Patient verbalized understanding.

## 2025-07-08 ENCOUNTER — NURSE ONLY (OUTPATIENT)
Dept: PULMONOLOGY | Facility: CLINIC | Age: 65
End: 2025-07-08
Payer: COMMERCIAL

## 2025-07-08 VITALS
DIASTOLIC BLOOD PRESSURE: 87 MMHG | OXYGEN SATURATION: 94 % | HEIGHT: 64 IN | HEART RATE: 77 BPM | SYSTOLIC BLOOD PRESSURE: 149 MMHG | WEIGHT: 190 LBS | BODY MASS INDEX: 32.44 KG/M2 | RESPIRATION RATE: 20 BRPM

## 2025-07-08 DIAGNOSIS — J44.1 COPD EXACERBATION (HCC): ICD-10-CM

## 2025-07-08 DIAGNOSIS — J44.9 CHRONIC OBSTRUCTIVE PULMONARY DISEASE, UNSPECIFIED COPD TYPE (HCC): Primary | ICD-10-CM

## 2025-07-08 PROCEDURE — 77300 RADIATION THERAPY DOSE PLAN: CPT | Performed by: RADIOLOGY

## 2025-07-08 PROCEDURE — 3008F BODY MASS INDEX DOCD: CPT | Performed by: INTERNAL MEDICINE

## 2025-07-08 PROCEDURE — 94761 N-INVAS EAR/PLS OXIMETRY MLT: CPT | Performed by: INTERNAL MEDICINE

## 2025-07-08 PROCEDURE — 77301 RADIOTHERAPY DOSE PLAN IMRT: CPT | Performed by: RADIOLOGY

## 2025-07-08 PROCEDURE — 3077F SYST BP >= 140 MM HG: CPT | Performed by: INTERNAL MEDICINE

## 2025-07-08 PROCEDURE — 77338 DESIGN MLC DEVICE FOR IMRT: CPT | Performed by: RADIOLOGY

## 2025-07-08 PROCEDURE — 77293 RESPIRATOR MOTION MGMT SIMUL: CPT | Performed by: RADIOLOGY

## 2025-07-08 PROCEDURE — 77370 RADIATION PHYSICS CONSULT: CPT | Performed by: RADIOLOGY

## 2025-07-08 PROCEDURE — 3079F DIAST BP 80-89 MM HG: CPT | Performed by: INTERNAL MEDICINE

## 2025-07-08 RX ORDER — ALBUTEROL SULFATE 90 UG/1
1 INHALANT RESPIRATORY (INHALATION) EVERY 4 HOURS PRN
Qty: 1 EACH | Refills: 5 | Status: SHIPPED | OUTPATIENT
Start: 2025-07-08

## 2025-07-08 NOTE — PATIENT INSTRUCTIONS
[facility] () [phone] (fx) [phone]  [facility] () [phone] (fx) [phone]  [facility] () [phone] (fx) [phone]

## 2025-07-08 NOTE — PROGRESS NOTES
Nurse visit started at 9:15 and ended at 9:50.     6 minute walk test completed. Patient desaturated to 86% on Room Air. See ambulatory Oximetry flowsheet.         Patient agreed to supplemental oxygen.    Order pended for supplemental oxygen 2L.

## 2025-07-09 ENCOUNTER — TELEPHONE (OUTPATIENT)
Dept: CASE MANAGEMENT | Age: 65
End: 2025-07-09

## 2025-07-09 DIAGNOSIS — C34.90 MALIGNANT NEOPLASM OF BRONCHUS AND LUNG (HCC): Primary | ICD-10-CM

## 2025-07-09 DIAGNOSIS — C34.91 ADENOCARCINOMA OF RIGHT LUNG (HCC): ICD-10-CM

## 2025-07-09 DIAGNOSIS — C34.91 PRIMARY LUNG CANCER WITH METASTASIS FROM LUNG TO OTHER SITE, RIGHT (HCC): ICD-10-CM

## 2025-07-09 NOTE — TELEPHONE ENCOUNTER
Dr Calderon, *    Cancer Center requesting STAT referral to Dr Edy Toscano.     Pended referral please review diagnosis and sign off if you agree.    Thank you.  Kathleen Charles  [facility]

## 2025-07-10 ENCOUNTER — TELEPHONE (OUTPATIENT)
Age: 65
End: 2025-07-10

## 2025-07-10 ENCOUNTER — OFFICE VISIT (OUTPATIENT)
Age: 65
End: 2025-07-10
Attending: INTERNAL MEDICINE
Payer: COMMERCIAL

## 2025-07-10 ENCOUNTER — TELEPHONE (OUTPATIENT)
Dept: CASE MANAGEMENT | Age: 65
End: 2025-07-10

## 2025-07-10 DIAGNOSIS — C34.91 ADENOCARCINOMA OF RIGHT LUNG (HCC): Primary | ICD-10-CM

## 2025-07-10 NOTE — TELEPHONE ENCOUNTER
To Kylie,     Dr. Calderon entered a new referral with in network MD. Are you able to please assist?

## 2025-07-10 NOTE — TELEPHONE ENCOUNTER
Called patient to book lab and MD appointment prior to starting treatment. Pt requesting a my chart message with details. Message sent to patient.

## 2025-07-10 NOTE — PATIENT INSTRUCTIONS
Medication Education Record: IV Therapy    Learner:  Patient and Family Member    Barriers / Limitations:  None    Psychosocial Assessment:  patient psychosocial response appropriate    Diagnosis:   lung cancer, NSCLC    IV Cancer Treatment Name(s): carboplatin, paclitaxel  IV Cancer Treatment Frequency every week with radiation    Number of cycles planned 5 weeks  Plan for appointments and lab testing 8/1/25  Verified Consent to Chemotherapy/Biotherapy Cancer Treatment form signed by patient and provider:  Yes    Confirm patient informs his/her Cancer Care team of any treatment received in a setting other than at the [facility] (such as inpatient or outpatient at another hospital or clinic, locally or out of state) so that this medical information can accurately be reflected in his/her medical record.  This vital information will provide an accurate picture for the physician prescribing the current cancer treatment.Yes  Cancer Treatment Side Effects (refer to Chemo Care Handouts for further information):  Allergic reactions  Blood Clots  Constipation  Diarrhea  Eye disorders  Fatigue  Fever  Forgetfulness  Hair loss  Heart effects  Kidney / Bladder effects  Liver effects  Loss of appetite  Low red blood cell count / Anemia  Low White Blood Cell Count/Risk of infection  Low Platelet Count/Risk of Bleeding  Lung Effects  Mouth or Throat Sores  Muscle / Bone Effects  Nausea / Vomiting  Nerve Effects  Secondary Malignancies  Skin Effects  Taste Changes    IV administration risks:  Potential leaking of drug outside of vein during administration   Signs/symptoms include redness, swelling, pain, burning at the site of administration  Notify Infusion Nurse immediately if any of these symptoms occur during or after the infusion  Allergic reaction: there is a chance for allergic reaction with some medications.  If your prescribed therapy has a higher risk for this, steps will be taken to prevent and  minimize this from occurring.    Recommended Anti-nausea medications (as directed by your provider):  Prochloperazine (Compazine) 10 mg every 6 hours and Ondansetron (Zofran) 8 mg every 8 hours  Take as needed    Other drug specific:   Steroid prep None, IV ondansetron, IV pepsid (famotidine) and diphenhydramine     Helpful hints during cancer treatment:    Diet:  Avoid greasy or spicy foods on days surrounding treatment  Eat small frequent meals per day (6-7 meals) rather than 3 large meals  Choose high calorie/high protein foods (chicken, hard cooked eggs, peanut butter, cheese)  If nauseated, try dry foods, such as toast, crackers or pretzels; light or bland foods, such as applesauce or oatmeal.    Fluid intake:  Drink 8-10 cups of liquid a day and take a water bottle wherever you go.  Any fluid is acceptable, but caffeinated products do not count towards your intake and should be limited to 1-2 drinks/day.    Physical Activity    If your doctor approves, be as physically active as you can, but start out slowly, and increase your activity over time as you feel stronger.  Listen to your body and rest when you need to.  Do what you can when you feel up to it.      Oral Care  Keep your mouth clean.  Rinse your mouth before and after meals with plain water or with a mild mouth rinse (made with 1 quart water, 1 teaspoon salt, and 1 teaspoon baking soda, shake before using)   Avoid commercial mouthwashes that contain alcohol, alcoholic or acidic drinks or tobacco  An acceptable mouthwash is Biotene®   If soreness or sores develop, contact the office.    Diarrhea or Constipation    Imodium A-D use for diarrhea:  Take 2 tabs (4mg) at the first sign of diarrhea; then take 1 tab (2mg) every 2 hours until you have had no diarrhea for at least 12 hours; during the night take 2 tabs (4mg) every 4 hours as needed.  Maximum of 8 tablets in 24 hours.   Colace:  Miralax:  Senna-S:  Dulcolax Tablets:  Laxatives may cause abdominal  cramping. Call your physician if you do not have a bowel movement in 3 days.    If you have persistent diarrhea or constipation, please contact the triage nurse for further instructions.  Skin Care  Avoid direct sunlight.  Wear a broad-spectrum sunscreen with an SPF of 30 or higher on any skin exposed to the sun.  Re-apply every 2 hours if in the sun and after bathing or sweating.  For dry skin, use an alcohol-free lotion twice per day, especially after baths.  If scalp hair loss has occurred, protect the scalp from the sun by wearing a hat and use sunscreen.  Apply alcohol-free moisturizer as needed.    When to call the doctor:  Fever of 100.4 or greater or shaking chills  Nausea/vomiting not controlled with anti-nausea medications: Unable to drink for 24 hours or have signs of dehydration: tiredness, thirst, dry mouth, dark and decreased amount of urine  Diarrhea - not controlled with Imodium AD or more than 6 episodes in 24 hours  Constipation -no bowel movement x 3 days, no response to stool softeners or laxatives  Mouth sores, sore throat or blisters on the lips affecting oral intake  Difficulty breathing, chest pain, or new cough  Excessive tiredness or weakness, confusion or loss of balance  New rash  Tingling or burning, redness, swelling of the palms of hands or soles of feet  Sudden new unexpected symptom -such as change in vision, swelling in arm or leg  Increase in numbness and tingling of hands or feet  Unusual bleeding (nose bleeds, blood in urine, stool or phlegm)  Pain with urination  Persistent mood changes, depression, nervousness, difficulty sleeping   Pain, redness, swelling or blistering at the IV site  If you go to Emergency Room for any reason or seek medical attention elsewhere  If you should need to cancel or reschedule any visit, it is important that you contact the office    What Phone Number to Call:   [facility] [phone] / Triage Nurse  The triage nurse will call you the day  after your first treatment (or Monday following first Friday treatments) to check on you and answer any questions. You may call this number 24/7 to reach the on-call physician.   Teaching Materials Provided:   Chemo Care chemotherapy information sheets , List names of drugs providedcarboplatin, paclitaxel , and Safety and Handling Sheet     Additional Patient Resources:    [facility] [phone]  Integrative Medicine    Please refer to the following link if you are interested in additional information about chemotherapy for yourself or family members: [email or web address]      Safety and Handling of Chemotherapy  While you or your family member is receiving chemotherapy, whether in the clinic or at home, the following precautions must be taken to lessen any exposure to the medication.     Home Intravenous (IV) Medication:  Make sure the connections of your IV tubing are tight and not leaking.  You should do this twice a day.    If the IV connection is leaking:  Put on disposable gloves  Stop the pump by clamping the tubing and turning it off.  Cover the connection with a paper towel and a plastic bag.  Refer to the Chemotherapy Spill Kit given to you.  Call the infusion pump company for further instructions.  Contact your doctor’s office with further questions if needed.  Handling Body Waste:   Caregivers must wear gloves if exposed to the patient's blood, urine, stool or vomit. Dispose of the used gloves after each use and wash hands with soap and water.  Any sheets or clothes soiled with the bodily fluids should be machine washed twice in hot water with regular laundry detergent. Do not wash soiled garments with hands. If the soiled garments cannot be washed right away, place them in a sealed plastic bag until they can be washed.  Absorbable undergarments, or any other items contaminated with chemotherapy, should be placed in a sealed plastic bag for disposal, separate from  other trash.  Toilets should be flushed twice with the lid closed while taking this medication and for 48 hours after the last dose.  Wash your hands after using the toilet.  Wash any skin area that has come into contact with urine or stool.  Safety for my family and friends:  Due to safety concerns and the nature of this treatment environment, children are not permitted in the infusion center.  Please make appropriate arrangements.   Hugging and kissing is safe for you and your partner or family members.  You can visit, sit with, hug and kiss the children in your life.    You can be around pregnant women, though (if possible) they should not clean up any of your body fluids after you have treatment.   Sexual activity is safe while throughout treatment.  It is possible that traces of the oral chemotherapy may be present in vaginal fluid or semen for 48 hours after taking.  A condom should be used during this time.  Effective birth control should be used throughout treatment to prevent pregnancy while on these medications and for several months or years after therapy.  Chemotherapy can have harmful side effects to the fetus, especially in the first trimester.  In addition, menstrual cycles can become irregular during and after treatment, so you may not know if you are at a time in your cycle when you could become pregnant or if you are actually pregnant.

## 2025-07-10 NOTE — TELEPHONE ENCOUNTER
To Dr. Calderon,     Please see below. Blank referral pended for your choice of the below in network Physicians

## 2025-07-10 NOTE — TELEPHONE ENCOUNTER
Hello    We have received a request for patient to see Dr. Sai Toscano. This provider is out of network. The request will be closed.     Please generate a new request with a in network provider     In network providers    Dr. Valeriy Duran    Please advise     Thank you,  Coin   Referral specialist

## 2025-07-14 NOTE — TELEPHONE ENCOUNTER
[place] message sent to pt to inform that Rad Onc referral to Dr Arteaga (12 visits) was authorized.

## 2025-07-14 NOTE — TELEPHONE ENCOUNTER
Pt called the office apparently upset (per PSR) that referral for Dr Edy Toscano as radiation oncologist was denied by insurance and the Dr Arteaga/ [facility] was approved.  Pt does not want to seek care from Dr Arteaga/ travel to [place] for care.    [facility] care Dept PH# given to patient to call and discuss OON status of Edy Toscano w/ the Dept directly.    [employer] message also sent to pt to explain the OON message received by the office today regarding Dr Toscano referral denial and approval of Dr Arteaga referral.

## 2025-07-16 ENCOUNTER — TELEPHONE (OUTPATIENT)
Dept: INTERNAL MEDICINE CLINIC | Facility: CLINIC | Age: 65
End: 2025-07-16

## 2025-07-16 DIAGNOSIS — C34.91 CARCINOMA OF RIGHT LUNG (HCC): Primary | ICD-10-CM

## 2025-07-16 DIAGNOSIS — C34.91 ADENOCARCINOMA OF LUNG, RIGHT (HCC): ICD-10-CM

## 2025-07-16 PROBLEM — Z51.11 ENCOUNTER FOR ANTINEOPLASTIC CHEMOTHERAPY: Status: ACTIVE | Noted: 2025-07-16

## 2025-07-16 RX ORDER — ONDANSETRON 8 MG/1
8 TABLET, FILM COATED ORAL EVERY 8 HOURS PRN
Qty: 30 TABLET | Refills: 3 | Status: SHIPPED | OUTPATIENT
Start: 2025-07-16

## 2025-07-16 RX ORDER — PROCHLORPERAZINE MALEATE 10 MG
10 TABLET ORAL EVERY 6 HOURS PRN
Qty: 30 TABLET | Refills: 3 | Status: SHIPPED | OUTPATIENT
Start: 2025-07-16

## 2025-07-16 NOTE — PROGRESS NOTES
Medication Education Record: IV Therapy    Learner:  Patient and Family Member  Summary: Patient reports unable to take off next week to start earlier due to work. She has oxygen NC 2 L today. Otherwise no WOB, appears comfortable, no new complaints.  Planning to start Chemo RT. CT head completed and stable, unable to complete brain MRI due to [place] would not complete the MRI with the patient having a medtronic ICD inoperable. We were waiting for her to get cleared at [facility] but it was taking weeks so completed the head CT.     Barriers / Limitations:  None    Psychosocial Assessment:  patient psychosocial response appropriate    Diagnosis:   lung cancer, NSCLC    IV Cancer Treatment Name(s): carboplatin, paclitaxel  IV Cancer Treatment Frequency every week with radiation    Number of cycles planned 5-7weeks, to match Radiation  Plan for appointments and lab testing 8/1/25, 7/17/25 labs and 7/18/25 video visit Dr. Cole pre chemo  Verified Consent to Chemotherapy/Biotherapy Cancer Treatment form signed by patient and provider:  Yes    Confirm patient informs his/her Cancer Care team of any treatment received in a setting other than at the [facility] (such as inpatient or outpatient at another hospital or clinic, locally or out of state) so that this medical information can accurately be reflected in his/her medical record.  This vital information will provide an accurate picture for the physician prescribing the current cancer treatment.Yes  Cancer Treatment Side Effects (refer to Chemo Care Handouts for further information):  Allergic reactions  Blood Clots  Constipation  Diarrhea  Eye disorders  Fatigue  Fever  Forgetfulness  Hair loss  Heart effects  Kidney / Bladder effects  Liver effects  Loss of appetite  Low red blood cell count / Anemia  Low White Blood Cell Count/Risk of infection  Low Platelet Count/Risk of Bleeding  Lung Effects  Mouth or Throat Sores  Muscle / Bone  Effects  Nausea / Vomiting  Nerve Effects  Secondary Malignancies  Skin Effects  Taste Changes    IV administration risks:  Potential leaking of drug outside of vein during administration   Signs/symptoms include redness, swelling, pain, burning at the site of administration  Notify Infusion Nurse immediately if any of these symptoms occur during or after the infusion  Allergic reaction: there is a chance for allergic reaction with some medications.  If your prescribed therapy has a higher risk for this, steps will be taken to prevent and minimize this from occurring.    Recommended Anti-nausea medications (as directed by your provider):  Prochloperazine (Compazine) 10 mg every 6 hours and Ondansetron (Zofran) 8 mg every 8 hours  Take as needed    Other drug specific:   Steroid prep None, IV ondansetron, IV pepsid (famotidine) and diphenhydramine     Helpful hints during cancer treatment:    Diet:  Avoid greasy or spicy foods on days surrounding treatment  Eat small frequent meals per day (6-7 meals) rather than 3 large meals  Choose high calorie/high protein foods (chicken, hard cooked eggs, peanut butter, cheese)  If nauseated, try dry foods, such as toast, crackers or pretzels; light or bland foods, such as applesauce or oatmeal.    Fluid intake:  Drink 8-10 cups of liquid a day and take a water bottle wherever you go.  Any fluid is acceptable, but caffeinated products do not count towards your intake and should be limited to 1-2 drinks/day.    Physical Activity    If your doctor approves, be as physically active as you can, but start out slowly, and increase your activity over time as you feel stronger.  Listen to your body and rest when you need to.  Do what you can when you feel up to it.      Oral Care  Keep your mouth clean.  Rinse your mouth before and after meals with plain water or with a mild mouth rinse (made with 1 quart water, 1 teaspoon salt, and 1 teaspoon baking soda, shake before using)   Avoid  commercial mouthwashes that contain alcohol, alcoholic or acidic drinks or tobacco  An acceptable mouthwash is Biotene®   If soreness or sores develop, contact the office.    Diarrhea or Constipation    Imodium A-D use for diarrhea:  Take 2 tabs (4mg) at the first sign of diarrhea; then take 1 tab (2mg) every 2 hours until you have had no diarrhea for at least 12 hours; during the night take 2 tabs (4mg) every 4 hours as needed.  Maximum of 8 tablets in 24 hours.   Colace:  Miralax:  Senna-S:  Dulcolax Tablets:  Laxatives may cause abdominal cramping. Call your physician if you do not have a bowel movement in 3 days.    If you have persistent diarrhea or constipation, please contact the triage nurse for further instructions.  Skin Care  Avoid direct sunlight.  Wear a broad-spectrum sunscreen with an SPF of 30 or higher on any skin exposed to the sun.  Re-apply every 2 hours if in the sun and after bathing or sweating.  For dry skin, use an alcohol-free lotion twice per day, especially after baths.  If scalp hair loss has occurred, protect the scalp from the sun by wearing a hat and use sunscreen.  Apply alcohol-free moisturizer as needed.    When to call the doctor:  Fever of 100.4 or greater or shaking chills  Nausea/vomiting not controlled with anti-nausea medications: Unable to drink for 24 hours or have signs of dehydration: tiredness, thirst, dry mouth, dark and decreased amount of urine  Diarrhea - not controlled with Imodium AD or more than 6 episodes in 24 hours  Constipation -no bowel movement x 3 days, no response to stool softeners or laxatives  Mouth sores, sore throat or blisters on the lips affecting oral intake  Difficulty breathing, chest pain, or new cough  Excessive tiredness or weakness, confusion or loss of balance  New rash  Tingling or burning, redness, swelling of the palms of hands or soles of feet  Sudden new unexpected symptom -such as change in vision, swelling in arm or leg  Increase in  numbness and tingling of hands or feet  Unusual bleeding (nose bleeds, blood in urine, stool or phlegm)  Pain with urination  Persistent mood changes, depression, nervousness, difficulty sleeping   Pain, redness, swelling or blistering at the IV site  If you go to Emergency Room for any reason or seek medical attention elsewhere  If you should need to cancel or reschedule any visit, it is important that you contact the office    What Phone Number to Call:   [facility] [phone] / Triage Nurse  The triage nurse will call you the day after your first treatment (or Monday following first Friday treatments) to check on you and answer any questions. You may call this number 24/7 to reach the on-call physician.   Teaching Materials Provided:   Chemo Care chemotherapy information sheets , List names of drugs providedcarboplatin, paclitaxel , and Safety and Handling Sheet     Additional Patient Resources:    [facility] [phone]  Integrative Medicine    Please refer to the following link if you are interested in additional information about chemotherapy for yourself or family members: [email or web address]      Safety and Handling of Chemotherapy  While you or your family member is receiving chemotherapy, whether in the clinic or at home, the following precautions must be taken to lessen any exposure to the medication.     Home Intravenous (IV) Medication:  Make sure the connections of your IV tubing are tight and not leaking.  You should do this twice a day.    If the IV connection is leaking:  Put on disposable gloves  Stop the pump by clamping the tubing and turning it off.  Cover the connection with a paper towel and a plastic bag.  Refer to the Chemotherapy Spill Kit given to you.  Call the infusion pump company for further instructions.  Contact your doctor’s office with further questions if needed.  Handling Body Waste:   Caregivers must wear gloves if exposed to the  patient's blood, urine, stool or vomit. Dispose of the used gloves after each use and wash hands with soap and water.  Any sheets or clothes soiled with the bodily fluids should be machine washed twice in hot water with regular laundry detergent. Do not wash soiled garments with hands. If the soiled garments cannot be washed right away, place them in a sealed plastic bag until they can be washed.  Absorbable undergarments, or any other items contaminated with chemotherapy, should be placed in a sealed plastic bag for disposal, separate from other trash.  Toilets should be flushed twice with the lid closed while taking this medication and for 48 hours after the last dose.  Wash your hands after using the toilet.  Wash any skin area that has come into contact with urine or stool.  Safety for my family and friends:  Due to safety concerns and the nature of this treatment environment, children are not permitted in the infusion center.  Please make appropriate arrangements.   Hugging and kissing is safe for you and your partner or family members.  You can visit, sit with, hug and kiss the children in your life.    You can be around pregnant women, though (if possible) they should not clean up any of your body fluids after you have treatment.   Sexual activity is safe while throughout treatment.  It is possible that traces of the oral chemotherapy may be present in vaginal fluid or semen for 48 hours after taking.  A condom should be used during this time.  Effective birth control should be used throughout treatment to prevent pregnancy while on these medications and for several months or years after therapy.  Chemotherapy can have harmful side effects to the fetus, especially in the first trimester.  In addition, menstrual cycles can become irregular during and after treatment, so you may not know if you are at a time in your cycle when you could become pregnant or if you are actually pregnant.    Cancer treatment  education, including treatment plan, supportive medications, and post-treatment care was provided to the patient.  The patient/support person  was attentive during education, verbalized understanding, all questions were answered and patient was instructed to call with further questions.     Reinforcement of education is needed at next visit? Yes  Include language provided and  information (if applicable).    Psychosocial concerns or referrals made none  40 minutes appointment with at minimum 50% time spent counseling/coordinating care   JIM Damon

## 2025-07-16 NOTE — TELEPHONE ENCOUNTER
Megan left a voicemail.    This patient needs a referral, as she has [employer] insurance.  For Dr. Sai Toscano's office.    Megan said she tried to work with insurance, but is getting the run a round.    She is asking if she can please get a call back from a nurse who works on referrals.    Her phone number is [phone].

## 2025-07-16 NOTE — TELEPHONE ENCOUNTER
Extended call w/ Rad/ Surg Onc practice staff/ (Megan Cutler) re: challenges on obtaining referral auth for Dr Edy Toscano at the [place] office location/ address due to a [employer]BS system err w/ wrong practice address in system.  Despite the fact that the [facility] is authorized as in-network for coverage of technical facility radiation svs, pt would be liable for a portion of Rad Onc professional fees not fully covered by the POS Plan.    Explained PCP office has done all possible we can do-- have routed telephonic, Epic and Park[employer] messages to [facility]/ [employer]BS lead to explain circumstance and request assistance for auth approval of Dr Candelario.  Placed second referral to Dr Candelario today, Ref# [id number] today.

## 2025-07-17 ENCOUNTER — TELEPHONE (OUTPATIENT)
Age: 65
End: 2025-07-17

## 2025-07-17 ENCOUNTER — LAB ENCOUNTER (OUTPATIENT)
Dept: LAB | Facility: HOSPITAL | Age: 65
End: 2025-07-17
Attending: INTERNAL MEDICINE
Payer: COMMERCIAL

## 2025-07-17 DIAGNOSIS — C34.91 ADENOCARCINOMA OF RIGHT LUNG (HCC): ICD-10-CM

## 2025-07-17 LAB
ALBUMIN SERPL-MCNC: 4.6 G/DL (ref 3.2–4.8)
ALBUMIN/GLOB SERPL: 1.8 {RATIO} (ref 1–2)
ALP LIVER SERPL-CCNC: 64 U/L (ref 50–130)
ALT SERPL-CCNC: 9 U/L (ref 10–49)
ANION GAP SERPL CALC-SCNC: 9 MMOL/L (ref 0–18)
AST SERPL-CCNC: 13 U/L (ref ?–34)
BASOPHILS # BLD AUTO: 0.05 X10(3) UL (ref 0–0.2)
BASOPHILS NFR BLD AUTO: 0.8 %
BILIRUB SERPL-MCNC: 0.2 MG/DL (ref 0.2–1.1)
BUN BLD-MCNC: 11 MG/DL (ref 9–23)
BUN/CREAT SERPL: 12.4 (ref 10–20)
CALCIUM BLD-MCNC: 9.1 MG/DL (ref 8.7–10.4)
CHLORIDE SERPL-SCNC: 102 MMOL/L (ref 98–112)
CO2 SERPL-SCNC: 30 MMOL/L (ref 21–32)
CREAT BLD-MCNC: 0.89 MG/DL (ref 0.55–1.02)
DEPRECATED RDW RBC AUTO: 44.4 FL (ref 35.1–46.3)
EGFRCR SERPLBLD CKD-EPI 2021: 72 ML/MIN/1.73M2 (ref 60–?)
EOSINOPHIL # BLD AUTO: 0.3 X10(3) UL (ref 0–0.7)
EOSINOPHIL NFR BLD AUTO: 4.6 %
ERYTHROCYTE [DISTWIDTH] IN BLOOD BY AUTOMATED COUNT: 13.6 % (ref 11–15)
FASTING STATUS PATIENT QL REPORTED: YES
GLOBULIN PLAS-MCNC: 2.5 G/DL (ref 2–3.5)
GLUCOSE BLD-MCNC: 83 MG/DL (ref 70–99)
HCT VFR BLD AUTO: 40.5 % (ref 35–48)
HGB BLD-MCNC: 12.9 G/DL (ref 12–16)
IMM GRANULOCYTES # BLD AUTO: 0.02 X10(3) UL (ref 0–1)
IMM GRANULOCYTES NFR BLD: 0.3 %
LYMPHOCYTES # BLD AUTO: 1.61 X10(3) UL (ref 1–4)
LYMPHOCYTES NFR BLD AUTO: 24.5 %
MCH RBC QN AUTO: 28.1 PG (ref 26–34)
MCHC RBC AUTO-ENTMCNC: 31.9 G/DL (ref 31–37)
MCV RBC AUTO: 88.2 FL (ref 80–100)
MONOCYTES # BLD AUTO: 0.93 X10(3) UL (ref 0.1–1)
MONOCYTES NFR BLD AUTO: 14.2 %
NEUTROPHILS # BLD AUTO: 3.66 X10 (3) UL (ref 1.5–7.7)
NEUTROPHILS # BLD AUTO: 3.66 X10(3) UL (ref 1.5–7.7)
NEUTROPHILS NFR BLD AUTO: 55.6 %
OSMOLALITY SERPL CALC.SUM OF ELEC: 291 MOSM/KG (ref 275–295)
PLATELET # BLD AUTO: 189 10(3)UL (ref 150–450)
POTASSIUM SERPL-SCNC: 3.9 MMOL/L (ref 3.5–5.1)
PROT SERPL-MCNC: 7.1 G/DL (ref 5.7–8.2)
RBC # BLD AUTO: 4.59 X10(6)UL (ref 3.8–5.3)
SODIUM SERPL-SCNC: 141 MMOL/L (ref 136–145)
WBC # BLD AUTO: 6.6 X10(3) UL (ref 4–11)

## 2025-07-17 PROCEDURE — 85025 COMPLETE CBC W/AUTO DIFF WBC: CPT

## 2025-07-17 PROCEDURE — 80053 COMPREHEN METABOLIC PANEL: CPT

## 2025-07-17 PROCEDURE — 36415 COLL VENOUS BLD VENIPUNCTURE: CPT

## 2025-07-17 NOTE — TELEPHONE ENCOUNTER
Called and spoke with Caron to remind her to get her labs done today before her virtual follow up appointment with Dr. Cole tomorrow, 7/18 at 2:20 pm. She verbalized understanding and will be going to the lab tonight after she leaves her work office. Reminded her she does not have to be fasting. She verbalized understanding and thanked me for the call.

## 2025-07-18 ENCOUNTER — TELEMEDICINE (OUTPATIENT)
Age: 65
End: 2025-07-18
Attending: INTERNAL MEDICINE
Payer: COMMERCIAL

## 2025-07-18 DIAGNOSIS — C34.91 ADENOCARCINOMA OF RIGHT LUNG (HCC): Primary | ICD-10-CM

## 2025-07-18 DIAGNOSIS — Z51.11 ENCOUNTER FOR CHEMOTHERAPY MANAGEMENT: ICD-10-CM

## 2025-07-18 RX ORDER — DIPHENHYDRAMINE HYDROCHLORIDE 50 MG/ML
25 INJECTION, SOLUTION INTRAMUSCULAR; INTRAVENOUS ONCE
OUTPATIENT
Start: 2025-07-18

## 2025-07-18 RX ORDER — FAMOTIDINE 10 MG/ML
20 INJECTION, SOLUTION INTRAVENOUS ONCE
OUTPATIENT
Start: 2025-07-18

## 2025-07-18 NOTE — PROGRESS NOTES
Cancer Center Video Visit  Progress Note    Patient Name: Caron Daniels   YOB: 1960   Medical Record Number: [id number]   CSN: [id number]   Consulting Physician: Elías Cole MD  Referring Physician(s): Marce Araya MD  Date of Visit: 7/18/2025    Chief Complaint:  Non small cell lung cancer-Adenocarcinoma, [id number], stage IIIB     History of Present Illness:     Caron Daniels is a 65 year old female that was seen today in the Cancer Center for evaluation of the above conditions. Patient has PMH relevant for extensive tobacco use history, nonischemic cardiomyopathy s/p AICD, COPD and prior ETOH use who was hospitalized from 3/17/24 through 3/24/24 for influenza, hypercapnic respiratory failure.  During that hospitalization patient underwent CT chest PE imaging on 3/17/24 which revealed the presence of right lower lobe pulmonary mass concerning for cancer measuring 3.8 x 2.4 cm. She was also noted to have 0.1 cm right hilar nodule, 0.9 cm left hilar nodule as well as 1.1 cm subcarinal lymph node clinically concerning for lymph node disease involvement with lung cancer. There was a 2.1cm groundglass opacity in the right lower lobe which may be infectious/inflammatory or possibly cancer related.  Caron reports feeling improved after the hospitalization.  She has regained the weight she has lost. Patient denies chest pain, dyspnea, nausea, vomiting, abdominal pain, no systemic signs of illness, no reports of voice change, cough or hemoptysis endorsed.    Pt was lost to follow up as she was without insurance coverage. Pt now has insurance coverage since 1/2025.    Interval History:  Patient returns prior to cycle 1 of chemoRT for re:NSCLC. She is now on supplemental oxygen at 2LNC continuous for COPD as he has some dyspnea. No new concerns on ROS. She has a non functioning ICD in place making it challenging to get MRI brain imaging completed, so CT brain was completed on 6/28/2025; negative  for mets.    She quit smoking in Spring,2024. Pt returned back to my care following biopsy of subcarinal node on 5/21 and right lower lobe mass returned w/ NSCLC-adenocarcinoma; CK+, TTF-1+, neg for CK20, p40, synatophysin and CD56. Her PET scan shows PET avidity consistent with N3 disease with bilateral perihilar and mediastinal  lymph nodes involved.       Allergies:   Allergies   Allergen Reactions    Augmentin [Amoxicillin-Pot Clavulanate] OTHER (SEE COMMENTS)     Per patient \"It makes me sick\"       Current Medications:  No outpatient medications have been marked as taking for the 7/18/25 encounter (Telemedicine) with Elías Cole MD.       Review of Systems:    Negative, except for pertinent positives noted in the the HPI.     Vital Signs:  There were no vitals taken for this visit.    Physical Examination:    General: Patient is alert and oriented x 3, not in acute distress.  Psych: Friendly, cooperative with appropriate questions and responses  HEENT: EOMs intact. Oropharynx is clear. Supplemental oxygen with 2LNC in place  Chest: No labored signs of breathing  Heart: No conversational dyspnea  Extremities: No edema or calf tenderness.  Neurological: Grossly intact.     Performance Status:    ECOG 0: Fully active, able to carry on all pre-disease performance without restriction      Labs:    Lab Results   Component Value Date/Time    WBC 6.6 07/17/2025 06:41 PM    RBC 4.59 07/17/2025 06:41 PM    HGB 12.9 07/17/2025 06:41 PM    HCT 40.5 07/17/2025 06:41 PM    MCV 88.2 07/17/2025 06:41 PM    MCH 28.1 07/17/2025 06:41 PM    MCHC 31.9 07/17/2025 06:41 PM    RDW 13.6 07/17/2025 06:41 PM    NEPRELIM 3.66 07/17/2025 06:41 PM    .0 07/17/2025 06:41 PM       Lab Results   Component Value Date/Time    GLU 83 07/17/2025 06:41 PM    BUN 11 07/17/2025 06:41 PM    CREATSERUM 0.89 07/17/2025 06:41 PM    GFRNAA >60 03/14/2017 05:56 AM    CA 9.1 07/17/2025 06:41 PM    ALB 4.6 07/17/2025 06:41 PM      07/17/2025 06:41 PM    K 3.9 07/17/2025 06:41 PM     07/17/2025 06:41 PM    CO2 30.0 07/17/2025 06:41 PM    ALKPHO 64 07/17/2025 06:41 PM    AST 13 07/17/2025 06:41 PM    ALT 9 (L) 07/17/2025 06:41 PM       Imaging:  CT brain w/wo 6/28/25; reviewed    Impression:    No diagnosis found.    PMH relevant for extensive tobacco use history, nonischemic cardiomyopathy s/p AICD, COPD and prior ETOH use presents for an evaluation of RLL mass consistent with for lung cancer    Plan:    1.) Right lung mass w/ extensive tobacco use hx, mE3L5H5, stage IIIB--Adenocarcinoma    --discussed with pt and her daughter-in-law that she appears to have stage IIIB disease by imaging  --her care was reviewed at tumor board and not deemed inoperable, so she will have to be treated with definitive chemoRT followed by consolidative immunotherapy with durvalumab for 1 yr  --we reviewed side effects of weekly low dose carbo AUC 2+Taxol 50 mg/m2 would be given concomitantly with RT; so pt established care with Dr. Edy Toscano in rad/onc on 6/19/25    --orders placed for MRI brain imaging; however, the pt has a nonfunctioning ICD in place preventing MRI brain imaging. 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

## 2025-07-24 ENCOUNTER — APPOINTMENT (OUTPATIENT)
Dept: RADIATION ONCOLOGY | Facility: HOSPITAL | Age: 65
End: 2025-07-24
Attending: INTERNAL MEDICINE
Payer: COMMERCIAL

## 2025-07-24 ENCOUNTER — NURSE ONLY (OUTPATIENT)
Facility: LOCATION | Age: 65
End: 2025-07-24
Attending: INTERNAL MEDICINE
Payer: COMMERCIAL

## 2025-07-24 ENCOUNTER — SOCIAL WORK SERVICES (OUTPATIENT)
Facility: LOCATION | Age: 65
End: 2025-07-24

## 2025-07-24 VITALS
TEMPERATURE: 98 F | HEART RATE: 85 BPM | OXYGEN SATURATION: 97 % | SYSTOLIC BLOOD PRESSURE: 102 MMHG | WEIGHT: 187 LBS | RESPIRATION RATE: 18 BRPM | BODY MASS INDEX: 32 KG/M2 | DIASTOLIC BLOOD PRESSURE: 67 MMHG

## 2025-07-24 DIAGNOSIS — C34.91 ADENOCARCINOMA OF RIGHT LUNG (HCC): Primary | ICD-10-CM

## 2025-07-24 PROCEDURE — 77386 HC IMRT COMPLEX: CPT | Performed by: RADIOLOGY

## 2025-07-24 RX ORDER — FAMOTIDINE 10 MG/ML
20 INJECTION, SOLUTION INTRAVENOUS ONCE
Status: COMPLETED | OUTPATIENT
Start: 2025-07-24 | End: 2025-07-24

## 2025-07-24 RX ORDER — DIPHENHYDRAMINE HYDROCHLORIDE 50 MG/ML
25 INJECTION, SOLUTION INTRAMUSCULAR; INTRAVENOUS ONCE
Status: COMPLETED | OUTPATIENT
Start: 2025-07-24 | End: 2025-07-24

## 2025-07-24 RX ORDER — DIPHENHYDRAMINE HYDROCHLORIDE 50 MG/ML
INJECTION, SOLUTION INTRAMUSCULAR; INTRAVENOUS
Status: COMPLETED
Start: 2025-07-24 | End: 2025-07-24

## 2025-07-24 RX ORDER — FAMOTIDINE 10 MG/ML
INJECTION, SOLUTION INTRAVENOUS
Status: COMPLETED
Start: 2025-07-24 | End: 2025-07-24

## 2025-07-24 RX ADMIN — FAMOTIDINE 20 MG: 10 INJECTION, SOLUTION INTRAVENOUS at 08:20:00

## 2025-07-24 RX ADMIN — DIPHENHYDRAMINE HYDROCHLORIDE 25 MG: 50 INJECTION, SOLUTION INTRAMUSCULAR; INTRAVENOUS at 08:24:00

## 2025-07-24 NOTE — PROGRESS NOTES
Pt here for C1D2 Drug(s)Taxol Carbo with concurrent RT.  Arrives Ambulating independently, accompanied by Spouse     Patient was evaluated today by Treatment Nurse. Patient uses 2L nasal cannula.     Oral medications included in this regimen:  no    Patient confirms comprehension of cancer treatment schedule:  yes    Pregnancy screening:  Not applicable    Modifications in dose or schedule:  No. Patient will transition to Fridays moving forward due to work schedule.     Medications appearance and physical integrity checked by RN: yes.    Chemotherapy IV pump settings verified by 2 RNs:  Yes.  Frequency of blood return and site check throughout administration: Prior to administration, Prior to each drug, and At completion of therapy     Infusion/treatment outcome:  patient tolerated treatment without incident    Education Record    Learner:  Patient and Spouse  Barriers / Limitations:  None  Method:  Brief focused, Discussion, Printed material, and Reinforcement  Education / instructions given:  Constipation management, anti-emetic protocol. Home chemotherapy precautions. When to call MD.   Outcome:  Shows understanding    Discharged MD appointment, Ambulating independently, accompanied by:Spouse    Patient/family verbalized understanding of future appointments: by printed AVS

## 2025-07-24 NOTE — PATIENT INSTRUCTIONS
ANTI-NAUSEA PROTOCOL    Take 1st:  PROCHLORPERAZINE (COMPAZINE) 10 mg Tablets  Take Compazine every 6 hours or 8 hours as needed for Nausea/Vomiting    Add Ondansetron if Prochlorperazine is ineffective by itself:  ONDANSETRON (ZOFRAN) 8mg Tablets  Take Zofran every 8 hours as needed for Nausea/Vomiting.   May Alternate with Compazine every 3-4 hours as needed     Please Call us if:   You are unable to take in liquids (at least 8-10 glasses per day)  If Dizzy, lightheaded or vomiting/diarrhea (6-8 times in 24 hours)

## 2025-07-24 NOTE — PROGRESS NOTES
SW completed an assessment with pt to provide support and encouragement due to new chemo starting today.      Pt is a 64 y/o woman  who lives in [place], [place] with her spouse.      Pt has  one adult son.     Patient is retired.     Social determinants of health assessment completed with pt and no needs identified at this time.     Pt presents with a pleasant affect and mood.Patient did not identify any feelings of anxiety about starting chemo today.     SW reviewed cancer support resources provided by [employer]. SW provided a pack of resources including information on transportation assistance, homecare/home health agencies and counseling resources. SW reviewed Advance Directives and importance of completion. SW explained role of SW in Cancer Center and provided Bringing Anitha gift bag. SW contact information given. Coping skills reviewed and support services encouraged due to new cancer dx.

## 2025-07-25 ENCOUNTER — TELEPHONE (OUTPATIENT)
Facility: LOCATION | Age: 65
End: 2025-07-25

## 2025-07-25 PROCEDURE — 77386 HC IMRT COMPLEX: CPT | Performed by: RADIOLOGY

## 2025-07-25 NOTE — TELEPHONE ENCOUNTER
Toxicities: C1 D1 Carboplatin/Paclitaxel with RT on 7/24/2025    Caron reports she feels \"good.\" No side effects at this time. I encouraged her to please call the office if she is not feeling well or she has any questions or concerns. She did ask if Dr Cole feels she should be on steroids to help her breathing due to her COPD. She said she is concerned that with getting the RT and chemo she will develop an issue with breathing. She does feel it is a little harder to breath with the heat and humidity.  She does have a pulmonologist, but he will want to know if Dr Cole is ok with her taking steroids. I told Caron I will forward this call to Dr Cole and JIM Joshi. She said she would like a call back.

## 2025-07-28 PROCEDURE — 77386 HC IMRT COMPLEX: CPT | Performed by: RADIOLOGY

## 2025-07-29 ENCOUNTER — OFFICE VISIT (OUTPATIENT)
Dept: RADIATION ONCOLOGY | Facility: HOSPITAL | Age: 65
End: 2025-07-29
Attending: INTERNAL MEDICINE
Payer: COMMERCIAL

## 2025-07-29 VITALS
RESPIRATION RATE: 18 BRPM | SYSTOLIC BLOOD PRESSURE: 169 MMHG | OXYGEN SATURATION: 96 % | HEART RATE: 88 BPM | WEIGHT: 190.38 LBS | DIASTOLIC BLOOD PRESSURE: 84 MMHG | TEMPERATURE: 99 F | BODY MASS INDEX: 33 KG/M2

## 2025-07-29 DIAGNOSIS — C34.91 ADENOCARCINOMA OF RIGHT LUNG (HCC): Primary | ICD-10-CM

## 2025-07-30 ENCOUNTER — DIETICIAN VISIT (OUTPATIENT)
Dept: NUTRITION | Facility: HOSPITAL | Age: 65
End: 2025-07-30

## 2025-07-30 VITALS — WEIGHT: 191 LBS | BODY MASS INDEX: 33 KG/M2

## 2025-07-30 PROCEDURE — 77386 HC IMRT COMPLEX: CPT | Performed by: RADIOLOGY

## 2025-07-31 PROCEDURE — 77386 HC IMRT COMPLEX: CPT | Performed by: RADIOLOGY

## 2025-08-01 ENCOUNTER — OFFICE VISIT (OUTPATIENT)
Facility: LOCATION | Age: 65
End: 2025-08-01
Attending: INTERNAL MEDICINE

## 2025-08-01 ENCOUNTER — NURSE ONLY (OUTPATIENT)
Facility: LOCATION | Age: 65
End: 2025-08-01
Attending: INTERNAL MEDICINE

## 2025-08-01 ENCOUNTER — APPOINTMENT (OUTPATIENT)
Dept: RADIATION ONCOLOGY | Facility: HOSPITAL | Age: 65
End: 2025-08-01
Attending: INTERNAL MEDICINE

## 2025-08-01 VITALS
BODY MASS INDEX: 31.71 KG/M2 | HEART RATE: 102 BPM | RESPIRATION RATE: 18 BRPM | DIASTOLIC BLOOD PRESSURE: 87 MMHG | WEIGHT: 188 LBS | SYSTOLIC BLOOD PRESSURE: 121 MMHG | TEMPERATURE: 99 F | OXYGEN SATURATION: 96 % | HEIGHT: 64.5 IN

## 2025-08-01 DIAGNOSIS — Z51.11 ENCOUNTER FOR CHEMOTHERAPY MANAGEMENT: ICD-10-CM

## 2025-08-01 DIAGNOSIS — C34.91 ADENOCARCINOMA OF RIGHT LUNG (HCC): ICD-10-CM

## 2025-08-01 DIAGNOSIS — Z51.11 ENCOUNTER FOR ANTINEOPLASTIC CHEMOTHERAPY: Primary | ICD-10-CM

## 2025-08-01 DIAGNOSIS — C34.91 ADENOCARCINOMA OF RIGHT LUNG (HCC): Primary | ICD-10-CM

## 2025-08-01 LAB
ALBUMIN SERPL-MCNC: 4.5 G/DL (ref 3.2–4.8)
ALBUMIN/GLOB SERPL: 1.8 (ref 1–2)
ALP LIVER SERPL-CCNC: 60 U/L (ref 50–130)
ALT SERPL-CCNC: 11 U/L (ref 10–49)
ANION GAP SERPL CALC-SCNC: 7 MMOL/L (ref 0–18)
AST SERPL-CCNC: 18 U/L (ref ?–34)
BASOPHILS # BLD AUTO: 0.04 X10(3) UL (ref 0–0.2)
BASOPHILS NFR BLD AUTO: 0.7 %
BILIRUB SERPL-MCNC: 0.3 MG/DL (ref 0.2–1.1)
BUN BLD-MCNC: 12 MG/DL (ref 9–23)
BUN/CREAT SERPL: 14.1 (ref 10–20)
CALCIUM BLD-MCNC: 9.4 MG/DL (ref 8.7–10.4)
CHLORIDE SERPL-SCNC: 103 MMOL/L (ref 98–112)
CO2 SERPL-SCNC: 28 MMOL/L (ref 21–32)
CREAT BLD-MCNC: 0.85 MG/DL (ref 0.55–1.02)
DEPRECATED RDW RBC AUTO: 43.9 FL (ref 35.1–46.3)
EGFRCR SERPLBLD CKD-EPI 2021: 76 ML/MIN/1.73M2 (ref 60–?)
EOSINOPHIL # BLD AUTO: 0.24 X10(3) UL (ref 0–0.7)
EOSINOPHIL NFR BLD AUTO: 4 %
ERYTHROCYTE [DISTWIDTH] IN BLOOD BY AUTOMATED COUNT: 13.4 % (ref 11–15)
GLOBULIN PLAS-MCNC: 2.5 G/DL (ref 2–3.5)
GLUCOSE BLD-MCNC: 112 MG/DL (ref 70–99)
HCT VFR BLD AUTO: 41.1 % (ref 35–48)
HGB BLD-MCNC: 13 G/DL (ref 12–16)
IMM GRANULOCYTES # BLD AUTO: 0.03 X10(3) UL (ref 0–1)
IMM GRANULOCYTES NFR BLD: 0.5 %
LYMPHOCYTES # BLD AUTO: 0.48 X10(3) UL (ref 1–4)
LYMPHOCYTES NFR BLD AUTO: 8.1 %
MCH RBC QN AUTO: 28.2 PG (ref 26–34)
MCHC RBC AUTO-ENTMCNC: 31.6 G/DL (ref 31–37)
MCV RBC AUTO: 89.2 FL (ref 80–100)
MONOCYTES # BLD AUTO: 0.37 X10(3) UL (ref 0.1–1)
MONOCYTES NFR BLD AUTO: 6.2 %
NEUTROPHILS # BLD AUTO: 4.78 X10 (3) UL (ref 1.5–7.7)
NEUTROPHILS # BLD AUTO: 4.78 X10(3) UL (ref 1.5–7.7)
NEUTROPHILS NFR BLD AUTO: 80.5 %
OSMOLALITY SERPL CALC.SUM OF ELEC: 287 MOSM/KG (ref 275–295)
PLATELET # BLD AUTO: 212 10(3)UL (ref 150–450)
POTASSIUM SERPL-SCNC: 4.8 MMOL/L (ref 3.5–5.1)
PROT SERPL-MCNC: 7 G/DL (ref 5.7–8.2)
RBC # BLD AUTO: 4.61 X10(6)UL (ref 3.8–5.3)
SODIUM SERPL-SCNC: 138 MMOL/L (ref 136–145)
WBC # BLD AUTO: 5.9 X10(3) UL (ref 4–11)

## 2025-08-01 PROCEDURE — 77386 HC IMRT COMPLEX: CPT | Performed by: RADIOLOGY

## 2025-08-01 PROCEDURE — 77336 RADIATION PHYSICS CONSULT: CPT | Performed by: RADIOLOGY

## 2025-08-01 RX ORDER — FAMOTIDINE 10 MG/ML
20 INJECTION, SOLUTION INTRAVENOUS ONCE
Status: CANCELLED | OUTPATIENT
Start: 2025-08-01

## 2025-08-01 RX ORDER — DIPHENHYDRAMINE HYDROCHLORIDE 50 MG/ML
25 INJECTION, SOLUTION INTRAMUSCULAR; INTRAVENOUS ONCE
Status: COMPLETED | OUTPATIENT
Start: 2025-08-01 | End: 2025-08-01

## 2025-08-01 RX ORDER — DIPHENHYDRAMINE HYDROCHLORIDE 50 MG/ML
25 INJECTION, SOLUTION INTRAMUSCULAR; INTRAVENOUS ONCE
Status: CANCELLED | OUTPATIENT
Start: 2025-08-01

## 2025-08-01 RX ORDER — FAMOTIDINE 10 MG/ML
INJECTION, SOLUTION INTRAVENOUS
Status: DISCONTINUED
Start: 2025-08-01 | End: 2025-08-01

## 2025-08-01 RX ORDER — FAMOTIDINE 10 MG/ML
20 INJECTION, SOLUTION INTRAVENOUS ONCE
Status: COMPLETED | OUTPATIENT
Start: 2025-08-01 | End: 2025-08-01

## 2025-08-01 RX ORDER — DIPHENHYDRAMINE HYDROCHLORIDE 50 MG/ML
INJECTION, SOLUTION INTRAMUSCULAR; INTRAVENOUS
Status: DISCONTINUED
Start: 2025-08-01 | End: 2025-08-01

## 2025-08-01 RX ADMIN — FAMOTIDINE 20 MG: 10 INJECTION, SOLUTION INTRAVENOUS at 10:37:00

## 2025-08-01 RX ADMIN — DIPHENHYDRAMINE HYDROCHLORIDE 25 MG: 50 INJECTION, SOLUTION INTRAMUSCULAR; INTRAVENOUS at 10:42:00

## 2025-08-04 PROCEDURE — 77386 HC IMRT COMPLEX: CPT | Performed by: RADIOLOGY

## 2025-08-05 ENCOUNTER — OFFICE VISIT (OUTPATIENT)
Dept: RADIATION ONCOLOGY | Facility: HOSPITAL | Age: 65
End: 2025-08-05
Attending: INTERNAL MEDICINE

## 2025-08-05 VITALS
WEIGHT: 191 LBS | HEART RATE: 85 BPM | SYSTOLIC BLOOD PRESSURE: 143 MMHG | RESPIRATION RATE: 18 BRPM | DIASTOLIC BLOOD PRESSURE: 85 MMHG | BODY MASS INDEX: 32 KG/M2 | OXYGEN SATURATION: 98 % | TEMPERATURE: 98 F

## 2025-08-05 DIAGNOSIS — C34.91 ADENOCARCINOMA OF RIGHT LUNG (HCC): Primary | ICD-10-CM

## 2025-08-05 PROCEDURE — 77386 HC IMRT COMPLEX: CPT | Performed by: RADIOLOGY

## 2025-08-06 ENCOUNTER — DIETICIAN VISIT (OUTPATIENT)
Dept: NUTRITION | Facility: HOSPITAL | Age: 65
End: 2025-08-06

## 2025-08-06 VITALS — BODY MASS INDEX: 32 KG/M2 | WEIGHT: 188.38 LBS

## 2025-08-06 DIAGNOSIS — C34.91 ADENOCARCINOMA OF RIGHT LUNG (HCC): Primary | ICD-10-CM

## 2025-08-06 PROCEDURE — 77386 HC IMRT COMPLEX: CPT | Performed by: RADIOLOGY

## 2025-08-06 RX ORDER — LIDOCAINE HYDROCHLORIDE 20 MG/ML
5 SOLUTION OROPHARYNGEAL
Qty: 100 ML | Refills: 2 | Status: SHIPPED | OUTPATIENT
Start: 2025-08-06

## 2025-08-07 PROCEDURE — 77386 HC IMRT COMPLEX: CPT | Performed by: RADIOLOGY

## 2025-08-08 ENCOUNTER — NURSE ONLY (OUTPATIENT)
Facility: LOCATION | Age: 65
End: 2025-08-08
Attending: INTERNAL MEDICINE

## 2025-08-08 ENCOUNTER — OFFICE VISIT (OUTPATIENT)
Facility: LOCATION | Age: 65
End: 2025-08-08
Attending: INTERNAL MEDICINE

## 2025-08-08 VITALS
TEMPERATURE: 98 F | SYSTOLIC BLOOD PRESSURE: 121 MMHG | DIASTOLIC BLOOD PRESSURE: 52 MMHG | RESPIRATION RATE: 18 BRPM | HEART RATE: 97 BPM | OXYGEN SATURATION: 97 %

## 2025-08-08 DIAGNOSIS — C34.91 ADENOCARCINOMA OF RIGHT LUNG (HCC): ICD-10-CM

## 2025-08-08 DIAGNOSIS — Z51.11 ENCOUNTER FOR ANTINEOPLASTIC CHEMOTHERAPY: Primary | ICD-10-CM

## 2025-08-08 DIAGNOSIS — C34.91 ADENOCARCINOMA OF RIGHT LUNG (HCC): Primary | ICD-10-CM

## 2025-08-08 LAB
ALBUMIN SERPL-MCNC: 4.7 G/DL (ref 3.2–4.8)
ALBUMIN/GLOB SERPL: 1.8 (ref 1–2)
ALP LIVER SERPL-CCNC: 58 U/L (ref 50–130)
ALT SERPL-CCNC: 12 U/L (ref 10–49)
ANION GAP SERPL CALC-SCNC: 8 MMOL/L (ref 0–18)
AST SERPL-CCNC: 14 U/L (ref ?–34)
BASOPHILS # BLD AUTO: 0.02 X10(3) UL (ref 0–0.2)
BASOPHILS NFR BLD AUTO: 0.5 %
BILIRUB SERPL-MCNC: 0.3 MG/DL (ref 0.2–1.1)
BUN BLD-MCNC: 14 MG/DL (ref 9–23)
BUN/CREAT SERPL: 16.5 (ref 10–20)
CALCIUM BLD-MCNC: 9.3 MG/DL (ref 8.7–10.4)
CHLORIDE SERPL-SCNC: 101 MMOL/L (ref 98–112)
CO2 SERPL-SCNC: 30 MMOL/L (ref 21–32)
CREAT BLD-MCNC: 0.85 MG/DL (ref 0.55–1.02)
DEPRECATED RDW RBC AUTO: 43.8 FL (ref 35.1–46.3)
EGFRCR SERPLBLD CKD-EPI 2021: 76 ML/MIN/1.73M2 (ref 60–?)
EOSINOPHIL # BLD AUTO: 0.09 X10(3) UL (ref 0–0.7)
EOSINOPHIL NFR BLD AUTO: 2.1 %
ERYTHROCYTE [DISTWIDTH] IN BLOOD BY AUTOMATED COUNT: 13.7 % (ref 11–15)
FASTING STATUS PATIENT QL REPORTED: NO
GLOBULIN PLAS-MCNC: 2.6 G/DL (ref 2–3.5)
GLUCOSE BLD-MCNC: 84 MG/DL (ref 70–99)
HCT VFR BLD AUTO: 39 % (ref 35–48)
HGB BLD-MCNC: 12.4 G/DL (ref 12–16)
IMM GRANULOCYTES # BLD AUTO: 0.02 X10(3) UL (ref 0–1)
IMM GRANULOCYTES NFR BLD: 0.5 %
LYMPHOCYTES # BLD AUTO: 0.28 X10(3) UL (ref 1–4)
LYMPHOCYTES NFR BLD AUTO: 6.6 %
MCH RBC QN AUTO: 28 PG (ref 26–34)
MCHC RBC AUTO-ENTMCNC: 31.8 G/DL (ref 31–37)
MCV RBC AUTO: 88 FL (ref 80–100)
MONOCYTES # BLD AUTO: 0.4 X10(3) UL (ref 0.1–1)
MONOCYTES NFR BLD AUTO: 9.4 %
NEUTROPHILS # BLD AUTO: 3.44 X10 (3) UL (ref 1.5–7.7)
NEUTROPHILS # BLD AUTO: 3.44 X10(3) UL (ref 1.5–7.7)
NEUTROPHILS NFR BLD AUTO: 80.9 %
OSMOLALITY SERPL CALC.SUM OF ELEC: 288 MOSM/KG (ref 275–295)
PLATELET # BLD AUTO: 173 10(3)UL (ref 150–450)
POTASSIUM SERPL-SCNC: 3.9 MMOL/L (ref 3.5–5.1)
PROT SERPL-MCNC: 7.3 G/DL (ref 5.7–8.2)
RBC # BLD AUTO: 4.43 X10(6)UL (ref 3.8–5.3)
SODIUM SERPL-SCNC: 139 MMOL/L (ref 136–145)
WBC # BLD AUTO: 4.3 X10(3) UL (ref 4–11)

## 2025-08-08 PROCEDURE — 77336 RADIATION PHYSICS CONSULT: CPT | Performed by: RADIOLOGY

## 2025-08-08 PROCEDURE — 77386 HC IMRT COMPLEX: CPT | Performed by: RADIOLOGY

## 2025-08-08 RX ORDER — FAMOTIDINE 10 MG/ML
INJECTION, SOLUTION INTRAVENOUS
Status: COMPLETED
Start: 2025-08-08 | End: 2025-08-08

## 2025-08-08 RX ORDER — FAMOTIDINE 10 MG/ML
20 INJECTION, SOLUTION INTRAVENOUS ONCE
Status: CANCELLED | OUTPATIENT
Start: 2025-08-08

## 2025-08-08 RX ORDER — FAMOTIDINE 10 MG/ML
20 INJECTION, SOLUTION INTRAVENOUS ONCE
Status: COMPLETED | OUTPATIENT
Start: 2025-08-08 | End: 2025-08-08

## 2025-08-08 RX ORDER — DIPHENHYDRAMINE HYDROCHLORIDE 50 MG/ML
25 INJECTION, SOLUTION INTRAMUSCULAR; INTRAVENOUS ONCE
Status: CANCELLED | OUTPATIENT
Start: 2025-08-08

## 2025-08-08 RX ORDER — DIPHENHYDRAMINE HYDROCHLORIDE 50 MG/ML
INJECTION, SOLUTION INTRAMUSCULAR; INTRAVENOUS
Status: COMPLETED
Start: 2025-08-08 | End: 2025-08-08

## 2025-08-08 RX ORDER — DIPHENHYDRAMINE HYDROCHLORIDE 50 MG/ML
25 INJECTION, SOLUTION INTRAMUSCULAR; INTRAVENOUS ONCE
Status: COMPLETED | OUTPATIENT
Start: 2025-08-08 | End: 2025-08-08

## 2025-08-08 RX ADMIN — DIPHENHYDRAMINE HYDROCHLORIDE 25 MG: 50 INJECTION, SOLUTION INTRAMUSCULAR; INTRAVENOUS at 11:35:00

## 2025-08-08 RX ADMIN — FAMOTIDINE 20 MG: 10 INJECTION, SOLUTION INTRAVENOUS at 11:36:00

## 2025-08-11 PROCEDURE — 77386 HC IMRT COMPLEX: CPT | Performed by: RADIOLOGY

## 2025-08-12 ENCOUNTER — OFFICE VISIT (OUTPATIENT)
Dept: RADIATION ONCOLOGY | Facility: HOSPITAL | Age: 65
End: 2025-08-12
Attending: INTERNAL MEDICINE

## 2025-08-12 VITALS
WEIGHT: 186.63 LBS | SYSTOLIC BLOOD PRESSURE: 133 MMHG | RESPIRATION RATE: 18 BRPM | OXYGEN SATURATION: 97 % | DIASTOLIC BLOOD PRESSURE: 88 MMHG | BODY MASS INDEX: 32 KG/M2 | HEART RATE: 78 BPM | TEMPERATURE: 98 F

## 2025-08-12 DIAGNOSIS — C34.91 ADENOCARCINOMA OF RIGHT LUNG (HCC): Primary | ICD-10-CM

## 2025-08-12 PROCEDURE — 77386 HC IMRT COMPLEX: CPT | Performed by: RADIOLOGY

## 2025-08-12 RX ORDER — HYDROCODONE BITARTRATE AND ACETAMINOPHEN 5; 325 MG/1; MG/1
1-2 TABLET ORAL EVERY 4 HOURS PRN
Qty: 50 TABLET | Refills: 0 | Status: SHIPPED | OUTPATIENT
Start: 2025-08-12

## 2025-08-13 ENCOUNTER — DIETICIAN VISIT (OUTPATIENT)
Dept: NUTRITION | Facility: HOSPITAL | Age: 65
End: 2025-08-13

## 2025-08-13 VITALS — BODY MASS INDEX: 32 KG/M2 | WEIGHT: 186.63 LBS

## 2025-08-13 PROCEDURE — 77386 HC IMRT COMPLEX: CPT | Performed by: RADIOLOGY

## 2025-08-14 PROCEDURE — 77386 HC IMRT COMPLEX: CPT | Performed by: RADIOLOGY

## 2025-08-15 ENCOUNTER — NURSE ONLY (OUTPATIENT)
Facility: LOCATION | Age: 65
End: 2025-08-15
Attending: INTERNAL MEDICINE

## 2025-08-15 ENCOUNTER — OFFICE VISIT (OUTPATIENT)
Facility: LOCATION | Age: 65
End: 2025-08-15
Attending: INTERNAL MEDICINE

## 2025-08-15 VITALS
OXYGEN SATURATION: 92 % | BODY MASS INDEX: 31.54 KG/M2 | DIASTOLIC BLOOD PRESSURE: 74 MMHG | RESPIRATION RATE: 18 BRPM | HEART RATE: 93 BPM | TEMPERATURE: 98 F | SYSTOLIC BLOOD PRESSURE: 121 MMHG | HEIGHT: 64.5 IN | WEIGHT: 187 LBS

## 2025-08-15 DIAGNOSIS — Z51.11 ENCOUNTER FOR ANTINEOPLASTIC CHEMOTHERAPY: Primary | ICD-10-CM

## 2025-08-15 DIAGNOSIS — C34.91 ADENOCARCINOMA OF RIGHT LUNG (HCC): Primary | ICD-10-CM

## 2025-08-15 DIAGNOSIS — Z51.11 ENCOUNTER FOR CHEMOTHERAPY MANAGEMENT: ICD-10-CM

## 2025-08-15 DIAGNOSIS — C34.91 ADENOCARCINOMA OF RIGHT LUNG (HCC): ICD-10-CM

## 2025-08-15 LAB
ALBUMIN SERPL-MCNC: 4.4 G/DL (ref 3.2–4.8)
ALBUMIN/GLOB SERPL: 1.8 (ref 1–2)
ALP LIVER SERPL-CCNC: 55 U/L (ref 50–130)
ALT SERPL-CCNC: 9 U/L (ref 10–49)
ANION GAP SERPL CALC-SCNC: 10 MMOL/L (ref 0–18)
AST SERPL-CCNC: 13 U/L (ref ?–34)
BASOPHILS # BLD AUTO: 0.03 X10(3) UL (ref 0–0.2)
BASOPHILS NFR BLD AUTO: 0.8 %
BILIRUB SERPL-MCNC: 0.2 MG/DL (ref 0.2–1.1)
BUN BLD-MCNC: 14 MG/DL (ref 9–23)
BUN/CREAT SERPL: 15.4 (ref 10–20)
CALCIUM BLD-MCNC: 9.5 MG/DL (ref 8.7–10.4)
CHLORIDE SERPL-SCNC: 101 MMOL/L (ref 98–112)
CO2 SERPL-SCNC: 30 MMOL/L (ref 21–32)
CREAT BLD-MCNC: 0.91 MG/DL (ref 0.55–1.02)
DEPRECATED RDW RBC AUTO: 42.8 FL (ref 35.1–46.3)
EGFRCR SERPLBLD CKD-EPI 2021: 70 ML/MIN/1.73M2 (ref 60–?)
EOSINOPHIL # BLD AUTO: 0.03 X10(3) UL (ref 0–0.7)
EOSINOPHIL NFR BLD AUTO: 0.8 %
ERYTHROCYTE [DISTWIDTH] IN BLOOD BY AUTOMATED COUNT: 14.1 % (ref 11–15)
GLOBULIN PLAS-MCNC: 2.5 G/DL (ref 2–3.5)
GLUCOSE BLD-MCNC: 121 MG/DL (ref 70–99)
HCT VFR BLD AUTO: 38.3 % (ref 35–48)
HGB BLD-MCNC: 12.4 G/DL (ref 12–16)
IMM GRANULOCYTES # BLD AUTO: 0.01 X10(3) UL (ref 0–1)
IMM GRANULOCYTES NFR BLD: 0.3 %
LYMPHOCYTES # BLD AUTO: 0.2 X10(3) UL (ref 1–4)
LYMPHOCYTES NFR BLD AUTO: 5.3 %
MCH RBC QN AUTO: 28.2 PG (ref 26–34)
MCHC RBC AUTO-ENTMCNC: 32.4 G/DL (ref 31–37)
MCV RBC AUTO: 87.2 FL (ref 80–100)
MONOCYTES # BLD AUTO: 0.38 X10(3) UL (ref 0.1–1)
MONOCYTES NFR BLD AUTO: 10.1 %
NEUTROPHILS # BLD AUTO: 3.1 X10 (3) UL (ref 1.5–7.7)
NEUTROPHILS # BLD AUTO: 3.1 X10(3) UL (ref 1.5–7.7)
NEUTROPHILS NFR BLD AUTO: 82.7 %
OSMOLALITY SERPL CALC.SUM OF ELEC: 294 MOSM/KG (ref 275–295)
PLATELET # BLD AUTO: 172 10(3)UL (ref 150–450)
POTASSIUM SERPL-SCNC: 4.1 MMOL/L (ref 3.5–5.1)
PROT SERPL-MCNC: 6.9 G/DL (ref 5.7–8.2)
RBC # BLD AUTO: 4.39 X10(6)UL (ref 3.8–5.3)
SODIUM SERPL-SCNC: 141 MMOL/L (ref 136–145)
WBC # BLD AUTO: 3.8 X10(3) UL (ref 4–11)

## 2025-08-15 PROCEDURE — 77386 HC IMRT COMPLEX: CPT | Performed by: RADIOLOGY

## 2025-08-15 PROCEDURE — 77336 RADIATION PHYSICS CONSULT: CPT | Performed by: RADIOLOGY

## 2025-08-15 RX ORDER — FAMOTIDINE 10 MG/ML
INJECTION, SOLUTION INTRAVENOUS
Status: COMPLETED
Start: 2025-08-15 | End: 2025-08-15

## 2025-08-15 RX ORDER — FAMOTIDINE 10 MG/ML
20 INJECTION, SOLUTION INTRAVENOUS ONCE
Status: CANCELLED | OUTPATIENT
Start: 2025-08-15

## 2025-08-15 RX ORDER — DIPHENHYDRAMINE HYDROCHLORIDE 50 MG/ML
25 INJECTION, SOLUTION INTRAMUSCULAR; INTRAVENOUS ONCE
Status: COMPLETED | OUTPATIENT
Start: 2025-08-15 | End: 2025-08-15

## 2025-08-15 RX ORDER — FAMOTIDINE 10 MG/ML
20 INJECTION, SOLUTION INTRAVENOUS ONCE
Status: COMPLETED | OUTPATIENT
Start: 2025-08-15 | End: 2025-08-15

## 2025-08-15 RX ORDER — DIPHENHYDRAMINE HYDROCHLORIDE 50 MG/ML
INJECTION, SOLUTION INTRAMUSCULAR; INTRAVENOUS
Status: COMPLETED
Start: 2025-08-15 | End: 2025-08-15

## 2025-08-15 RX ORDER — DIPHENHYDRAMINE HYDROCHLORIDE 50 MG/ML
25 INJECTION, SOLUTION INTRAMUSCULAR; INTRAVENOUS ONCE
Status: CANCELLED | OUTPATIENT
Start: 2025-08-15

## 2025-08-15 RX ADMIN — DIPHENHYDRAMINE HYDROCHLORIDE 25 MG: 50 INJECTION, SOLUTION INTRAMUSCULAR; INTRAVENOUS at 11:00:00

## 2025-08-15 RX ADMIN — FAMOTIDINE 20 MG: 10 INJECTION, SOLUTION INTRAVENOUS at 10:56:00

## 2025-08-18 PROCEDURE — 77386 HC IMRT COMPLEX: CPT | Performed by: RADIOLOGY

## 2025-08-19 ENCOUNTER — OFFICE VISIT (OUTPATIENT)
Dept: RADIATION ONCOLOGY | Facility: HOSPITAL | Age: 65
End: 2025-08-19
Attending: INTERNAL MEDICINE

## 2025-08-19 VITALS
RESPIRATION RATE: 20 BRPM | SYSTOLIC BLOOD PRESSURE: 121 MMHG | DIASTOLIC BLOOD PRESSURE: 76 MMHG | HEART RATE: 67 BPM | OXYGEN SATURATION: 97 % | WEIGHT: 184.19 LBS | TEMPERATURE: 98 F | BODY MASS INDEX: 31 KG/M2

## 2025-08-19 DIAGNOSIS — C34.91 ADENOCARCINOMA OF RIGHT LUNG (HCC): Primary | ICD-10-CM

## 2025-08-19 PROCEDURE — 77386 HC IMRT COMPLEX: CPT | Performed by: RADIOLOGY

## 2025-08-20 ENCOUNTER — OFFICE VISIT (OUTPATIENT)
Dept: INTERNAL MEDICINE CLINIC | Facility: CLINIC | Age: 65
End: 2025-08-20

## 2025-08-20 ENCOUNTER — DIETICIAN VISIT (OUTPATIENT)
Dept: NUTRITION | Facility: HOSPITAL | Age: 65
End: 2025-08-20

## 2025-08-20 VITALS
HEART RATE: 100 BPM | HEIGHT: 64.5 IN | SYSTOLIC BLOOD PRESSURE: 110 MMHG | WEIGHT: 184 LBS | OXYGEN SATURATION: 92 % | BODY MASS INDEX: 31.03 KG/M2 | DIASTOLIC BLOOD PRESSURE: 70 MMHG

## 2025-08-20 DIAGNOSIS — I50.9 CONGESTIVE HEART FAILURE, UNSPECIFIED HF CHRONICITY, UNSPECIFIED HEART FAILURE TYPE (HCC): ICD-10-CM

## 2025-08-20 DIAGNOSIS — G44.89 OTHER HEADACHE SYNDROME: ICD-10-CM

## 2025-08-20 DIAGNOSIS — J44.9 CHRONIC OBSTRUCTIVE PULMONARY DISEASE, UNSPECIFIED COPD TYPE (HCC): ICD-10-CM

## 2025-08-20 DIAGNOSIS — I42.9 CARDIOMYOPATHY, UNSPECIFIED TYPE (HCC): ICD-10-CM

## 2025-08-20 DIAGNOSIS — E66.09 CLASS 1 OBESITY DUE TO EXCESS CALORIES WITH SERIOUS COMORBIDITY AND BODY MASS INDEX (BMI) OF 31.0 TO 31.9 IN ADULT: ICD-10-CM

## 2025-08-20 DIAGNOSIS — Z95.810 ICD (IMPLANTABLE CARDIOVERTER-DEFIBRILLATOR) IN PLACE: ICD-10-CM

## 2025-08-20 DIAGNOSIS — E66.811 CLASS 1 OBESITY DUE TO EXCESS CALORIES WITH SERIOUS COMORBIDITY AND BODY MASS INDEX (BMI) OF 31.0 TO 31.9 IN ADULT: ICD-10-CM

## 2025-08-20 DIAGNOSIS — C34.91 ADENOCARCINOMA OF RIGHT LUNG (HCC): ICD-10-CM

## 2025-08-20 DIAGNOSIS — E78.5 HYPERLIPIDEMIA, UNSPECIFIED HYPERLIPIDEMIA TYPE: ICD-10-CM

## 2025-08-20 DIAGNOSIS — E11.9 TYPE 2 DIABETES MELLITUS WITHOUT COMPLICATION, WITHOUT LONG-TERM CURRENT USE OF INSULIN (HCC): Primary | ICD-10-CM

## 2025-08-20 PROBLEM — R59.0 MEDIASTINAL LYMPHADENOPATHY: Status: RESOLVED | Noted: 2025-05-22 | Resolved: 2025-08-20

## 2025-08-20 PROBLEM — R91.8 LUNG MASS: Status: RESOLVED | Noted: 2024-04-08 | Resolved: 2025-08-20

## 2025-08-20 LAB — HEMOGLOBIN A1C: 5.5 % (ref 4.3–5.6)

## 2025-08-20 PROCEDURE — 77386 HC IMRT COMPLEX: CPT | Performed by: RADIOLOGY

## 2025-08-20 RX ORDER — ASPIRIN 325 MG
325 TABLET ORAL EVERY 6 HOURS PRN
COMMUNITY

## 2025-08-21 PROCEDURE — 77386 HC IMRT COMPLEX: CPT | Performed by: RADIOLOGY

## 2025-08-21 PROCEDURE — 77336 RADIATION PHYSICS CONSULT: CPT | Performed by: RADIOLOGY

## 2025-08-22 ENCOUNTER — APPOINTMENT (OUTPATIENT)
Facility: LOCATION | Age: 65
End: 2025-08-22

## 2025-08-22 ENCOUNTER — NURSE ONLY (OUTPATIENT)
Facility: LOCATION | Age: 65
End: 2025-08-22
Attending: INTERNAL MEDICINE

## 2025-08-22 ENCOUNTER — OFFICE VISIT (OUTPATIENT)
Facility: LOCATION | Age: 65
End: 2025-08-22
Attending: INTERNAL MEDICINE

## 2025-08-22 VITALS
WEIGHT: 181.69 LBS | DIASTOLIC BLOOD PRESSURE: 62 MMHG | SYSTOLIC BLOOD PRESSURE: 113 MMHG | HEART RATE: 87 BPM | RESPIRATION RATE: 18 BRPM | TEMPERATURE: 99 F | OXYGEN SATURATION: 97 % | BODY MASS INDEX: 31 KG/M2

## 2025-08-22 DIAGNOSIS — C34.91 ADENOCARCINOMA OF RIGHT LUNG (HCC): Primary | ICD-10-CM

## 2025-08-22 DIAGNOSIS — Z51.11 ENCOUNTER FOR CHEMOTHERAPY MANAGEMENT: ICD-10-CM

## 2025-08-22 LAB
ALBUMIN SERPL-MCNC: 4.2 G/DL (ref 3.2–4.8)
ALBUMIN/GLOB SERPL: 1.8 (ref 1–2)
ALP LIVER SERPL-CCNC: 53 U/L (ref 50–130)
ALT SERPL-CCNC: 8 U/L (ref 10–49)
ANION GAP SERPL CALC-SCNC: 7 MMOL/L (ref 0–18)
AST SERPL-CCNC: 12 U/L (ref ?–34)
BASOPHILS # BLD AUTO: 0.02 X10(3) UL (ref 0–0.2)
BASOPHILS NFR BLD AUTO: 0.6 %
BILIRUB SERPL-MCNC: 0.3 MG/DL (ref 0.2–1.1)
BUN BLD-MCNC: 9 MG/DL (ref 9–23)
BUN/CREAT SERPL: 12.2 (ref 10–20)
CALCIUM BLD-MCNC: 9.4 MG/DL (ref 8.7–10.4)
CHLORIDE SERPL-SCNC: 104 MMOL/L (ref 98–112)
CO2 SERPL-SCNC: 28 MMOL/L (ref 21–32)
CREAT BLD-MCNC: 0.74 MG/DL (ref 0.55–1.02)
DEPRECATED RDW RBC AUTO: 44 FL (ref 35.1–46.3)
EGFRCR SERPLBLD CKD-EPI 2021: 90 ML/MIN/1.73M2 (ref 60–?)
EOSINOPHIL # BLD AUTO: 0.01 X10(3) UL (ref 0–0.7)
EOSINOPHIL NFR BLD AUTO: 0.3 %
ERYTHROCYTE [DISTWIDTH] IN BLOOD BY AUTOMATED COUNT: 14.4 % (ref 11–15)
GLOBULIN PLAS-MCNC: 2.3 G/DL (ref 2–3.5)
GLUCOSE BLD-MCNC: 115 MG/DL (ref 70–99)
HCT VFR BLD AUTO: 35.2 % (ref 35–48)
HGB BLD-MCNC: 11.5 G/DL (ref 12–16)
IMM GRANULOCYTES # BLD AUTO: 0.02 X10(3) UL (ref 0–1)
IMM GRANULOCYTES NFR BLD: 0.6 %
LYMPHOCYTES # BLD AUTO: 0.23 X10(3) UL (ref 1–4)
LYMPHOCYTES NFR BLD AUTO: 6.9 %
MCH RBC QN AUTO: 28.3 PG (ref 26–34)
MCHC RBC AUTO-ENTMCNC: 32.7 G/DL (ref 31–37)
MCV RBC AUTO: 86.5 FL (ref 80–100)
MONOCYTES # BLD AUTO: 0.28 X10(3) UL (ref 0.1–1)
MONOCYTES NFR BLD AUTO: 8.4 %
NEUTROPHILS # BLD AUTO: 2.77 X10 (3) UL (ref 1.5–7.7)
NEUTROPHILS # BLD AUTO: 2.77 X10(3) UL (ref 1.5–7.7)
NEUTROPHILS NFR BLD AUTO: 83.2 %
OSMOLALITY SERPL CALC.SUM OF ELEC: 288 MOSM/KG (ref 275–295)
PLATELET # BLD AUTO: 159 10(3)UL (ref 150–450)
POTASSIUM SERPL-SCNC: 4.4 MMOL/L (ref 3.5–5.1)
PROT SERPL-MCNC: 6.5 G/DL (ref 5.7–8.2)
RBC # BLD AUTO: 4.07 X10(6)UL (ref 3.8–5.3)
SODIUM SERPL-SCNC: 139 MMOL/L (ref 136–145)
WBC # BLD AUTO: 3.3 X10(3) UL (ref 4–11)

## 2025-08-22 PROCEDURE — 77386 HC IMRT COMPLEX: CPT | Performed by: RADIOLOGY

## 2025-08-22 RX ORDER — DIPHENHYDRAMINE HYDROCHLORIDE 50 MG/ML
25 INJECTION, SOLUTION INTRAMUSCULAR; INTRAVENOUS ONCE
Status: COMPLETED | OUTPATIENT
Start: 2025-08-22 | End: 2025-08-22

## 2025-08-22 RX ORDER — FAMOTIDINE 10 MG/ML
20 INJECTION, SOLUTION INTRAVENOUS ONCE
Status: COMPLETED | OUTPATIENT
Start: 2025-08-22 | End: 2025-08-22

## 2025-08-22 RX ORDER — FAMOTIDINE 10 MG/ML
20 INJECTION, SOLUTION INTRAVENOUS ONCE
Status: CANCELLED | OUTPATIENT
Start: 2025-08-22

## 2025-08-22 RX ORDER — DIPHENHYDRAMINE HYDROCHLORIDE 50 MG/ML
INJECTION, SOLUTION INTRAMUSCULAR; INTRAVENOUS
Status: DISPENSED
Start: 2025-08-22 | End: 2025-08-23

## 2025-08-22 RX ORDER — DIPHENHYDRAMINE HYDROCHLORIDE 50 MG/ML
25 INJECTION, SOLUTION INTRAMUSCULAR; INTRAVENOUS ONCE
Status: CANCELLED | OUTPATIENT
Start: 2025-08-22

## 2025-08-22 RX ORDER — FAMOTIDINE 10 MG/ML
INJECTION, SOLUTION INTRAVENOUS
Status: DISPENSED
Start: 2025-08-22 | End: 2025-08-23

## 2025-08-22 RX ADMIN — FAMOTIDINE 20 MG: 10 INJECTION, SOLUTION INTRAVENOUS at 12:42:00

## 2025-08-22 RX ADMIN — DIPHENHYDRAMINE HYDROCHLORIDE 25 MG: 50 INJECTION, SOLUTION INTRAMUSCULAR; INTRAVENOUS at 12:44:00

## 2025-08-25 DIAGNOSIS — J44.9 CHRONIC OBSTRUCTIVE PULMONARY DISEASE, UNSPECIFIED COPD TYPE (HCC): ICD-10-CM

## 2025-08-25 PROCEDURE — 77386 HC IMRT COMPLEX: CPT | Performed by: RADIOLOGY

## 2025-08-26 ENCOUNTER — OFFICE VISIT (OUTPATIENT)
Dept: RADIATION ONCOLOGY | Facility: HOSPITAL | Age: 65
End: 2025-08-26
Attending: INTERNAL MEDICINE

## 2025-08-26 VITALS
BODY MASS INDEX: 31 KG/M2 | TEMPERATURE: 98 F | RESPIRATION RATE: 18 BRPM | WEIGHT: 184.19 LBS | DIASTOLIC BLOOD PRESSURE: 85 MMHG | HEART RATE: 64 BPM | OXYGEN SATURATION: 96 % | SYSTOLIC BLOOD PRESSURE: 131 MMHG

## 2025-08-26 DIAGNOSIS — C34.91 ADENOCARCINOMA OF RIGHT LUNG (HCC): Primary | ICD-10-CM

## 2025-08-26 PROCEDURE — 77386 HC IMRT COMPLEX: CPT | Performed by: RADIOLOGY

## 2025-08-26 RX ORDER — TIOTROPIUM BROMIDE 18 UG/1
1 CAPSULE ORAL; RESPIRATORY (INHALATION) DAILY
Qty: 30 CAPSULE | Refills: 5 | Status: SHIPPED | OUTPATIENT
Start: 2025-08-26

## 2025-08-27 ENCOUNTER — DIETICIAN VISIT (OUTPATIENT)
Dept: NUTRITION | Facility: HOSPITAL | Age: 65
End: 2025-08-27

## 2025-08-27 VITALS — WEIGHT: 183.81 LBS | BODY MASS INDEX: 31 KG/M2

## 2025-08-27 PROCEDURE — 77386 HC IMRT COMPLEX: CPT | Performed by: RADIOLOGY

## 2025-08-28 PROCEDURE — 77386 HC IMRT COMPLEX: CPT | Performed by: RADIOLOGY

## 2025-08-29 ENCOUNTER — OFFICE VISIT (OUTPATIENT)
Facility: LOCATION | Age: 65
End: 2025-08-29
Attending: INTERNAL MEDICINE

## 2025-08-29 ENCOUNTER — NURSE ONLY (OUTPATIENT)
Facility: LOCATION | Age: 65
End: 2025-08-29
Attending: INTERNAL MEDICINE

## 2025-08-29 VITALS
WEIGHT: 182 LBS | SYSTOLIC BLOOD PRESSURE: 110 MMHG | TEMPERATURE: 99 F | DIASTOLIC BLOOD PRESSURE: 77 MMHG | RESPIRATION RATE: 18 BRPM | OXYGEN SATURATION: 95 % | HEIGHT: 64.5 IN | HEART RATE: 89 BPM | BODY MASS INDEX: 30.69 KG/M2

## 2025-08-29 DIAGNOSIS — Z51.11 ENCOUNTER FOR CHEMOTHERAPY MANAGEMENT: ICD-10-CM

## 2025-08-29 DIAGNOSIS — C34.31 PRIMARY MALIGNANT NEOPLASM OF RIGHT LOWER LOBE OF LUNG (HCC): ICD-10-CM

## 2025-08-29 DIAGNOSIS — C34.91 ADENOCARCINOMA OF RIGHT LUNG (HCC): Primary | ICD-10-CM

## 2025-08-29 LAB
ALBUMIN SERPL-MCNC: 4.2 G/DL (ref 3.2–4.8)
ALBUMIN/GLOB SERPL: 1.8 (ref 1–2)
ALP LIVER SERPL-CCNC: 53 U/L (ref 50–130)
ALT SERPL-CCNC: 8 U/L (ref 10–49)
ANION GAP SERPL CALC-SCNC: 8 MMOL/L (ref 0–18)
AST SERPL-CCNC: 11 U/L (ref ?–34)
BASOPHILS # BLD AUTO: 0.01 X10(3) UL (ref 0–0.2)
BASOPHILS NFR BLD AUTO: 0.4 %
BILIRUB SERPL-MCNC: 0.2 MG/DL (ref 0.2–1.1)
BUN BLD-MCNC: 9 MG/DL (ref 9–23)
BUN/CREAT SERPL: 11.7 (ref 10–20)
CALCIUM BLD-MCNC: 9.1 MG/DL (ref 8.7–10.4)
CHLORIDE SERPL-SCNC: 106 MMOL/L (ref 98–112)
CO2 SERPL-SCNC: 28 MMOL/L (ref 21–32)
CREAT BLD-MCNC: 0.77 MG/DL (ref 0.55–1.02)
DEPRECATED RDW RBC AUTO: 45.7 FL (ref 35.1–46.3)
EGFRCR SERPLBLD CKD-EPI 2021: 86 ML/MIN/1.73M2 (ref 60–?)
EOSINOPHIL # BLD AUTO: 0.01 X10(3) UL (ref 0–0.7)
EOSINOPHIL NFR BLD AUTO: 0.4 %
ERYTHROCYTE [DISTWIDTH] IN BLOOD BY AUTOMATED COUNT: 15.4 % (ref 11–15)
GLOBULIN PLAS-MCNC: 2.4 G/DL (ref 2–3.5)
GLUCOSE BLD-MCNC: 89 MG/DL (ref 70–99)
HCT VFR BLD AUTO: 34.4 % (ref 35–48)
HGB BLD-MCNC: 11.4 G/DL (ref 12–16)
IMM GRANULOCYTES # BLD AUTO: 0.01 X10(3) UL (ref 0–1)
IMM GRANULOCYTES NFR BLD: 0.4 %
LYMPHOCYTES # BLD AUTO: 0.18 X10(3) UL (ref 1–4)
LYMPHOCYTES NFR BLD AUTO: 7.1 %
MCH RBC QN AUTO: 28.9 PG (ref 26–34)
MCHC RBC AUTO-ENTMCNC: 33.1 G/DL (ref 31–37)
MCV RBC AUTO: 87.1 FL (ref 80–100)
MONOCYTES # BLD AUTO: 0.3 X10(3) UL (ref 0.1–1)
MONOCYTES NFR BLD AUTO: 11.9 %
NEUTROPHILS # BLD AUTO: 2.02 X10 (3) UL (ref 1.5–7.7)
NEUTROPHILS # BLD AUTO: 2.02 X10(3) UL (ref 1.5–7.7)
NEUTROPHILS NFR BLD AUTO: 79.8 %
OSMOLALITY SERPL CALC.SUM OF ELEC: 292 MOSM/KG (ref 275–295)
PLATELET # BLD AUTO: 172 10(3)UL (ref 150–450)
POTASSIUM SERPL-SCNC: 4.1 MMOL/L (ref 3.5–5.1)
PROT SERPL-MCNC: 6.6 G/DL (ref 5.7–8.2)
RBC # BLD AUTO: 3.95 X10(6)UL (ref 3.8–5.3)
SODIUM SERPL-SCNC: 142 MMOL/L (ref 136–145)
WBC # BLD AUTO: 2.5 X10(3) UL (ref 4–11)

## 2025-08-29 PROCEDURE — 77336 RADIATION PHYSICS CONSULT: CPT | Performed by: RADIOLOGY

## 2025-08-29 PROCEDURE — 77386 HC IMRT COMPLEX: CPT | Performed by: RADIOLOGY

## 2025-08-29 RX ORDER — FAMOTIDINE 10 MG/ML
20 INJECTION, SOLUTION INTRAVENOUS ONCE
Status: COMPLETED | OUTPATIENT
Start: 2025-08-29 | End: 2025-08-29

## 2025-08-29 RX ORDER — DIPHENHYDRAMINE HYDROCHLORIDE 50 MG/ML
INJECTION, SOLUTION INTRAMUSCULAR; INTRAVENOUS
Status: COMPLETED
Start: 2025-08-29 | End: 2025-08-29

## 2025-08-29 RX ORDER — FAMOTIDINE 10 MG/ML
INJECTION, SOLUTION INTRAVENOUS
Status: COMPLETED
Start: 2025-08-29 | End: 2025-08-29

## 2025-08-29 RX ORDER — DIPHENHYDRAMINE HYDROCHLORIDE 50 MG/ML
25 INJECTION, SOLUTION INTRAMUSCULAR; INTRAVENOUS ONCE
Status: CANCELLED | OUTPATIENT
Start: 2025-08-29

## 2025-08-29 RX ORDER — DIPHENHYDRAMINE HYDROCHLORIDE 50 MG/ML
25 INJECTION, SOLUTION INTRAMUSCULAR; INTRAVENOUS ONCE
Status: COMPLETED | OUTPATIENT
Start: 2025-08-29 | End: 2025-08-29

## 2025-08-29 RX ORDER — FAMOTIDINE 10 MG/ML
20 INJECTION, SOLUTION INTRAVENOUS ONCE
Status: CANCELLED | OUTPATIENT
Start: 2025-08-29

## 2025-08-29 RX ADMIN — FAMOTIDINE 20 MG: 10 INJECTION, SOLUTION INTRAVENOUS at 11:42:00

## 2025-08-29 RX ADMIN — DIPHENHYDRAMINE HYDROCHLORIDE 25 MG: 50 INJECTION, SOLUTION INTRAMUSCULAR; INTRAVENOUS at 11:42:00

## (undated) DEVICE — NEEDLE ASPIR 21GA X 20-40MM 51 DEG ANG TIP

## (undated) DEVICE — SINGLE USE SUCTION VALVE MAJ-209: Brand: SINGLE USE SUCTION VALVE (STERILE)

## (undated) DEVICE — CAPTURA BRONCH BIOPSY FORCEPS NO SPIKE: Brand: CAPTURA

## (undated) DEVICE — SWIVEL CONNECTOR

## (undated) DEVICE — ADAPTER BRONCHSCP 15MM FBROPT SWVL 2 AXIS

## (undated) DEVICE — CONMED SCOPE SAVER BITE BLOCK, 20X27 MM: Brand: SCOPE SAVER

## (undated) DEVICE — Device: Brand: ION

## (undated) DEVICE — MEDI-VAC NON-CONDUCTIVE SUCTION TUBING: Brand: CARDINAL HEALTH

## (undated) DEVICE — VISION PROBE ADAPTER AND SUCTION ADAPTER

## (undated) DEVICE — YANKAUER,BULB TIP,W/O VENT,RIGID,STERILE: Brand: MEDLINE

## (undated) DEVICE — SYRINGE MED 10ML LL TIP W/O SFTY DISP

## (undated) DEVICE — Device: Brand: BALLOON

## (undated) DEVICE — V2 SPECIMEN COLLECTION MANIFOLD KIT: Brand: NEPTUNE

## (undated) DEVICE — SINGLE USE BIOPSY VALVE MAJ-210: Brand: SINGLE USE BIOPSY VALVE (STERILE)

## (undated) DEVICE — 60 ML SYRINGE REGULAR TIP: Brand: MONOJECT

## (undated) DEVICE — BIOPSY NEEDLE, 21G: Brand: FLEXISION

## (undated) DEVICE — KIT CLEAN ENDOKIT 1.1OZ GOWNX2

## (undated) NOTE — LETTER
[facility]  Administration of Anesthesia  ICaron agree to be cared for by a physician anesthesiologist alone and/or with a nurse anesthetist, who is specially trained to monitor me and give me medicine to put me to sleep or keep me comfortable during my procedure    I understand that my anesthesiologist and/or anesthetist is not an employee or agent of [facility] or [employer]. He or she works for [facility]    As the patient asking for anesthesia services, I agree to:  Allow the anesthesiologist (anesthesia doctor) to give me medicine and do additional procedures as necessary. Some examples are: Starting or using an “IV” to give me medicine, fluids or blood during my procedure, and having a breathing tube placed to help me breathe when I’m asleep (intubation). In the event that my heart stops working properly, I understand that my anesthesiologist will make every effort to sustain my life, unless otherwise directed by [facility] Do Not Resuscitate documents.  Tell my anesthesia doctor before my procedure:  If I am pregnant.  The last time that I ate or drank.  iii. All of the medicines I take (including prescriptions, herbal supplements, and pills I can buy without a prescription (including street drugs/illegal medications). Failure to inform my anesthesiologist about these medicines may increase my risk of anesthetic complications.  iv.If I am allergic to anything or have had a reaction to anesthesia before.  I understand how the anesthesia medicine will help me (benefits).  I understand that with any type of anesthesia medicine there are risks:  The most common risks are: nausea, vomiting, sore throat, muscle soreness, damage to my eyes, mouth, or teeth (from breathing tube placement).  Rare risks include: remembering what happened during my procedure, allergic reactions to medications, injury to my airway, heart, lungs, vision, nerves, or  muscles and in extremely rare instances death.  My doctor has explained to me other choices available to me for my care (alternatives).  Pregnant Patients (“epidural”):  I understand that the risks of having an epidural (medicine given into my back to help control pain during labor), include itching, low blood pressure, difficulty urinating, headache or slowing of the baby’s heart. Very rare risks include infection, bleeding, seizure, irregular heart rhythms and nerve injury.  Regional Anesthesia (“spinal”, “epidural”, & “nerve blocks”):  I understand that rare but potential complications include headache, bleeding, infection, seizure, irregular heart rhythms, and nerve injury.    _____________________________________________________________________________  Patient (or Representative) Signature/Relationship to Patient  Date   Time    _____________________________________________________________________________   Name (if used)    Language/Organization   Time    _____________________________________________________________________________  Nurse Anesthetist Signature     Date   Time  _____________________________________________________________________________  Anesthesiologist Signature     Date   Time  I have discussed the procedure and information above with the patient (or patient’s representative) and answered their questions. The patient or their representative has agreed to have anesthesia services.    _____________________________________________________________________________  Witness        Date   Time  I have verified that the signature is that of the patient or patient’s representative, and that it was signed before the procedure  Patient Name: Caron Daniels     : 1960                 Printed: 2025 at 8:10 AM    Medical Record #: [id number]                                            Page 1 of 1  ----------ANESTHESIA CONSENT----------

## (undated) NOTE — IP AVS SNAPSHOT
[address]  [address] ~ [phone]                Discharge Summary   3/13/2017    Mustapha Stanley           Admission Information        Provider Department    3/13/2017 Gee Fortune MD [facility] 5sw/Se Last time this was given:  60 mg on 3/13/2017  1:47 PM   Commonly known as:  Frankie oChen   Next dose due:  3/17/17 - 9:00am!        Take 2 tablets (40 mg) daily 4 days, Then 1 tablet (20 mg) daily 4 days    Ferol Lemon D                             CONTINUE Take antibiotics as prescribed. Take steroids as prescribed beginning tomorrow (3/14). Use inhaler as previously prescribed. Follow-up with your primary physician.   Return to the emergency department if worsening difficulty breathing or other new sympto (03/14/17)  6.1 (03/14/17)  4.66 (03/14/17)  12.9 (03/14/17)  40.2 (03/14/17)  86.2 -- -- -- (03/14/17)  191 (03/14/17)  10.3    (03/13/17)  6.9 (03/13/17)  4.57 (03/13/17)  12.8 (03/13/17)  38.7 (03/13/17)  84.8    (03/13/17)  183 (03/13/17)  10.2      R coverage. Patient [address] is a Federal Navigator program that can help with your Affordable Care Act coverage, as well as all types of Medicaid plans.   To get signed up and covered, please call [phone] and ask to get set up for an insuran Most common side effects:  Allergic reactions, rash, nausea, diarrhea   What to report to your healthcare team: Tolerance of medications, temperature, rash, itching, shortness of breath, chills, nausea, and diarrhea           Blood Pressure and Cardiac Medi Use: To treat inflammation, to prevent or treat allergic reactions, chemotherapy related nausea, used as part of some chemo protocols   Most common side effects:  Increased blood sugar, high blood pressure, fluid retention, mood changes, stomach upset

## (undated) NOTE — MR AVS SNAPSHOT
[address] [address] [address]  [phone]               Thank you for choosing us for your health care visit with KIESHA Brody.   We are glad to serve you and happy to provide you with this summary of your vis Fully enjoy your food when eating. Don’t eat while distracted and slow down. Avoid over sized portions. Don’t eat while when you’re bored.      EAT THESE FOODS MORE OFTEN: EAT THESE FOODS LESS OFTEN:   Make half your plate fruits and vegetables Highly

## (undated) NOTE — LETTER
[facility] [address], [place], [place]    Authorization for Surgical Operation and Procedure                               I hereby authorize Edil Odonnell DO, my physician and his/her assistants (if applicable), which may include medical students, residents, and/or fellows, to perform the following surgical operation/ procedure and administer such anesthesia as may be determined necessary by my physician: Operation/Procedure name (s) ROBOT-ASSISTED NAVIGATIONAL BRONCHOSCOPY / ENDOBRONCHIAL ULTRASOUND ( on Caron Hayneselis   2.   I recognize that during the surgical operation/procedure, unforeseen conditions may necessitate additional or different procedures than those listed above.  I, therefore, further authorize and request that the above-named surgeon, assistants, or designees perform such procedures as are, in their judgment, necessary and desirable.    3.   My surgeon/physician has discussed prior to my surgery the potential benefits, risks and side effects of this procedure; the likelihood of achieving goals; and potential problems that might occur during recuperation.  They also discussed reasonable alternatives to the procedure, including risks, benefits, and side effects related to the alternatives and risks related to not receiving this procedure.  I have had all my questions answered and I acknowledge that no guarantee has been made as to the result that may be obtained.    4.   Should the need arise during my operation/procedure, which includes change of level of care prior to discharge, I also consent to the administration of blood and/or blood products.  Further, I understand that despite careful testing and screening of blood or blood products by collecting agencies, I may still be subject to ill effects as a result of receiving a blood transfusion and/or blood products.  The following are some, but not all, of the potential risks that can occur: fever and allergic  reactions, hemolytic reactions, transmission of diseases such as Hepatitis, AIDS and Cytomegalovirus (CMV) and fluid overload.  In the event that I wish to have an autologous transfusion of my own blood, or a directed donor transfusion, I will discuss this with my physician.  Check only if Refusing Blood or Blood Products  I understand refusal of blood or blood products as deemed necessary by my physician may have serious consequences to my condition to include possible death. I hereby assume responsibility for my refusal and release the hospital, its personnel, and my physicians from any responsibility for the consequences of my refusal.    o  Refuse   5.   I authorize the use of any specimen, organs, tissues, body parts or foreign objects that may be removed from my body during the operation/procedure for diagnosis, research or teaching purposes and their subsequent disposal by hospital authorities.  I also authorize the release of specimen test results and/or written reports to my treating physician on the hospital medical staff or other referring or consulting physicians involved in my care, at the discretion of the Pathologist or my treating physician.    6.   I consent to the photographing or videotaping of the operations or procedures to be performed, including appropriate portions of my body for medical, scientific, or educational purposes, provided my identity is not revealed by the pictures or by descriptive texts accompanying them.  If the procedure has been photographed/videotaped, the surgeon will obtain the original picture, image, videotape or CD.  The hospital will not be responsible for storage, release or maintenance of the picture, image, tape or CD.    7.   I consent to the presence of a  or observers in the operating room as deemed necessary by my physician or their designees.    8.   I recognize that in the event my procedure results in extended X-Ray/fluoroscopy time, I may  develop a skin reaction.    9. If I have a Do Not Attempt Resuscitation (DNAR) order in place, that status will be suspended while in the operating room, procedural suite, and during the recovery period unless otherwise explicitly stated by me (or a person authorized to consent on my behalf). The surgeon or my attending physician will determine when the applicable recovery period ends for purposes of reinstating the DNAR order.  10. Patients having a sterilization procedure: I understand that if the procedure is successful the results will be permanent and it will therefore be impossible for me to inseminate, conceive, or bear children.  I also understand that the procedure is intended to result in sterility, although the result has not been guaranteed.   11. I acknowledge that my physician has explained sedation/analgesia administration to me including the risk and benefits I consent to the administration of sedation/analgesia as may be necessary or desirable in the judgment of my physician.    I CERTIFY THAT I HAVE READ AND FULLY UNDERSTAND THE ABOVE CONSENT TO OPERATION and/or OTHER PROCEDURE.     ____________________________________  _________________________________        ______________________________  Signature of Patient    Signature of Responsible Person                Printed Name of Responsible Person                                      ____________________________________  _____________________________                ________________________________  Signature of Witness        Date  Time         Relationship to Patient    STATEMENT OF PHYSICIAN My signature below affirms that prior to the time of the procedure; I have explained to the patient and/or his/her legal representative, the risks and benefits involved in the proposed treatment and any reasonable alternative to the proposed treatment. I have also explained the risks and benefits involved in refusal of the proposed treatment and alternatives to  the proposed treatment and have answered the patient's questions. If I have a significant financial interest in a co-management agreement or a significant financial interest in any product or implant, or other significant relationship used in this procedure/surgery, I have disclosed this and had a discussion with my patient.     _____________________________________________________              _____________________________  (Signature of Physician)                                                                                         (Date)                                   (Time)  Patient Name: Caron Haynesharshraissa      : 1960      Printed: 2025     Medical Record #: [id number]                                      Page 1 of 1